# Patient Record
Sex: FEMALE | Race: WHITE | NOT HISPANIC OR LATINO | Employment: OTHER | RURAL
[De-identification: names, ages, dates, MRNs, and addresses within clinical notes are randomized per-mention and may not be internally consistent; named-entity substitution may affect disease eponyms.]

---

## 2020-07-20 ENCOUNTER — HISTORICAL (OUTPATIENT)
Dept: ADMINISTRATIVE | Facility: HOSPITAL | Age: 75
End: 2020-07-20

## 2021-08-17 ENCOUNTER — HOSPITAL ENCOUNTER (OUTPATIENT)
Dept: RADIOLOGY | Facility: HOSPITAL | Age: 76
Discharge: HOME | End: 2021-08-17
Payer: MEDICARE

## 2021-08-17 ENCOUNTER — HOSPITAL ENCOUNTER (OUTPATIENT)
Dept: RADIOLOGY | Facility: HOSPITAL | Age: 76
Discharge: HOME | End: 2021-08-17
Attending: RADIOLOGY
Payer: MEDICARE

## 2021-08-17 VITALS — WEIGHT: 164 LBS | BODY MASS INDEX: 25.74 KG/M2 | HEIGHT: 67 IN

## 2021-08-17 DIAGNOSIS — Z12.31 VISIT FOR SCREENING MAMMOGRAM: ICD-10-CM

## 2021-08-17 DIAGNOSIS — R92.8 ABNORMAL MAMMOGRAM: ICD-10-CM

## 2021-08-17 PROCEDURE — 77067 SCR MAMMO BI INCL CAD: CPT | Mod: 26,,, | Performed by: RADIOLOGY

## 2021-08-17 PROCEDURE — 77067 MAMMO DIGITAL SCREENING BILAT: ICD-10-PCS | Mod: 26,,, | Performed by: RADIOLOGY

## 2021-08-17 PROCEDURE — 77067 SCR MAMMO BI INCL CAD: CPT | Mod: TC

## 2022-09-27 ENCOUNTER — HOSPITAL ENCOUNTER (OUTPATIENT)
Dept: RADIOLOGY | Facility: HOSPITAL | Age: 77
Discharge: HOME | End: 2022-09-27
Payer: MEDICARE

## 2022-09-27 DIAGNOSIS — Z12.31 VISIT FOR SCREENING MAMMOGRAM: ICD-10-CM

## 2022-09-27 PROCEDURE — 77067 SCR MAMMO BI INCL CAD: CPT | Mod: 26,,, | Performed by: RADIOLOGY

## 2022-09-27 PROCEDURE — 77067 MAMMO DIGITAL SCREENING BILAT: ICD-10-PCS | Mod: 26,,, | Performed by: RADIOLOGY

## 2022-09-27 PROCEDURE — 77067 SCR MAMMO BI INCL CAD: CPT | Mod: TC

## 2022-10-18 ENCOUNTER — CLINICAL SUPPORT (OUTPATIENT)
Dept: FAMILY MEDICINE | Facility: CLINIC | Age: 77
End: 2022-10-18
Payer: MEDICARE

## 2022-10-18 DIAGNOSIS — Z23 NEED FOR IMMUNIZATION AGAINST INFLUENZA: Primary | ICD-10-CM

## 2022-10-18 PROCEDURE — G0008 ADMIN INFLUENZA VIRUS VAC: HCPCS | Mod: ,,, | Performed by: FAMILY MEDICINE

## 2022-10-18 PROCEDURE — 90694 VACC AIIV4 NO PRSRV 0.5ML IM: CPT | Mod: ,,, | Performed by: FAMILY MEDICINE

## 2022-10-18 PROCEDURE — G0008 FLU VACCINE - QUADRIVALENT - ADJUVANTED: ICD-10-PCS | Mod: ,,, | Performed by: FAMILY MEDICINE

## 2022-10-18 PROCEDURE — 90694 FLU VACCINE - QUADRIVALENT - ADJUVANTED: ICD-10-PCS | Mod: ,,, | Performed by: FAMILY MEDICINE

## 2022-11-09 DIAGNOSIS — Z71.89 COMPLEX CARE COORDINATION: ICD-10-CM

## 2022-11-16 ENCOUNTER — IMMUNIZATION (OUTPATIENT)
Dept: FAMILY MEDICINE | Facility: CLINIC | Age: 77
End: 2022-11-16
Payer: MEDICARE

## 2022-11-16 DIAGNOSIS — Z23 NEED FOR VACCINATION: Primary | ICD-10-CM

## 2022-11-16 PROCEDURE — 0134A COVID-19, MRNA, LNP-S, BIVALENT BOOSTER, PF, 50 MCG/0.5 ML: CPT | Mod: ,,, | Performed by: NURSE PRACTITIONER

## 2022-11-16 PROCEDURE — 91313 COVID-19, MRNA, LNP-S, BIVALENT BOOSTER, PF, 50 MCG/0.5 ML: ICD-10-PCS | Mod: ,,, | Performed by: NURSE PRACTITIONER

## 2022-11-16 PROCEDURE — 0134A COVID-19, MRNA, LNP-S, BIVALENT BOOSTER, PF, 50 MCG/0.5 ML: ICD-10-PCS | Mod: ,,, | Performed by: NURSE PRACTITIONER

## 2022-11-16 PROCEDURE — 91313 COVID-19, MRNA, LNP-S, BIVALENT BOOSTER, PF, 50 MCG/0.5 ML: CPT | Mod: ,,, | Performed by: NURSE PRACTITIONER

## 2022-11-29 ENCOUNTER — HOSPITAL ENCOUNTER (OUTPATIENT)
Dept: RADIOLOGY | Facility: HOSPITAL | Age: 77
Discharge: HOME | End: 2022-11-29
Attending: ORTHOPAEDIC SURGERY
Payer: MEDICARE

## 2022-11-29 DIAGNOSIS — M25.569 KNEE PAIN, UNSPECIFIED CHRONICITY, UNSPECIFIED LATERALITY: ICD-10-CM

## 2022-11-29 PROCEDURE — 73564 X-RAY EXAM KNEE 4 OR MORE: CPT | Mod: TC,RT

## 2022-12-06 PROBLEM — M17.11 PRIMARY OSTEOARTHRITIS OF RIGHT KNEE: Status: ACTIVE | Noted: 2022-12-06

## 2023-11-28 ENCOUNTER — HOSPITAL ENCOUNTER (OUTPATIENT)
Dept: RADIOLOGY | Facility: HOSPITAL | Age: 78
Discharge: HOME | End: 2023-11-28
Payer: MEDICARE

## 2023-11-28 DIAGNOSIS — Z12.31 VISIT FOR SCREENING MAMMOGRAM: ICD-10-CM

## 2023-11-28 PROCEDURE — 77067 SCR MAMMO BI INCL CAD: CPT | Mod: TC

## 2023-11-28 PROCEDURE — 77067 MAMMO DIGITAL SCREENING BILAT: ICD-10-PCS | Mod: 26,,, | Performed by: RADIOLOGY

## 2023-11-28 PROCEDURE — 77067 SCR MAMMO BI INCL CAD: CPT | Mod: 26,,, | Performed by: RADIOLOGY

## 2023-11-30 ENCOUNTER — OFFICE VISIT (OUTPATIENT)
Dept: FAMILY MEDICINE | Facility: CLINIC | Age: 78
End: 2023-11-30
Payer: MEDICARE

## 2023-11-30 VITALS
OXYGEN SATURATION: 97 % | TEMPERATURE: 99 F | BODY MASS INDEX: 26.84 KG/M2 | WEIGHT: 171 LBS | RESPIRATION RATE: 18 BRPM | SYSTOLIC BLOOD PRESSURE: 136 MMHG | HEIGHT: 67 IN | DIASTOLIC BLOOD PRESSURE: 76 MMHG | HEART RATE: 110 BPM

## 2023-11-30 DIAGNOSIS — J06.9 UPPER RESPIRATORY TRACT INFECTION, UNSPECIFIED TYPE: Primary | ICD-10-CM

## 2023-11-30 DIAGNOSIS — Z20.828 EXPOSURE TO VIRAL DISEASE: ICD-10-CM

## 2023-11-30 LAB
CTP QC/QA: YES
FLUAV AG NPH QL: NEGATIVE
FLUBV AG NPH QL: NEGATIVE

## 2023-11-30 PROCEDURE — 99213 OFFICE O/P EST LOW 20 MIN: CPT | Mod: ,,, | Performed by: NURSE PRACTITIONER

## 2023-11-30 PROCEDURE — 99213 PR OFFICE/OUTPT VISIT, EST, LEVL III, 20-29 MIN: ICD-10-PCS | Mod: ,,, | Performed by: NURSE PRACTITIONER

## 2023-11-30 PROCEDURE — 96372 PR INJECTION,THERAP/PROPH/DIAG2ST, IM OR SUBCUT: ICD-10-PCS | Mod: ,,, | Performed by: NURSE PRACTITIONER

## 2023-11-30 PROCEDURE — 96372 THER/PROPH/DIAG INJ SC/IM: CPT | Mod: ,,, | Performed by: NURSE PRACTITIONER

## 2023-11-30 PROCEDURE — 87804 INFLUENZA ASSAY W/OPTIC: CPT | Mod: 59,RHCUB | Performed by: NURSE PRACTITIONER

## 2023-11-30 RX ORDER — DEXAMETHASONE SODIUM PHOSPHATE 4 MG/ML
4 INJECTION, SOLUTION INTRA-ARTICULAR; INTRALESIONAL; INTRAMUSCULAR; INTRAVENOUS; SOFT TISSUE
Status: COMPLETED | OUTPATIENT
Start: 2023-11-30 | End: 2023-11-30

## 2023-11-30 RX ORDER — PRAVASTATIN SODIUM 40 MG/1
40 TABLET ORAL NIGHTLY
COMMUNITY
Start: 2023-11-06

## 2023-11-30 RX ORDER — CETIRIZINE HYDROCHLORIDE, PSEUDOEPHEDRINE HYDROCHLORIDE 5; 120 MG/1; MG/1
1 TABLET, FILM COATED, EXTENDED RELEASE ORAL 2 TIMES DAILY
Qty: 20 TABLET | Refills: 0 | Status: SHIPPED | OUTPATIENT
Start: 2023-11-30 | End: 2023-12-10

## 2023-11-30 RX ORDER — CARVEDILOL 25 MG/1
25 TABLET ORAL 2 TIMES DAILY
COMMUNITY
Start: 2023-11-10

## 2023-11-30 RX ORDER — IPRATROPIUM BROMIDE 21 UG/1
2 SPRAY, METERED NASAL 2 TIMES DAILY
Qty: 30 ML | Refills: 0 | Status: SHIPPED | OUTPATIENT
Start: 2023-11-30

## 2023-11-30 RX ADMIN — DEXAMETHASONE SODIUM PHOSPHATE 4 MG: 4 INJECTION, SOLUTION INTRA-ARTICULAR; INTRALESIONAL; INTRAMUSCULAR; INTRAVENOUS; SOFT TISSUE at 03:11

## 2023-11-30 NOTE — PROGRESS NOTES
[facility]    Chief Complaint      Chief Complaint   Patient presents with    Sore Throat     All symptoms onset of last Friday     Nasal Congestion    Fever     Low grade       History of Present Illness      Flaquita Sosa is a 78 y.o. female. She  has a past medical history of Hyperlipidemia and Hypertension., who presents today for URI type symptoms- ST, congestion, low-grade temp x 6 days.    Past Medical History:  Past Medical History:   Diagnosis Date    Hyperlipidemia     Hypertension        Past Surgical History:   has a past surgical history that includes Oophorectomy and Hysterectomy.    Social History:  Social History     Tobacco Use    Smoking status: Never     Passive exposure: Never    Smokeless tobacco: Never   Substance Use Topics    Alcohol use: Never    Drug use: Never       I personally reviewed all past medical, surgical, and social.     Review of Systems   Constitutional:  Positive for fever. Negative for chills and fatigue.   HENT:  Positive for congestion and sore throat.    Respiratory:  Negative for cough and shortness of breath.    Gastrointestinal:  Negative for diarrhea, nausea and vomiting.   Musculoskeletal:  Negative for myalgias.   Neurological:  Negative for headaches.        Medications:  Outpatient Encounter Medications as of 11/30/2023   Medication Sig Dispense Refill    carvediloL (COREG) 25 MG tablet Take 25 mg by mouth 2 (two) times daily.      ELIQUIS 2.5 mg Tab Take 2.5 mg by mouth 2 (two) times daily.      flecainide (TAMBOCOR) 50 MG Tab       furosemide (LASIX) 20 MG tablet       olmesartan-hydrochlorothiazide (BENICAR HCT) 40-12.5 mg Tab       pravastatin (PRAVACHOL) 40 MG tablet Take 40 mg by mouth every evening.      spironolactone (ALDACTONE) 25 MG tablet Take 25 mg by mouth.      cetirizine-pseudoephedrine 5-120 mg Tb12 Take 1 tablet by mouth 2 (two) times a day. for 10 days 20 tablet 0    ipratropium (ATROVENT) 21 mcg (0.03 %) nasal spray 2 sprays by Each  "Nostril route 2 (two) times daily. 30 mL 0    pravastatin (PRAVACHOL) 20 MG tablet       [] dexAMETHasone injection 4 mg        No facility-administered encounter medications on file as of 2023.       Allergies:  Review of patient's allergies indicates:  No Known Allergies    Health Maintenance:  Immunization History   Administered Date(s) Administered    COVID-19 MRNA, LN-S PF (MODERNA HALF 0.25 ML DOSE) 09/10/2021, 2022    COVID-19, MRNA, LN-S, PF (MODERNA FULL 0.5 ML DOSE) 2021, 03/15/2021    COVID-19, mRNA, LNP-S, bivalent booster, PF (Moderna Omicron)12 + YEARS 2022    DTaP (5 Pertussis Antigens) 2017    Influenza (FLUAD) - Quadrivalent - Adjuvanted - PF *Preferred* (65+) 10/18/2022    Influenza - High Dose - PF (65 years and older) 10/27/2015, 10/28/2016, 10/27/2017, 10/26/2018, 2020, 2021    Influenza - Quadrivalent - PF *Preferred* (6 months and older) 10/17/2019    Influenza - Trivalent (ADULT) 10/14/2014    Pneumococcal Conjugate - 13 Valent 2019    Pneumococcal Polysaccharide - 23 Valent 2015    Zoster 10/12/2020, 2020      Health Maintenance   Topic Date Due    Hepatitis C Screening  Never done    Lipid Panel  Never done    TETANUS VACCINE  Never done    DEXA Scan  Never done    Shingles Vaccine (2 of 3) 2021        Physical Exam      Vital Signs  Temp: 98.5 °F (36.9 °C)  Temp Source: Oral  Pulse: 110  Resp: 18  SpO2: 97 %  BP: 136/76  BP Location: Right arm  Patient Position: Sitting  Pain Score:   3  Height and Weight  Height: 5' 7" (170.2 cm)  Weight: 77.6 kg (171 lb)  BSA (Calculated - sq m): 1.91 sq meters  BMI (Calculated): 26.8  Weight in (lb) to have BMI = 25: 159.3]    Physical Exam  Vitals and nursing note reviewed.   Constitutional:       Appearance: Normal appearance. She is well-developed.   HENT:      Head: Normocephalic.      Right Ear: Hearing, tympanic membrane, ear canal and external ear normal.      Left Ear: " "Hearing, tympanic membrane, ear canal and external ear normal.      Nose: Congestion present.      Mouth/Throat:      Lips: Pink.      Pharynx: Oropharynx is clear.   Eyes:      General: Lids are normal.      Conjunctiva/sclera: Conjunctivae normal.   Cardiovascular:      Rate and Rhythm: Normal rate and regular rhythm.      Heart sounds: Normal heart sounds.   Pulmonary:      Effort: Pulmonary effort is normal.      Breath sounds: Normal breath sounds.   Musculoskeletal:         General: Normal range of motion.      Cervical back: Normal range of motion and neck supple.   Skin:     General: Skin is warm and dry.   Neurological:      Mental Status: She is alert and oriented to person, place, and time.      Gait: Gait is intact.   Psychiatric:         Behavior: Behavior is cooperative.          Laboratory:  CBC:      CMP:        Invalid input(s): "CREATININ"  LIPIDS:      TSH:      A1C:        Assessment/Plan     Flaquita Sosa is a 78 y.o.female with:     1. Upper respiratory tract infection, unspecified type  -     dexAMETHasone injection 4 mg  -     ipratropium (ATROVENT) 21 mcg (0.03 %) nasal spray; 2 sprays by Each Nostril route 2 (two) times daily.  Dispense: 30 mL; Refill: 0  -     cetirizine-pseudoephedrine 5-120 mg Tb12; Take 1 tablet by mouth 2 (two) times a day. for 10 days  Dispense: 20 tablet; Refill: 0    2. Exposure to viral disease  -     POCT Influenza A/B     Rapid flu- negative    Total time spent face-to-face and non-face-to-face coordinating care for this encounter was: 20 minutes     Chronic conditions status updated as per HPI.  Other than changes above, cont current medications and maintain follow up with specialists.  Return to clinic prn if symptoms worsen or fail to improve.    Laura Carnes, MARYP  [facility]  "

## 2023-12-23 ENCOUNTER — OFFICE VISIT (OUTPATIENT)
Dept: FAMILY MEDICINE | Facility: CLINIC | Age: 78
End: 2023-12-23
Payer: MEDICARE

## 2023-12-23 VITALS
HEART RATE: 80 BPM | TEMPERATURE: 98 F | SYSTOLIC BLOOD PRESSURE: 115 MMHG | HEIGHT: 67 IN | BODY MASS INDEX: 26.68 KG/M2 | WEIGHT: 170 LBS | DIASTOLIC BLOOD PRESSURE: 60 MMHG | RESPIRATION RATE: 19 BRPM | OXYGEN SATURATION: 95 %

## 2023-12-23 DIAGNOSIS — U07.1 COVID-19: Primary | ICD-10-CM

## 2023-12-23 DIAGNOSIS — Z20.828 EXPOSURE TO VIRAL DISEASE: ICD-10-CM

## 2023-12-23 LAB
CTP QC/QA: YES
CTP QC/QA: YES
FLUAV AG NPH QL: NEGATIVE
FLUBV AG NPH QL: NEGATIVE
SARS-COV-2 AG RESP QL IA.RAPID: POSITIVE

## 2023-12-23 PROCEDURE — 99214 PR OFFICE/OUTPT VISIT, EST, LEVL IV, 30-39 MIN: ICD-10-PCS | Mod: ,,, | Performed by: FAMILY MEDICINE

## 2023-12-23 PROCEDURE — 99214 OFFICE O/P EST MOD 30 MIN: CPT | Mod: ,,, | Performed by: FAMILY MEDICINE

## 2023-12-23 PROCEDURE — 87804 INFLUENZA ASSAY W/OPTIC: CPT | Mod: 59,RHCUB | Performed by: FAMILY MEDICINE

## 2023-12-23 PROCEDURE — 96372 THER/PROPH/DIAG INJ SC/IM: CPT | Mod: ,,, | Performed by: FAMILY MEDICINE

## 2023-12-23 PROCEDURE — 96372 PR INJECTION,THERAP/PROPH/DIAG2ST, IM OR SUBCUT: ICD-10-PCS | Mod: ,,, | Performed by: FAMILY MEDICINE

## 2023-12-23 PROCEDURE — 87426 SARSCOV CORONAVIRUS AG IA: CPT | Mod: RHCUB | Performed by: FAMILY MEDICINE

## 2023-12-23 RX ORDER — DEXAMETHASONE SODIUM PHOSPHATE 4 MG/ML
4 INJECTION, SOLUTION INTRA-ARTICULAR; INTRALESIONAL; INTRAMUSCULAR; INTRAVENOUS; SOFT TISSUE
Status: COMPLETED | OUTPATIENT
Start: 2023-12-23 | End: 2023-12-23

## 2023-12-23 RX ORDER — PREDNISONE 20 MG/1
20 TABLET ORAL DAILY
Qty: 5 TABLET | Refills: 0 | Status: SHIPPED | OUTPATIENT
Start: 2023-12-23 | End: 2023-12-28

## 2023-12-23 RX ORDER — AZITHROMYCIN 250 MG/1
TABLET, FILM COATED ORAL
Qty: 6 TABLET | Refills: 0 | Status: SHIPPED | OUTPATIENT
Start: 2023-12-23

## 2023-12-23 RX ADMIN — DEXAMETHASONE SODIUM PHOSPHATE 4 MG: 4 INJECTION, SOLUTION INTRA-ARTICULAR; INTRALESIONAL; INTRAMUSCULAR; INTRAVENOUS; SOFT TISSUE at 12:12

## 2023-12-23 NOTE — LETTER
December 23, 2023      [facility]  [address] [facility] [place] [address]  Phone: [phone]  Fax: [phone]       Patient: Flaquita Sosa   YOB: 1945  Date of Visit: 12/23/2023    To Whom It May Concern:    Bogdan Sosa  was at [facility] on 12/23/2023. The patient may return to work/school on 12/28/2023 with no restrictions. If you have any questions or concerns, or if I can be of further assistance, please do not hesitate to contact me.    Sincerely,    Ramon Zimmerman, CMA

## 2023-12-23 NOTE — PROGRESS NOTES
Subjective:       Patient ID: Flaquita Sosa is a 78 y.o. female.    Chief Complaint: Nasal Congestion    HPI  Review of Systems   Constitutional:  Negative for activity change, appetite change, chills, diaphoresis, fatigue, fever and unexpected weight change.   HENT:  Positive for nasal congestion, postnasal drip, sinus pressure/congestion and sore throat. Negative for dental problem, drooling, ear discharge, ear pain, facial swelling, hearing loss, mouth sores, nosebleeds, rhinorrhea, sneezing, tinnitus, trouble swallowing, voice change and goiter.    Eyes:  Negative for photophobia, discharge, itching and visual disturbance.   Respiratory:  Negative for apnea, cough, choking, chest tightness, shortness of breath, wheezing and stridor.    Cardiovascular:  Negative for chest pain, palpitations, leg swelling and claudication.   Gastrointestinal:  Negative for abdominal distention, abdominal pain, anal bleeding, blood in stool, change in bowel habit, constipation, diarrhea, nausea and vomiting.   Endocrine: Negative for cold intolerance, heat intolerance, polydipsia, polyphagia and polyuria.   Genitourinary:  Negative for bladder incontinence, decreased urine volume, difficulty urinating, dysuria, enuresis, flank pain, frequency, hematuria, nocturia, pelvic pain and urgency.   Musculoskeletal:  Negative for arthralgias, back pain, gait problem, joint swelling, leg pain, myalgias, neck pain, neck stiffness and joint deformity.   Integumentary:  Negative for pallor, rash, wound, breast mass and breast tenderness.   Allergic/Immunologic: Negative for environmental allergies, food allergies and immunocompromised state.   Neurological:  Negative for dizziness, vertigo, tremors, seizures, syncope, facial asymmetry, speech difficulty, weakness, light-headedness, numbness, headaches, memory loss and coordination difficulties.   Hematological:  Negative for adenopathy. Does not bruise/bleed easily.   Psychiatric/Behavioral:   Negative for agitation, behavioral problems, confusion, decreased concentration, dysphoric mood, hallucinations, self-injury, sleep disturbance and suicidal ideas. The patient is not nervous/anxious and is not hyperactive.    Breast: Negative for mass and tenderness        Objective:      Physical Exam  Vitals reviewed.   Constitutional:       Appearance: Normal appearance.   HENT:      Head: Normocephalic and atraumatic.      Right Ear: Tympanic membrane, ear canal and external ear normal.      Left Ear: Tympanic membrane, ear canal and external ear normal.      Nose: Congestion and rhinorrhea present.      Mouth/Throat:      Mouth: Mucous membranes are moist.      Pharynx: Oropharynx is clear. Posterior oropharyngeal erythema present.   Eyes:      Extraocular Movements: Extraocular movements intact.      Conjunctiva/sclera: Conjunctivae normal.      Pupils: Pupils are equal, round, and reactive to light.   Cardiovascular:      Rate and Rhythm: Normal rate and regular rhythm.      Pulses: Normal pulses.      Heart sounds: Normal heart sounds.   Pulmonary:      Effort: Pulmonary effort is normal.      Breath sounds: Normal breath sounds.   Abdominal:      General: Bowel sounds are normal.      Palpations: Abdomen is soft.   Musculoskeletal:         General: Normal range of motion.      Cervical back: Normal range of motion and neck supple.   Skin:     General: Skin is warm and dry.   Neurological:      General: No focal deficit present.      Mental Status: She is alert. Mental status is at baseline.   Psychiatric:         Mood and Affect: Mood normal.         Behavior: Behavior normal.         Thought Content: Thought content normal.         Judgment: Judgment normal.         Assessment:       1. COVID-19    2. Exposure to viral disease        Plan:     COVID-19  -     dexAMETHasone injection 4 mg  -     predniSONE (DELTASONE) 20 MG tablet; Take 1 tablet (20 mg total) by mouth once daily. for 5 days  Dispense: 5  tablet; Refill: 0  -     azithromycin (Z-AMADOR) 250 MG tablet; Take 2 tablets by mouth on day 1; Take 1 tablet by mouth on days 2-5  Dispense: 6 tablet; Refill: 0    Exposure to viral disease  -     SARS Coronavirus 2 Antigen, POCT  -     POCT Influenza A/B Rapid Antigen

## 2024-06-12 ENCOUNTER — HOSPITAL ENCOUNTER (OUTPATIENT)
Dept: RADIOLOGY | Facility: HOSPITAL | Age: 79
Discharge: HOME | End: 2024-06-12
Attending: NURSE PRACTITIONER
Payer: MEDICARE

## 2024-06-12 ENCOUNTER — HOSPITAL ENCOUNTER (OUTPATIENT)
Dept: RADIOLOGY | Facility: HOSPITAL | Age: 79
Discharge: HOME | End: 2024-06-12
Attending: ORTHOPAEDIC SURGERY
Payer: MEDICARE

## 2024-06-12 ENCOUNTER — OFFICE VISIT (OUTPATIENT)
Dept: ORTHOPEDICS | Facility: CLINIC | Age: 79
End: 2024-06-12
Payer: MEDICARE

## 2024-06-12 VITALS — BODY MASS INDEX: 26.07 KG/M2 | WEIGHT: 172 LBS | HEIGHT: 68 IN

## 2024-06-12 DIAGNOSIS — M79.671 RIGHT FOOT PAIN: ICD-10-CM

## 2024-06-12 DIAGNOSIS — M79.671 RIGHT FOOT PAIN: Primary | ICD-10-CM

## 2024-06-12 PROCEDURE — 99999 PR PBB SHADOW E&M-EST. PATIENT-LVL IV: CPT | Mod: PBBFAC,,, | Performed by: NURSE PRACTITIONER

## 2024-06-12 PROCEDURE — 73610 X-RAY EXAM OF ANKLE: CPT | Mod: TC,RT

## 2024-06-12 PROCEDURE — 73610 X-RAY EXAM OF ANKLE: CPT | Mod: 26,RT,, | Performed by: RADIOLOGY

## 2024-06-12 PROCEDURE — 99214 OFFICE O/P EST MOD 30 MIN: CPT | Mod: S$PBB,,, | Performed by: NURSE PRACTITIONER

## 2024-06-12 PROCEDURE — 73630 X-RAY EXAM OF FOOT: CPT | Mod: 26,RT,, | Performed by: RADIOLOGY

## 2024-06-12 PROCEDURE — 99214 OFFICE O/P EST MOD 30 MIN: CPT | Mod: PBBFAC,25 | Performed by: NURSE PRACTITIONER

## 2024-06-12 PROCEDURE — 73630 X-RAY EXAM OF FOOT: CPT | Mod: TC,RT

## 2024-06-12 RX ORDER — AMLODIPINE BESYLATE 2.5 MG/1
2.5 TABLET ORAL DAILY
COMMUNITY

## 2024-06-12 NOTE — PROGRESS NOTES
HPI:   Flaquita Sosa is a pleasant 78 y.o. patient who reports to clinic for evaluation of right foot and ankle..  Reports she rolled her foot about 2 weeks ago and it has been painful since that time.  She rolled to the outside.  She does have some mild swelling on the medial and lateral side of her ankle but her pain is in her mid foot area.  She reports it has come down some but is still painful at times when she walks.  She has had no previous treatment.  Can not take anti-inflammatories.    Injury onset and description: fall  Patient's occupation:   This is not a work related injury.   This injury has been non-responsive to conservative care. The pain is worse with repetitive use, and strenuous activity is very difficult.  her pain improves with rest.  she rates pain as a  on the Visual Analog Scale.        PAST MEDICAL HISTORY:   Past Medical History:   Diagnosis Date    Hyperlipidemia     Hypertension      PAST SURGICAL HISTORY:   Past Surgical History:   Procedure Laterality Date    HYSTERECTOMY      OOPHORECTOMY       MEDICATIONS:    Current Outpatient Medications:     amLODIPine (NORVASC) 2.5 MG tablet, Take 2.5 mg by mouth once daily., Disp: , Rfl:     carvediloL (COREG) 25 MG tablet, Take 25 mg by mouth 2 (two) times daily., Disp: , Rfl:     ELIQUIS 2.5 mg Tab, Take 2.5 mg by mouth 2 (two) times daily., Disp: , Rfl:     flecainide (TAMBOCOR) 50 MG Tab, , Disp: , Rfl:     furosemide (LASIX) 20 MG tablet, , Disp: , Rfl:     pravastatin (PRAVACHOL) 20 MG tablet, , Disp: , Rfl:     pravastatin (PRAVACHOL) 40 MG tablet, Take 40 mg by mouth every evening., Disp: , Rfl:     spironolactone (ALDACTONE) 25 MG tablet, Take 25 mg by mouth., Disp: , Rfl:     azithromycin (Z-AMADOR) 250 MG tablet, Take 2 tablets by mouth on day 1; Take 1 tablet by mouth on days 2-5 (Patient not taking: Reported on 6/12/2024), Disp: 6 tablet, Rfl: 0    ipratropium (ATROVENT) 21 mcg (0.03 %) nasal spray, 2 sprays by Each  "Nostril route 2 (two) times daily., Disp: 30 mL, Rfl: 0    olmesartan-hydrochlorothiazide (BENICAR HCT) 40-12.5 mg Tab, , Disp: , Rfl:   ALLERGIES:   Review of patient's allergies indicates:   Allergen Reactions    Iodinated contrast media          PHYSICAL EXAM:  VITAL SIGNS: Ht 5' 8" (1.727 m)   Wt 78 kg (172 lb)   BMI 26.15 kg/m²   General: Well-developed well-nourished 78 y.o. femalein no acute distress;Cardiovascular: Regular rhythm by palpation of distal pulse, normal color and temperature, no concerning varicosities on symptomatic side Lungs: No labored breathing or wheezing appreciated Neuro: Alert and oriented ×3 Psychiatric: well oriented to person, place and time, demonstrates normal mood and affect Skin: No rashes, lesions or ulcers, normal temperature, turgor, and texture on uninvolved extremity    General    Constitutional: She is oriented to person, place, and time. She appears well-nourished.   HENT:   Head: Normocephalic and atraumatic.   Eyes: Pupils are equal, round, and reactive to light.   Neck: Neck supple.   Cardiovascular:  Normal rate and regular rhythm.            Pulmonary/Chest: Effort normal. No respiratory distress.   Abdominal: There is no abdominal tenderness. There is no guarding.   Neurological: She is alert and oriented to person, place, and time. She has normal reflexes.   Psychiatric: She has a normal mood and affect. Her behavior is normal. Judgment and thought content normal.         Right Ankle/Foot Exam     Inspection   Erythema: absent  Bruising: Ankle - absent Foot - absent  Effusion: Ankle - present Foot - present    Tenderness   The patient is tender to palpation of the metatarsals.    Range of Motion   Ankle Joint   Dorsiflexion:  normal   Plantar flexion:  normal   Subtalar Joint   Inversion:  normal   Eversion:  normal         Vascular Exam     Right Pulses  Dorsalis Pedis:      2+          IMAGING:  No results found.  EXAMINATION:  XR ANKLE COMPLETE 3 VIEW RIGHT   "   CLINICAL HISTORY:  Pain in right foot     COMPARISON:  None available     TECHNIQUE:  XR ANKLE 3 VIEW RIGHT     FINDINGS:  No evidence of fracture seen.  The alignment of the joints appears normal.  Mild ankle degenerative change is present.  No soft tissue abnormality is seen.     Impression:     Mild ankle osteoarthrosis.        Electronically signed by:Jaison Sanchez  Date:                                            06/12/2024  Time:                                           14:41           Exam Ended: 06/12/24 14:37 CDT Last Resulted: 06/12/24 14:41 CDT             ASSESSMENT:      ICD-10-CM ICD-9-CM   1. Right foot pain  M79.671 729.5       PLAN:     -Findings and treatment options were discussed with the patient  -All questions answered  We will put her in a walking boot for 2 weeks and have her return to clinic with Dr. Bobo if no better.  She is unable to take anti-inflammatories due to some of her medications and she is on Eliquis twice a day.  Ice and elevate.    There are no Patient Instructions on file for this visit.  Orders Placed This Encounter   Procedures    X-Ray Ankle Complete Right     Standing Status:   Future     Number of Occurrences:   1     Standing Expiration Date:   6/12/2025     Order Specific Question:   May the Radiologist modify the order per protocol to meet the clinical needs of the patient?     Answer:   Yes     Order Specific Question:   Release to patient     Answer:   Immediate    X-Ray Foot Complete Right     Standing Status:   Future     Number of Occurrences:   1     Standing Expiration Date:   6/12/2025     Order Specific Question:   May the Radiologist modify the order per protocol to meet the clinical needs of the patient?     Answer:   Yes     Order Specific Question:   Release to patient     Answer:   Immediate     Procedures

## 2024-07-02 ENCOUNTER — HOSPITAL ENCOUNTER (OUTPATIENT)
Dept: RADIOLOGY | Facility: HOSPITAL | Age: 79
Discharge: HOME | End: 2024-07-02
Attending: ORTHOPAEDIC SURGERY
Payer: MEDICARE

## 2024-07-02 ENCOUNTER — OFFICE VISIT (OUTPATIENT)
Dept: ORTHOPEDICS | Facility: CLINIC | Age: 79
End: 2024-07-02
Payer: MEDICARE

## 2024-07-02 DIAGNOSIS — M25.569 KNEE PAIN, UNSPECIFIED CHRONICITY, UNSPECIFIED LATERALITY: Primary | ICD-10-CM

## 2024-07-02 DIAGNOSIS — Z09 FOLLOW-UP EXAMINATION, FOLLOWING OTHER SURGERY: Primary | ICD-10-CM

## 2024-07-02 DIAGNOSIS — M25.569 KNEE PAIN, UNSPECIFIED CHRONICITY, UNSPECIFIED LATERALITY: ICD-10-CM

## 2024-07-02 PROCEDURE — 99999 PR PBB SHADOW E&M-EST. PATIENT-LVL II: CPT | Mod: PBBFAC,,, | Performed by: ORTHOPAEDIC SURGERY

## 2024-07-02 PROCEDURE — 73630 X-RAY EXAM OF FOOT: CPT | Mod: TC,RT

## 2024-07-02 PROCEDURE — 99212 OFFICE O/P EST SF 10 MIN: CPT | Mod: S$PBB,,, | Performed by: ORTHOPAEDIC SURGERY

## 2024-07-02 PROCEDURE — 99212 OFFICE O/P EST SF 10 MIN: CPT | Mod: PBBFAC,25 | Performed by: ORTHOPAEDIC SURGERY

## 2024-07-02 NOTE — PROGRESS NOTES
CC:   Chief Complaint   Patient presents with    Right Foot - Pain     LYLA BRENNAN        PREVIOUS INFO:  Lyla Payan  June 12, 2024   HPI:   Flaquita Sosa is a pleasant 78 y.o. patient who reports to clinic for evaluation of right foot and ankle..  Reports she rolled her foot about 2 weeks ago and it has been painful since that time.  She rolled to the outside.  She does have some mild swelling on the medial and lateral side of her ankle but her pain is in her mid foot area.  She reports it has come down some but is still painful at times when she walks.  She has had no previous treatment.  Can not take anti-inflammatories.    Injury onset and description: fall  Patient's occupation:   This is not a work related injury.   This injury has been non-responsive to conservative care. The pain is worse with repetitive use, and strenuous activity is very difficult.  her pain improves with rest.  she rates pain as a  on the Visual Analog Scale.             HISTORY:   7/2/2024    Flaquita Sosa  is a 78 y.o. right foot injury rolled her foot on uneven concrete can not take anti-inflammatories 2nd to be on Eliquis and she has been in a walking boot patient states her feet her foot and ankle done great not really hurting today      PAST MEDICAL HISTORY:   Past Medical History:   Diagnosis Date    Hyperlipidemia     Hypertension           PAST SURGICAL HISTORY:   Past Surgical History:   Procedure Laterality Date    HYSTERECTOMY      OOPHORECTOMY            ALLERGIES:   Review of patient's allergies indicates:   Allergen Reactions    Iodinated contrast media         MEDICATIONS :    Current Outpatient Medications:     amLODIPine (NORVASC) 2.5 MG tablet, Take 2.5 mg by mouth once daily., Disp: , Rfl:     azithromycin (Z-AMADOR) 250 MG tablet, Take 2 tablets by mouth on day 1; Take 1 tablet by mouth on days 2-5 (Patient not taking: Reported on 6/12/2024), Disp: 6 tablet, Rfl: 0    carvediloL (COREG) 25 MG  tablet, Take 25 mg by mouth 2 (two) times daily., Disp: , Rfl:     ELIQUIS 2.5 mg Tab, Take 2.5 mg by mouth 2 (two) times daily., Disp: , Rfl:     flecainide (TAMBOCOR) 50 MG Tab, , Disp: , Rfl:     furosemide (LASIX) 20 MG tablet, , Disp: , Rfl:     ipratropium (ATROVENT) 21 mcg (0.03 %) nasal spray, 2 sprays by Each Nostril route 2 (two) times daily., Disp: 30 mL, Rfl: 0    olmesartan-hydrochlorothiazide (BENICAR HCT) 40-12.5 mg Tab, , Disp: , Rfl:     pravastatin (PRAVACHOL) 20 MG tablet, , Disp: , Rfl:     pravastatin (PRAVACHOL) 40 MG tablet, Take 40 mg by mouth every evening., Disp: , Rfl:     spironolactone (ALDACTONE) 25 MG tablet, Take 25 mg by mouth., Disp: , Rfl:      SOCIAL HISTORY:   Social History     Socioeconomic History    Marital status:    Tobacco Use    Smoking status: Never     Passive exposure: Never    Smokeless tobacco: Never   Substance and Sexual Activity    Alcohol use: Never    Drug use: Never    Sexual activity: Not Currently     Partners: Male        ROS    FAMILY HISTORY:   Family History   Family history unknown: Yes          PHYSICAL EXAM: There were no vitals filed for this visit.            There is no height or weight on file to calculate BMI.     In general, this is a well-developed, well-nourished female . The patient is alert, oriented and cooperative.      HEENT:  Normocephalic, atraumatic.  Extraocular movements are intact bilaterally.  The oropharynx is benign.       NECK:  Nontender with good range of motion.      PULMONARY: Respirations are even and non-labored.       CARDIOVASCULAR: Pulses regular by peripheral palpation.       ABDOMEN:  Soft, non-tender, non-distended.        EXTREMITIES:  Ankle nontender today with no swelling nontender range motion    Ortho Exam      RADIOGRAPHIC FINDINGS:  Right foot AP lateral oblique views osteopenic bone normal metatarsal tarsal alignment no fracture dislocation appreciated      .      IMPRESSION:  Patient is doing  excellent    PLAN:  Good supportive shoes we are going to see her back p.r.n.        No follow-ups on file.         Karan Bobo III      (Subject to voice recognition error, transcription service not allowed)

## 2024-08-24 ENCOUNTER — OFFICE VISIT (OUTPATIENT)
Dept: FAMILY MEDICINE | Facility: CLINIC | Age: 79
End: 2024-08-24
Payer: MEDICARE

## 2024-08-24 VITALS
RESPIRATION RATE: 18 BRPM | HEART RATE: 85 BPM | OXYGEN SATURATION: 97 % | TEMPERATURE: 98 F | BODY MASS INDEX: 25.61 KG/M2 | HEIGHT: 68 IN | WEIGHT: 169 LBS | SYSTOLIC BLOOD PRESSURE: 146 MMHG | DIASTOLIC BLOOD PRESSURE: 75 MMHG

## 2024-08-24 DIAGNOSIS — Z11.52 ENCOUNTER FOR SCREENING FOR COVID-19: ICD-10-CM

## 2024-08-24 DIAGNOSIS — K52.9 ACUTE GASTROENTERITIS: Primary | ICD-10-CM

## 2024-08-24 LAB
CTP QC/QA: YES
SARS-COV-2 RDRP RESP QL NAA+PROBE: NEGATIVE

## 2024-08-24 PROCEDURE — 99213 OFFICE O/P EST LOW 20 MIN: CPT | Mod: ,,, | Performed by: NURSE PRACTITIONER

## 2024-08-24 PROCEDURE — 87635 SARS-COV-2 COVID-19 AMP PRB: CPT | Mod: RHCUB | Performed by: NURSE PRACTITIONER

## 2024-08-24 RX ORDER — EMPAGLIFLOZIN 10 MG/1
10 TABLET, FILM COATED ORAL DAILY
COMMUNITY
Start: 2024-08-19

## 2024-08-24 NOTE — PROGRESS NOTES
ALVARO Grijalva   [facility] [place] [address] [place] [address]  [phone]      PATIENT NAME: Flaquita Sosa  : 1945  DATE: 24  MRN: [id number]      Billing Provider: ALVARO Grijalva  Level of Service: [place] OFFICE/OUTPT VISIT, EST, LEVL III, 20-29 MIN  Patient PCP Information       Provider PCP Type    Concetta Grady DO General            Reason for Visit / Chief Complaint: Sinus Problem (Runny nose, diarrhea, headache )       Update PCP  Update Chief Complaint         History of Present Illness / Problem Focused Workflow       Patient presents to clinic with the above listed complaints. States her symptoms started today after lunch. She has only had one episode of diarrhea and states her nausea has resolved at present. She was exposed to covid at work. Patient informed to get retested if symptoms persist.       Review of Systems     Review of Systems   Constitutional:  Negative for chills, diaphoresis, fatigue and fever.   HENT:  Positive for rhinorrhea. Negative for congestion, ear pain, facial swelling and trouble swallowing.    Eyes:  Negative for pain, discharge, redness, itching and visual disturbance.   Respiratory:  Negative for apnea, cough, chest tightness, shortness of breath and wheezing.    Cardiovascular:  Negative for chest pain, palpitations and leg swelling.   Gastrointestinal:  Positive for diarrhea and nausea. Negative for abdominal pain, constipation and vomiting.   Genitourinary:  Negative for dysuria.   Skin:  Negative for rash.   Neurological:  Negative for dizziness and headaches.     Medical / Social / Family History     Past Medical History:   Diagnosis Date    Hyperlipidemia     Hypertension        Past Surgical History:   Procedure Laterality Date    HYSTERECTOMY      OOPHORECTOMY         Social History  Ms.  reports that she has never smoked. She has never been  exposed to tobacco smoke. She has never used smokeless tobacco. She reports that she does not drink alcohol and does not use drugs.    Family History  Ms.'s Family history is unknown by patient.    Medications and Allergies     Medications  Outpatient Medications Marked as Taking for the 8/24/24 encounter (Office Visit) with Kajal Sarkar FNP   Medication Sig Dispense Refill    carvediloL (COREG) 25 MG tablet Take 25 mg by mouth 2 (two) times daily.      ELIQUIS 2.5 mg Tab Take 2.5 mg by mouth 2 (two) times daily.      flecainide (TAMBOCOR) 50 MG Tab       furosemide (LASIX) 20 MG tablet       JARDIANCE 10 mg tablet Take 10 mg by mouth once daily.      olmesartan-hydrochlorothiazide (BENICAR HCT) 40-12.5 mg Tab       pravastatin (PRAVACHOL) 40 MG tablet Take 40 mg by mouth every evening.      spironolactone (ALDACTONE) 25 MG tablet Take 25 mg by mouth.         Allergies  Review of patient's allergies indicates:   Allergen Reactions    Iodinated contrast media        Physical Examination     Vitals:    08/24/24 1549   BP: (!) 146/75   Pulse: 85   Resp: 18   Temp: 97.6 °F (36.4 °C)     Physical Exam  Vitals and nursing note reviewed.   Constitutional:       General: She is awake.      Appearance: Normal appearance.   HENT:      Head: Normocephalic.      Right Ear: Tympanic membrane, ear canal and external ear normal.      Left Ear: Tympanic membrane, ear canal and external ear normal.      Nose: Nose normal.      Mouth/Throat:      Lips: Pink.      Mouth: Mucous membranes are moist.      Pharynx: Oropharynx is clear.   Eyes:      General: Lids are normal.      Extraocular Movements: Extraocular movements intact.      Conjunctiva/sclera: Conjunctivae normal.      Pupils: Pupils are equal, round, and reactive to light.   Cardiovascular:      Rate and Rhythm: Normal rate and regular rhythm.      Pulses: Normal pulses.      Heart sounds: Normal heart sounds. No murmur heard.  Pulmonary:      Effort: Pulmonary  effort is normal.      Breath sounds: Normal breath sounds. No decreased breath sounds, wheezing, rhonchi or rales.   Musculoskeletal:      Right lower leg: No edema.      Left lower leg: No edema.   Skin:     General: Skin is warm.      Coloration: Skin is not jaundiced.      Findings: No rash.   Neurological:      Mental Status: She is alert. Mental status is at baseline.   Psychiatric:         Mood and Affect: Mood normal.         Behavior: Behavior normal. Behavior is cooperative.     Assessment and Plan (including Health Maintenance)      Problem List  Smart Sets  Document Outside HM   :    Health Maintenance Due   Topic Date Due    Hepatitis C Screening  Never done    Lipid Panel  Never done    TETANUS VACCINE  Never done    DEXA Scan  Never done    RSV Vaccine (Age 60+ and Pregnant patients) (1 - 1-dose 60+ series) Never done    Shingles Vaccine (2 of 3) 02/05/2021    COVID-19 Vaccine (6 - 2023-24 season) 09/01/2023       Problem List Items Addressed This Visit    None  Visit Diagnoses       Acute gastroenteritis    -  Primary    Encounter for screening for COVID-19        Relevant Orders    POCT COVID-19 Rapid Screening (Completed)            Health Maintenance Topics with due status: Not Due       Topic Last Completion Date    Influenza Vaccine 10/18/2022       Future Appointments   Date Time Provider Department Center   1/14/2025 11:00 AM [facility]          Signature:  ALVARO Grijalva  [place] [facility]  [address] [place] [address] [place] [address]  [phone]    Date of encounter: 8/24/24

## 2024-11-23 ENCOUNTER — OFFICE VISIT (OUTPATIENT)
Dept: FAMILY MEDICINE | Facility: CLINIC | Age: 79
End: 2024-11-23
Payer: MEDICARE

## 2024-11-23 VITALS
TEMPERATURE: 99 F | WEIGHT: 171 LBS | HEART RATE: 68 BPM | DIASTOLIC BLOOD PRESSURE: 70 MMHG | OXYGEN SATURATION: 98 % | SYSTOLIC BLOOD PRESSURE: 128 MMHG | BODY MASS INDEX: 26 KG/M2

## 2024-11-23 DIAGNOSIS — Z20.828 EXPOSURE TO VIRAL DISEASE: ICD-10-CM

## 2024-11-23 DIAGNOSIS — J32.9 SINUSITIS, UNSPECIFIED CHRONICITY, UNSPECIFIED LOCATION: Primary | ICD-10-CM

## 2024-11-23 LAB
CTP QC/QA: YES
MOLECULAR STREP A: NEGATIVE
POC MOLECULAR INFLUENZA A AGN: NEGATIVE
POC MOLECULAR INFLUENZA B AGN: NEGATIVE
SARS-COV-2 RDRP RESP QL NAA+PROBE: NEGATIVE

## 2024-11-23 PROCEDURE — 87502 INFLUENZA DNA AMP PROBE: CPT | Mod: RHCUB | Performed by: FAMILY MEDICINE

## 2024-11-23 PROCEDURE — 96372 THER/PROPH/DIAG INJ SC/IM: CPT | Mod: ,,, | Performed by: FAMILY MEDICINE

## 2024-11-23 PROCEDURE — 87635 SARS-COV-2 COVID-19 AMP PRB: CPT | Mod: RHCUB | Performed by: FAMILY MEDICINE

## 2024-11-23 PROCEDURE — 87651 STREP A DNA AMP PROBE: CPT | Mod: RHCUB | Performed by: FAMILY MEDICINE

## 2024-11-23 PROCEDURE — 99214 OFFICE O/P EST MOD 30 MIN: CPT | Mod: ,,, | Performed by: FAMILY MEDICINE

## 2024-11-23 RX ORDER — BENZONATATE 100 MG/1
100 CAPSULE ORAL 3 TIMES DAILY PRN
Qty: 20 CAPSULE | Refills: 0 | Status: SHIPPED | OUTPATIENT
Start: 2024-11-23

## 2024-11-23 RX ORDER — PREDNISONE 20 MG/1
20 TABLET ORAL DAILY
Qty: 3 TABLET | Refills: 0 | Status: SHIPPED | OUTPATIENT
Start: 2024-11-23 | End: 2024-11-26

## 2024-11-23 RX ORDER — AMOXICILLIN AND CLAVULANATE POTASSIUM 875; 125 MG/1; MG/1
1 TABLET, FILM COATED ORAL 2 TIMES DAILY
Qty: 14 TABLET | Refills: 0 | Status: SHIPPED | OUTPATIENT
Start: 2024-11-23 | End: 2024-11-30

## 2024-11-23 RX ORDER — CEFTRIAXONE 500 MG/1
500 INJECTION, POWDER, FOR SOLUTION INTRAMUSCULAR; INTRAVENOUS
Status: COMPLETED | OUTPATIENT
Start: 2024-11-23 | End: 2024-11-23

## 2024-11-23 RX ADMIN — CEFTRIAXONE 500 MG: 500 INJECTION, POWDER, FOR SOLUTION INTRAMUSCULAR; INTRAVENOUS at 10:11

## 2024-11-23 NOTE — PROGRESS NOTES
Subjective:       Patient ID: Flaquita Sosa is a 79 y.o. female.    Chief Complaint: Hoarse (1 week), Nasal Congestion, and Cough    Cough  Associated symptoms include postnasal drip and rhinorrhea. Pertinent negatives include no chest pain, chills, ear pain, fever, headaches, myalgias, rash, sore throat, shortness of breath or wheezing. There is no history of environmental allergies.     Review of Systems   Constitutional:  Negative for activity change, appetite change, chills, diaphoresis, fatigue, fever and unexpected weight change.   HENT:  Positive for postnasal drip, rhinorrhea and sinus pressure/congestion. Negative for nasal congestion, dental problem, drooling, ear discharge, ear pain, facial swelling, hearing loss, mouth sores, nosebleeds, sneezing, sore throat, tinnitus, trouble swallowing, voice change and goiter.    Eyes:  Negative for photophobia, discharge, itching and visual disturbance.   Respiratory:  Positive for cough. Negative for apnea, choking, chest tightness, shortness of breath, wheezing and stridor.    Cardiovascular:  Negative for chest pain, palpitations, leg swelling and claudication.   Gastrointestinal:  Negative for abdominal distention, abdominal pain, anal bleeding, blood in stool, change in bowel habit, constipation, diarrhea, nausea and vomiting.   Endocrine: Negative for cold intolerance, heat intolerance, polydipsia, polyphagia and polyuria.   Genitourinary:  Negative for bladder incontinence, decreased urine volume, difficulty urinating, dysuria, enuresis, flank pain, frequency, hematuria, nocturia, pelvic pain and urgency.   Musculoskeletal:  Negative for arthralgias, back pain, gait problem, joint swelling, leg pain, myalgias, neck pain, neck stiffness and joint deformity.   Integumentary:  Negative for pallor, rash, wound, breast mass and breast tenderness.   Allergic/Immunologic: Negative for environmental allergies, food allergies and immunocompromised state.    Neurological:  Negative for dizziness, vertigo, tremors, seizures, syncope, facial asymmetry, speech difficulty, weakness, light-headedness, numbness, headaches, memory loss and coordination difficulties.   Hematological:  Negative for adenopathy. Does not bruise/bleed easily.   Psychiatric/Behavioral:  Negative for agitation, behavioral problems, confusion, decreased concentration, dysphoric mood, hallucinations, self-injury, sleep disturbance and suicidal ideas. The patient is not nervous/anxious and is not hyperactive.    Breast: Negative for mass and tenderness        Objective:      Physical Exam  Vitals reviewed.   Constitutional:       Appearance: Normal appearance.   HENT:      Head: Normocephalic and atraumatic.      Right Ear: Tympanic membrane, ear canal and external ear normal.      Left Ear: Tympanic membrane, ear canal and external ear normal.      Nose: Congestion and rhinorrhea present.      Mouth/Throat:      Mouth: Mucous membranes are moist.      Pharynx: Oropharynx is clear. Posterior oropharyngeal erythema present.   Eyes:      Extraocular Movements: Extraocular movements intact.      Conjunctiva/sclera: Conjunctivae normal.      Pupils: Pupils are equal, round, and reactive to light.   Cardiovascular:      Rate and Rhythm: Normal rate and regular rhythm.      Pulses: Normal pulses.      Heart sounds: Normal heart sounds.   Pulmonary:      Effort: Pulmonary effort is normal.      Breath sounds: Normal breath sounds.   Abdominal:      General: Bowel sounds are normal.      Palpations: Abdomen is soft.   Musculoskeletal:         General: Normal range of motion.      Cervical back: Normal range of motion and neck supple.   Skin:     General: Skin is warm and dry.   Neurological:      General: No focal deficit present.      Mental Status: She is alert. Mental status is at baseline.   Psychiatric:         Mood and Affect: Mood normal.         Behavior: Behavior normal.         Thought Content:  Thought content normal.         Judgment: Judgment normal.         Assessment:       1. Sinusitis, unspecified chronicity, unspecified location    2. Exposure to viral disease        Plan:     Sinusitis, unspecified chronicity, unspecified location  -     cefTRIAXone injection 500 mg  -     amoxicillin-clavulanate 875-125mg (AUGMENTIN) 875-125 mg per tablet; Take 1 tablet by mouth 2 (two) times a day. for 7 days  Dispense: 14 tablet; Refill: 0  -     predniSONE (DELTASONE) 20 MG tablet; Take 1 tablet (20 mg total) by mouth once daily. for 3 days  Dispense: 3 tablet; Refill: 0  -     benzonatate (TESSALON) 100 MG capsule; Take 1 capsule (100 mg total) by mouth 3 (three) times daily as needed for Cough.  Dispense: 20 capsule; Refill: 0    Exposure to viral disease  -     POCT COVID-19 Rapid Screening  -     POCT Influenza A/B Molecular  -     POCT Strep A, Molecular

## 2024-11-23 NOTE — LETTER
November 23, 2024      [facility]  [address]  [place] [address]  Phone: [phone]  Fax: [phone]       Patient: Flaquita Sosa   YOB: 1945  Date of Visit: 11/23/2024    To Whom It May Concern:    Bogdan Sosa  was at [facility] on 11/23/2024. The patient may return to work/school on 11/27/2024 with no restrictions. If you have any questions or concerns, or if I can be of further assistance, please do not hesitate to contact me.    Sincerely,  Ariel serna,DO

## 2024-12-13 ENCOUNTER — OFFICE VISIT (OUTPATIENT)
Dept: FAMILY MEDICINE | Facility: CLINIC | Age: 79
End: 2024-12-13
Payer: MEDICARE

## 2024-12-13 VITALS
DIASTOLIC BLOOD PRESSURE: 76 MMHG | SYSTOLIC BLOOD PRESSURE: 110 MMHG | BODY MASS INDEX: 26 KG/M2 | OXYGEN SATURATION: 98 % | WEIGHT: 171 LBS | TEMPERATURE: 99 F

## 2024-12-13 DIAGNOSIS — H61.23 BILATERAL IMPACTED CERUMEN: ICD-10-CM

## 2024-12-13 DIAGNOSIS — J01.90 ACUTE NON-RECURRENT SINUSITIS, UNSPECIFIED LOCATION: Primary | ICD-10-CM

## 2024-12-13 PROCEDURE — 99213 OFFICE O/P EST LOW 20 MIN: CPT | Mod: ,,, | Performed by: NURSE PRACTITIONER

## 2024-12-13 RX ORDER — AZELASTINE 1 MG/ML
1 SPRAY, METERED NASAL 2 TIMES DAILY
Qty: 30 ML | Refills: 0 | Status: SHIPPED | OUTPATIENT
Start: 2024-12-13 | End: 2025-12-13

## 2024-12-13 RX ORDER — PREDNISONE 10 MG/1
10 TABLET ORAL DAILY
Qty: 5 TABLET | Refills: 0 | Status: SHIPPED | OUTPATIENT
Start: 2024-12-13 | End: 2024-12-18

## 2024-12-13 RX ORDER — CEFDINIR 300 MG/1
300 CAPSULE ORAL 2 TIMES DAILY
Qty: 20 CAPSULE | Refills: 0 | Status: SHIPPED | OUTPATIENT
Start: 2024-12-13 | End: 2024-12-23

## 2024-12-13 RX ORDER — LEVOCETIRIZINE DIHYDROCHLORIDE 5 MG/1
5 TABLET, FILM COATED ORAL NIGHTLY
Qty: 30 TABLET | Refills: 0 | Status: SHIPPED | OUTPATIENT
Start: 2024-12-13 | End: 2025-01-12

## 2024-12-13 RX ORDER — CIPROFLOXACIN HYDROCHLORIDE 3 MG/ML
SOLUTION/ DROPS OPHTHALMIC
Qty: 5 ML | Refills: 0 | Status: SHIPPED | OUTPATIENT
Start: 2024-12-13

## 2024-12-13 NOTE — PROGRESS NOTES
Subjective:       Patient ID: Flaquita Sosa is a 79 y.o. female.    Chief Complaint: Nasal Congestion and Generalized Body Aches (Was seen 2 weeks ago for same symptoms. Pt states she doesn't think she ever got over it.)    Nasal congestion and body aches 3 weeks  Review of Systems   Constitutional:  Negative for appetite change, fatigue and fever.   HENT:  Positive for nasal congestion. Negative for ear pain and sore throat.    Eyes:  Negative for pain, discharge and itching.   Respiratory:  Negative for cough and shortness of breath.    Cardiovascular:  Negative for chest pain and leg swelling.   Gastrointestinal:  Negative for abdominal pain, change in bowel habit, nausea and vomiting.   Musculoskeletal:  Positive for myalgias. Negative for back pain, gait problem and neck pain.   Integumentary:  Negative for rash and wound.   Allergic/Immunologic: Negative for immunocompromised state.   Neurological:  Negative for dizziness, weakness and headaches.   All other systems reviewed and are negative.        Objective:      Physical Exam  Vitals and nursing note reviewed.   Constitutional:       General: She is not in acute distress.     Appearance: Normal appearance. She is not ill-appearing, toxic-appearing or diaphoretic.   HENT:      Head: Normocephalic.      Right Ear: Tympanic membrane, ear canal and external ear normal. There is impacted cerumen.      Left Ear: Tympanic membrane, ear canal and external ear normal. There is impacted cerumen.      Nose: Mucosal edema, congestion and rhinorrhea present.      Right Turbinates: Swollen.      Left Turbinates: Swollen.      Mouth/Throat:      Mouth: Mucous membranes are moist.      Pharynx: Posterior oropharyngeal erythema and postnasal drip present. No oropharyngeal exudate.   Eyes:      General: No scleral icterus.        Right eye: No discharge.         Left eye: No discharge.      Extraocular Movements: Extraocular movements intact.      Conjunctiva/sclera:  Conjunctivae normal.      Pupils: Pupils are equal, round, and reactive to light.   Cardiovascular:      Rate and Rhythm: Normal rate and regular rhythm.      Pulses: Normal pulses.      Heart sounds: Normal heart sounds. No murmur heard.  Pulmonary:      Effort: Pulmonary effort is normal. No respiratory distress.      Breath sounds: Normal breath sounds. No wheezing, rhonchi or rales.   Musculoskeletal:         General: Normal range of motion.      Cervical back: Neck supple. No tenderness.   Lymphadenopathy:      Cervical: No cervical adenopathy.   Skin:     General: Skin is warm and dry.      Capillary Refill: Capillary refill takes less than 2 seconds.      Findings: No rash.   Neurological:      Mental Status: She is alert and oriented to person, place, and time.   Psychiatric:         Mood and Affect: Mood normal.         Behavior: Behavior normal.         Thought Content: Thought content normal.         Judgment: Judgment normal.          Assessment:       1. Acute non-recurrent sinusitis, unspecified location        Plan:   Acute non-recurrent sinusitis, unspecified location  -     azelastine (ASTELIN) 137 mcg (0.1 %) nasal spray; 1 spray (137 mcg total) by Nasal route 2 (two) times daily.  Dispense: 30 mL; Refill: 0  -     levocetirizine (XYZAL) 5 MG tablet; Take 1 tablet (5 mg total) by mouth every evening.  Dispense: 30 tablet; Refill: 0  -     cefdinir (OMNICEF) 300 MG capsule; Take 1 capsule (300 mg total) by mouth 2 (two) times daily. for 10 days  Dispense: 20 capsule; Refill: 0  -     predniSONE (DELTASONE) 10 MG tablet; Take 1 tablet (10 mg total) by mouth once daily. for 5 days  Dispense: 5 tablet; Refill: 0           Risks, benefits, and side effects were discussed with the patient. All questions were answered to the fullest satisfaction of the patient, and pt verbalized understanding and agreement to treatment plan. Pt was to call with any new or worsening symptoms, or present to the ER

## 2024-12-13 NOTE — PROCEDURES
Ear Cerumen Removal    Date/Time: 12/13/2024 10:15 AM    Performed by: Vandana Miller FNP  Authorized by: Vandana Miller FNP      Local anesthetic:  None  Medication Used:  Debrox  Location details:  Both ears  Procedure type: curette and irrigation    Cerumen  Removal Results:  Cerumen completely removed  Patient tolerance:  Patient tolerated the procedure well with no immediate complications

## 2025-01-16 ENCOUNTER — HOSPITAL ENCOUNTER (INPATIENT)
Facility: HOSPITAL | Age: 80
LOS: 1 days | Discharge: HOME | DRG: 291 | End: 2025-01-18
Attending: EMERGENCY MEDICINE | Admitting: INTERNAL MEDICINE
Payer: MEDICARE

## 2025-01-16 DIAGNOSIS — R07.9 CHEST PAIN: ICD-10-CM

## 2025-01-16 DIAGNOSIS — I48.91 ATRIAL FIBRILLATION: ICD-10-CM

## 2025-01-16 DIAGNOSIS — I48.0 PAROXYSMAL ATRIAL FIBRILLATION: ICD-10-CM

## 2025-01-16 DIAGNOSIS — I50.23 ACUTE ON CHRONIC SYSTOLIC HEART FAILURE: Primary | ICD-10-CM

## 2025-01-16 DIAGNOSIS — I10 HYPERTENSION, UNSPECIFIED TYPE: ICD-10-CM

## 2025-01-16 DIAGNOSIS — I50.21 ACUTE SYSTOLIC CONGESTIVE HEART FAILURE: ICD-10-CM

## 2025-01-16 DIAGNOSIS — I27.20 PULMONARY HYPERTENSION: ICD-10-CM

## 2025-01-16 PROBLEM — E78.5 DYSLIPIDEMIA: Status: ACTIVE | Noted: 2025-01-16

## 2025-01-16 PROBLEM — I50.9 ACUTE ON CHRONIC HEART FAILURE: Status: ACTIVE | Noted: 2025-01-16

## 2025-01-16 LAB
ALBUMIN SERPL BCP-MCNC: 3.7 G/DL (ref 3.4–4.8)
ALBUMIN/GLOB SERPL: 1.2 {RATIO}
ALP SERPL-CCNC: 100 U/L (ref 40–150)
ALT SERPL W P-5'-P-CCNC: 23 U/L
ANION GAP SERPL CALCULATED.3IONS-SCNC: 18 MMOL/L (ref 7–16)
AORTIC ROOT ANNULUS: 2.63 CM
AORTIC VALVE CUSP SEPERATION: 2.3 CM
APICAL FOUR CHAMBER EJECTION FRACTION: 20 %
APTT PPP: 28.3 SECONDS (ref 25.2–37.3)
AST SERPL W P-5'-P-CCNC: 30 U/L (ref 5–34)
AV INDEX (PROSTH): 0.84
AV MEAN GRADIENT: 3 MMHG
AV PEAK GRADIENT: 7 MMHG
AV REGURGITATION PRESSURE HALF TIME: 395 MS
AV VALVE AREA BY VELOCITY RATIO: 2.2 CM²
AV VALVE AREA: 3.5 CM²
AV VELOCITY RATIO: 0.54
BASOPHILS # BLD AUTO: 0.06 K/UL (ref 0–0.2)
BASOPHILS NFR BLD AUTO: 0.4 % (ref 0–1)
BILIRUB SERPL-MCNC: 0.4 MG/DL
BSA FOR ECHO PROCEDURE: 1.87 M2
BUN SERPL-MCNC: 35 MG/DL (ref 10–20)
BUN/CREAT SERPL: 20 (ref 6–20)
CALCIUM SERPL-MCNC: 8.8 MG/DL (ref 8.4–10.2)
CHLORIDE SERPL-SCNC: 106 MMOL/L (ref 98–107)
CO2 SERPL-SCNC: 21 MMOL/L (ref 23–31)
CREAT SERPL-MCNC: 1.74 MG/DL (ref 0.55–1.02)
CV ECHO LV RWT: 0.5 CM
DIFFERENTIAL METHOD BLD: ABNORMAL
DOP CALC AO PEAK VEL: 1.3 M/S
DOP CALC AO VTI: 16 CM
DOP CALC LVOT AREA: 4.2 CM2
DOP CALC LVOT DIAMETER: 2.3 CM
DOP CALC LVOT PEAK VEL: 0.7 M/S
DOP CALC LVOT STROKE VOLUME: 56.1 CM3
DOP CALCLVOT PEAK VEL VTI: 13.5 CM
ECHO LV POSTERIOR WALL: 1.3 CM (ref 0.6–1.1)
EGFR (NO RACE VARIABLE) (RUSH/TITUS): 30 ML/MIN/1.73M2
EOSINOPHIL # BLD AUTO: 0.06 K/UL (ref 0–0.5)
EOSINOPHIL NFR BLD AUTO: 0.4 % (ref 1–4)
ERYTHROCYTE [DISTWIDTH] IN BLOOD BY AUTOMATED COUNT: 13.3 % (ref 11.5–14.5)
FRACTIONAL SHORTENING: 11.5 % (ref 28–44)
GLOBULIN SER-MCNC: 3.2 G/DL (ref 2–4)
GLUCOSE SERPL-MCNC: 103 MG/DL (ref 82–115)
HCT VFR BLD AUTO: 42.2 % (ref 38–47)
HGB BLD-MCNC: 12.5 G/DL (ref 12–16)
IMM GRANULOCYTES # BLD AUTO: 0.08 K/UL (ref 0–0.04)
IMM GRANULOCYTES NFR BLD: 0.5 % (ref 0–0.4)
INR BLD: 1.36
INTERVENTRICULAR SEPTUM: 1.6 CM (ref 0.6–1.1)
IVC DIAMETER: 2.59 CM
LEFT ATRIUM AREA SYSTOLIC (APICAL 4 CHAMBER): 23.62 CM2
LEFT ATRIUM SIZE: 4.6 CM
LEFT INTERNAL DIMENSION IN SYSTOLE: 4.6 CM (ref 2.1–4)
LEFT VENTRICLE DIASTOLIC VOLUME INDEX: 69.41 ML/M2
LEFT VENTRICLE DIASTOLIC VOLUME: 129.11 ML
LEFT VENTRICLE END DIASTOLIC VOLUME APICAL 4 CHAMBER: 67.93 ML
LEFT VENTRICLE END SYSTOLIC VOLUME APICAL 4 CHAMBER: 69.24 ML
LEFT VENTRICLE MASS INDEX: 175.1 G/M2
LEFT VENTRICLE SYSTOLIC VOLUME INDEX: 51.8 ML/M2
LEFT VENTRICLE SYSTOLIC VOLUME: 96.26 ML
LEFT VENTRICULAR INTERNAL DIMENSION IN DIASTOLE: 5.2 CM (ref 3.5–6)
LEFT VENTRICULAR MASS: 325.8 G
LVED V (TEICH): 129.11 ML
LVES V (TEICH): 96.26 ML
LVOT MG: 1.17 MMHG
LVOT MV: 0.52 CM/S
LYMPHOCYTES # BLD AUTO: 2.05 K/UL (ref 1–4.8)
LYMPHOCYTES NFR BLD AUTO: 12.6 % (ref 27–41)
MACROCYTES BLD QL SMEAR: ABNORMAL
MAGNESIUM SERPL-MCNC: 2.1 MG/DL (ref 1.6–2.6)
MCH RBC QN AUTO: 32.6 PG (ref 27–31)
MCHC RBC AUTO-ENTMCNC: 29.6 G/DL (ref 32–36)
MCV RBC AUTO: 109.9 FL (ref 80–96)
MONOCYTES # BLD AUTO: 1.13 K/UL (ref 0–0.8)
MONOCYTES NFR BLD AUTO: 6.9 % (ref 2–6)
MPC BLD CALC-MCNC: 11.3 FL (ref 9.4–12.4)
NEUTROPHILS # BLD AUTO: 12.93 K/UL (ref 1.8–7.7)
NEUTROPHILS NFR BLD AUTO: 79.2 % (ref 53–65)
NRBC # BLD AUTO: 0 X10E3/UL
NRBC, AUTO (.00): 0 %
NT-PROBNP SERPL-MCNC: ABNORMAL PG/ML (ref 1–450)
OHS CV RV/LV RATIO: 0.92 CM
OHS LV EJECTION FRACTION SIMPSONS BIPLANE MOD: 17 %
OHS QRS DURATION: 190 MS
OHS QTC CALCULATION: 465 MS
PISA AR MAX VEL: 4.19 M/S
PISA MRMAX VEL: 4.83 M/S
PISA TR MAX VEL: 3.4 M/S
PLATELET # BLD AUTO: 217 K/UL (ref 150–400)
PLATELET MORPHOLOGY: ABNORMAL
POLYCHROMASIA BLD QL SMEAR: ABNORMAL
POTASSIUM SERPL-SCNC: 3.6 MMOL/L (ref 3.5–5.1)
PROT SERPL-MCNC: 6.9 G/DL (ref 5.8–7.6)
PROTHROMBIN TIME: 16.6 SECONDS (ref 11.7–14.7)
PV PEAK GRADIENT: 4 MMHG
PV PEAK VELOCITY: 0.99 M/S
RA MAJOR: 4.99 CM
RA PRESSURE ESTIMATED: 15 MMHG
RBC # BLD AUTO: 3.84 M/UL (ref 4.2–5.4)
RIGHT VENTRICLE DIASTOLIC BASEL DIMENSION: 4.8 CM
RIGHT VENTRICLE DIASTOLIC LENGTH: 5.9 CM
RIGHT VENTRICLE DIASTOLIC MID DIMENSION: 4.4 CM
RIGHT VENTRICULAR LENGTH IN DIASTOLE (APICAL 4-CHAMBER VIEW): 5.85 CM
RV MID DIAMA: 4.4 CM
RV TB RVSP: 18 MMHG
SODIUM SERPL-SCNC: 141 MMOL/L (ref 136–145)
TDI LATERAL: 0.14 M/S
TDI SEPTAL: 0.03 M/S
TDI: 0.09 M/S
TR MAX PG: 46 MMHG
TRICUSPID ANNULAR PLANE SYSTOLIC EXCURSION: 1.22 CM
TROPONIN I SERPL HS-MCNC: 28.6 NG/L
TROPONIN I SERPL HS-MCNC: 30.3 NG/L
TROPONIN I SERPL HS-MCNC: 39.5 NG/L
TROPONIN I SERPL HS-MCNC: 42.4 NG/L
TV REST PULMONARY ARTERY PRESSURE: 61 MMHG
WBC # BLD AUTO: 16.31 K/UL (ref 4.5–11)
Z-SCORE OF LEFT VENTRICULAR DIMENSION IN END DIASTOLE: 0.05
Z-SCORE OF LEFT VENTRICULAR DIMENSION IN END SYSTOLE: 2.89

## 2025-01-16 PROCEDURE — 84165 PROTEIN E-PHORESIS SERUM: CPT | Mod: 26,,, | Performed by: PATHOLOGY

## 2025-01-16 PROCEDURE — 36415 COLL VENOUS BLD VENIPUNCTURE: CPT | Performed by: STUDENT IN AN ORGANIZED HEALTH CARE EDUCATION/TRAINING PROGRAM

## 2025-01-16 PROCEDURE — 83880 ASSAY OF NATRIURETIC PEPTIDE: CPT | Performed by: EMERGENCY MEDICINE

## 2025-01-16 PROCEDURE — 80053 COMPREHEN METABOLIC PANEL: CPT | Performed by: EMERGENCY MEDICINE

## 2025-01-16 PROCEDURE — 85610 PROTHROMBIN TIME: CPT | Performed by: EMERGENCY MEDICINE

## 2025-01-16 PROCEDURE — 25000003 PHARM REV CODE 250: Performed by: EMERGENCY MEDICINE

## 2025-01-16 PROCEDURE — 93010 ELECTROCARDIOGRAM REPORT: CPT | Mod: ,,, | Performed by: STUDENT IN AN ORGANIZED HEALTH CARE EDUCATION/TRAINING PROGRAM

## 2025-01-16 PROCEDURE — 36415 COLL VENOUS BLD VENIPUNCTURE: CPT | Performed by: EMERGENCY MEDICINE

## 2025-01-16 PROCEDURE — 93010 ELECTROCARDIOGRAM REPORT: CPT | Mod: 77,,, | Performed by: INTERNAL MEDICINE

## 2025-01-16 PROCEDURE — 96366 THER/PROPH/DIAG IV INF ADDON: CPT

## 2025-01-16 PROCEDURE — 99285 EMERGENCY DEPT VISIT HI MDM: CPT | Mod: 25

## 2025-01-16 PROCEDURE — 25000003 PHARM REV CODE 250: Performed by: INTERNAL MEDICINE

## 2025-01-16 PROCEDURE — 84165 PROTEIN E-PHORESIS SERUM: CPT | Performed by: STUDENT IN AN ORGANIZED HEALTH CARE EDUCATION/TRAINING PROGRAM

## 2025-01-16 PROCEDURE — 25000003 PHARM REV CODE 250: Performed by: STUDENT IN AN ORGANIZED HEALTH CARE EDUCATION/TRAINING PROGRAM

## 2025-01-16 PROCEDURE — 84484 ASSAY OF TROPONIN QUANT: CPT | Mod: 91 | Performed by: INTERNAL MEDICINE

## 2025-01-16 PROCEDURE — G0378 HOSPITAL OBSERVATION PER HR: HCPCS

## 2025-01-16 PROCEDURE — 84484 ASSAY OF TROPONIN QUANT: CPT | Performed by: EMERGENCY MEDICINE

## 2025-01-16 PROCEDURE — 96365 THER/PROPH/DIAG IV INF INIT: CPT

## 2025-01-16 PROCEDURE — 63600175 PHARM REV CODE 636 W HCPCS: Performed by: STUDENT IN AN ORGANIZED HEALTH CARE EDUCATION/TRAINING PROGRAM

## 2025-01-16 PROCEDURE — 85025 COMPLETE CBC W/AUTO DIFF WBC: CPT | Performed by: EMERGENCY MEDICINE

## 2025-01-16 PROCEDURE — 93005 ELECTROCARDIOGRAM TRACING: CPT

## 2025-01-16 PROCEDURE — 99215 OFFICE O/P EST HI 40 MIN: CPT | Mod: 25,,, | Performed by: STUDENT IN AN ORGANIZED HEALTH CARE EDUCATION/TRAINING PROGRAM

## 2025-01-16 PROCEDURE — 85730 THROMBOPLASTIN TIME PARTIAL: CPT | Performed by: EMERGENCY MEDICINE

## 2025-01-16 PROCEDURE — 96367 TX/PROPH/DG ADDL SEQ IV INF: CPT

## 2025-01-16 PROCEDURE — 83735 ASSAY OF MAGNESIUM: CPT | Performed by: EMERGENCY MEDICINE

## 2025-01-16 PROCEDURE — 93010 ELECTROCARDIOGRAM REPORT: CPT | Mod: 76,,, | Performed by: STUDENT IN AN ORGANIZED HEALTH CARE EDUCATION/TRAINING PROGRAM

## 2025-01-16 PROCEDURE — 83521 IG LIGHT CHAINS FREE EACH: CPT | Mod: 90 | Performed by: STUDENT IN AN ORGANIZED HEALTH CARE EDUCATION/TRAINING PROGRAM

## 2025-01-16 RX ORDER — METOPROLOL SUCCINATE 100 MG/1
100 TABLET, EXTENDED RELEASE ORAL DAILY
Status: ON HOLD | COMMUNITY
Start: 2025-01-16 | End: 2025-01-18 | Stop reason: HOSPADM

## 2025-01-16 RX ORDER — IBUPROFEN 200 MG
16 TABLET ORAL
Status: DISCONTINUED | OUTPATIENT
Start: 2025-01-16 | End: 2025-01-18 | Stop reason: HOSPADM

## 2025-01-16 RX ORDER — DIAZEPAM 5 MG/1
5 TABLET ORAL EVERY 12 HOURS PRN
Status: DISCONTINUED | OUTPATIENT
Start: 2025-01-16 | End: 2025-01-18 | Stop reason: HOSPADM

## 2025-01-16 RX ORDER — ONDANSETRON HYDROCHLORIDE 2 MG/ML
4 INJECTION, SOLUTION INTRAVENOUS EVERY 6 HOURS PRN
Status: DISCONTINUED | OUTPATIENT
Start: 2025-01-16 | End: 2025-01-18 | Stop reason: HOSPADM

## 2025-01-16 RX ORDER — METOPROLOL SUCCINATE 100 MG/1
100 TABLET, EXTENDED RELEASE ORAL ONCE
Status: COMPLETED | OUTPATIENT
Start: 2025-01-16 | End: 2025-01-16

## 2025-01-16 RX ORDER — ACETAMINOPHEN 325 MG/1
650 TABLET ORAL EVERY 6 HOURS PRN
Status: DISCONTINUED | OUTPATIENT
Start: 2025-01-16 | End: 2025-01-18 | Stop reason: HOSPADM

## 2025-01-16 RX ORDER — METOPROLOL SUCCINATE 100 MG/1
100 TABLET, EXTENDED RELEASE ORAL DAILY
Status: DISCONTINUED | OUTPATIENT
Start: 2025-01-16 | End: 2025-01-16

## 2025-01-16 RX ORDER — FUROSEMIDE 10 MG/ML
40 INJECTION INTRAMUSCULAR; INTRAVENOUS
Status: DISCONTINUED | OUTPATIENT
Start: 2025-01-16 | End: 2025-01-18 | Stop reason: HOSPADM

## 2025-01-16 RX ORDER — DIGOXIN 0.25 MG/ML
500 INJECTION INTRAMUSCULAR; INTRAVENOUS ONCE
Status: COMPLETED | OUTPATIENT
Start: 2025-01-16 | End: 2025-01-16

## 2025-01-16 RX ORDER — OLMESARTAN MEDOXOMIL 5 MG/1
5 TABLET ORAL DAILY
Status: ON HOLD | COMMUNITY
Start: 2024-12-31 | End: 2025-01-18 | Stop reason: HOSPADM

## 2025-01-16 RX ORDER — PRAVASTATIN SODIUM 40 MG/1
40 TABLET ORAL NIGHTLY
Status: DISCONTINUED | OUTPATIENT
Start: 2025-01-16 | End: 2025-01-18 | Stop reason: HOSPADM

## 2025-01-16 RX ORDER — SACUBITRIL AND VALSARTAN 24; 26 MG/1; MG/1
1 TABLET, FILM COATED ORAL 2 TIMES DAILY
Status: DISCONTINUED | OUTPATIENT
Start: 2025-01-16 | End: 2025-01-18 | Stop reason: HOSPADM

## 2025-01-16 RX ORDER — AMIODARONE HYDROCHLORIDE 200 MG/1
200 TABLET ORAL ONCE
Status: COMPLETED | OUTPATIENT
Start: 2025-01-16 | End: 2025-01-16

## 2025-01-16 RX ORDER — DOCUSATE SODIUM 100 MG/1
100 CAPSULE, LIQUID FILLED ORAL 2 TIMES DAILY
Status: DISCONTINUED | OUTPATIENT
Start: 2025-01-16 | End: 2025-01-18 | Stop reason: HOSPADM

## 2025-01-16 RX ORDER — IBUPROFEN 200 MG
24 TABLET ORAL
Status: DISCONTINUED | OUTPATIENT
Start: 2025-01-16 | End: 2025-01-18 | Stop reason: HOSPADM

## 2025-01-16 RX ORDER — GLUCAGON 1 MG
1 KIT INJECTION
Status: DISCONTINUED | OUTPATIENT
Start: 2025-01-16 | End: 2025-01-18 | Stop reason: HOSPADM

## 2025-01-16 RX ORDER — METOPROLOL TARTRATE 1 MG/ML
5 INJECTION, SOLUTION INTRAVENOUS
Status: COMPLETED | OUTPATIENT
Start: 2025-01-16 | End: 2025-01-16

## 2025-01-16 RX ORDER — AMIODARONE HYDROCHLORIDE 200 MG/1
200 TABLET ORAL 2 TIMES DAILY
Status: DISCONTINUED | OUTPATIENT
Start: 2025-01-16 | End: 2025-01-16

## 2025-01-16 RX ORDER — AMIODARONE HYDROCHLORIDE 200 MG/1
200 TABLET ORAL 2 TIMES DAILY
Status: ON HOLD | COMMUNITY
Start: 2025-01-15 | End: 2025-01-18

## 2025-01-16 RX ORDER — DOPAMINE HCL IN DEXTROSE 5 % 400MG/.25L
2 INFUSION BOTTLE (ML) INTRAVENOUS CONTINUOUS
Status: DISCONTINUED | OUTPATIENT
Start: 2025-01-16 | End: 2025-01-18

## 2025-01-16 RX ORDER — FUROSEMIDE 10 MG/ML
40 INJECTION INTRAMUSCULAR; INTRAVENOUS EVERY 12 HOURS
Status: DISCONTINUED | OUTPATIENT
Start: 2025-01-16 | End: 2025-01-16

## 2025-01-16 RX ORDER — DILTIAZEM HYDROCHLORIDE 5 MG/ML
10 INJECTION INTRAVENOUS
Status: COMPLETED | OUTPATIENT
Start: 2025-01-16 | End: 2025-01-16

## 2025-01-16 RX ORDER — HYDROCODONE BITARTRATE AND ACETAMINOPHEN 5; 325 MG/1; MG/1
1 TABLET ORAL EVERY 6 HOURS PRN
Status: DISCONTINUED | OUTPATIENT
Start: 2025-01-16 | End: 2025-01-18 | Stop reason: HOSPADM

## 2025-01-16 RX ORDER — VALACYCLOVIR HYDROCHLORIDE 500 MG/1
500 TABLET, FILM COATED ORAL DAILY
COMMUNITY
Start: 2024-11-13 | End: 2025-01-16

## 2025-01-16 RX ORDER — LANOLIN ALCOHOL/MO/W.PET/CERES
1 CREAM (GRAM) TOPICAL DAILY
Status: DISCONTINUED | OUTPATIENT
Start: 2025-01-16 | End: 2025-01-18 | Stop reason: HOSPADM

## 2025-01-16 RX ORDER — AMIODARONE HYDROCHLORIDE 200 MG/1
200 TABLET ORAL 2 TIMES DAILY
Status: DISCONTINUED | OUTPATIENT
Start: 2025-01-16 | End: 2025-01-17

## 2025-01-16 RX ORDER — METOPROLOL SUCCINATE 25 MG/1
50 TABLET, EXTENDED RELEASE ORAL DAILY
Status: DISCONTINUED | OUTPATIENT
Start: 2025-01-16 | End: 2025-01-18 | Stop reason: HOSPADM

## 2025-01-16 RX ORDER — FAMOTIDINE 10 MG/ML
20 INJECTION INTRAVENOUS 2 TIMES DAILY
Status: DISCONTINUED | OUTPATIENT
Start: 2025-01-16 | End: 2025-01-17

## 2025-01-16 RX ORDER — DIGOXIN 125 MCG
0.12 TABLET ORAL DAILY
Status: DISCONTINUED | OUTPATIENT
Start: 2025-01-17 | End: 2025-01-18 | Stop reason: HOSPADM

## 2025-01-16 RX ORDER — SODIUM CHLORIDE 0.9 % (FLUSH) 0.9 %
10 SYRINGE (ML) INJECTION EVERY 12 HOURS PRN
Status: DISCONTINUED | OUTPATIENT
Start: 2025-01-16 | End: 2025-01-18 | Stop reason: HOSPADM

## 2025-01-16 RX ORDER — DILTIAZEM HYDROCHLORIDE 5 MG/ML
20 INJECTION INTRAVENOUS
Status: DISCONTINUED | OUTPATIENT
Start: 2025-01-16 | End: 2025-01-16

## 2025-01-16 RX ORDER — NALOXONE HCL 0.4 MG/ML
0.02 VIAL (ML) INJECTION
Status: DISCONTINUED | OUTPATIENT
Start: 2025-01-16 | End: 2025-01-18 | Stop reason: HOSPADM

## 2025-01-16 RX ADMIN — APIXABAN 2.5 MG: 2.5 TABLET, FILM COATED ORAL at 12:01

## 2025-01-16 RX ADMIN — AMIODARONE HYDROCHLORIDE 200 MG: 200 TABLET ORAL at 08:01

## 2025-01-16 RX ADMIN — DOPAMINE HYDROCHLORIDE 2 MCG/KG/MIN: 160 INJECTION, SOLUTION INTRAVENOUS at 12:01

## 2025-01-16 RX ADMIN — EMPAGLIFLOZIN 10 MG: 10 TABLET, FILM COATED ORAL at 12:01

## 2025-01-16 RX ADMIN — SACUBITRIL AND VALSARTAN 1 TABLET: 24; 26 TABLET, FILM COATED ORAL at 09:01

## 2025-01-16 RX ADMIN — DOCUSATE SODIUM 100 MG: 100 CAPSULE, LIQUID FILLED ORAL at 08:01

## 2025-01-16 RX ADMIN — SACUBITRIL AND VALSARTAN 1 TABLET: 24; 26 TABLET, FILM COATED ORAL at 08:01

## 2025-01-16 RX ADMIN — APIXABAN 2.5 MG: 2.5 TABLET, FILM COATED ORAL at 08:01

## 2025-01-16 RX ADMIN — PRAVASTATIN SODIUM 40 MG: 40 TABLET ORAL at 08:01

## 2025-01-16 RX ADMIN — METOPROLOL SUCCINATE 100 MG: 100 TABLET, EXTENDED RELEASE ORAL at 07:01

## 2025-01-16 RX ADMIN — FAMOTIDINE 20 MG: 10 INJECTION, SOLUTION INTRAVENOUS at 08:01

## 2025-01-16 RX ADMIN — DILTIAZEM HYDROCHLORIDE 10 MG: 5 INJECTION INTRAVENOUS at 09:01

## 2025-01-16 RX ADMIN — FERROUS SULFATE TAB 325 MG (65 MG ELEMENTAL FE) 1 EACH: 325 (65 FE) TAB at 12:01

## 2025-01-16 RX ADMIN — FUROSEMIDE 40 MG: 10 INJECTION, SOLUTION INTRAMUSCULAR; INTRAVENOUS at 04:01

## 2025-01-16 RX ADMIN — METOPROLOL TARTRATE 5 MG: 1 INJECTION, SOLUTION INTRAVENOUS at 06:01

## 2025-01-16 RX ADMIN — DOCUSATE SODIUM 100 MG: 100 CAPSULE, LIQUID FILLED ORAL at 12:01

## 2025-01-16 RX ADMIN — FAMOTIDINE 20 MG: 10 INJECTION, SOLUTION INTRAVENOUS at 12:01

## 2025-01-16 RX ADMIN — DIGOXIN 500 MCG: 0.25 INJECTION INTRAMUSCULAR; INTRAVENOUS at 12:01

## 2025-01-16 RX ADMIN — AMIODARONE HYDROCHLORIDE 200 MG: 200 TABLET ORAL at 07:01

## 2025-01-16 NOTE — HPI
78 y/o female with a past medical hx of HFrEF, HTN, Afib, Dyslipidemia seen today in consult for Afib RVR and decompensated CHF. Patient was recently d/c from [facility] after undergoing tx for Afib and CHF. LHC demonstrated NICM EF 25%@ [facility]. Also underwent cardioversion x 2 and converted to NSR however, converted back to Afib. Patient endorses SOB, palpitation worsening last night. ED workup reveals pBnp 17,240. Chest x-ray showed no acute findings other than cardiomegaly and some interstitial prominence. Troponin 28->30.

## 2025-01-16 NOTE — ASSESSMENT & PLAN NOTE
NYHA Class II Stage C - Recent d/c [facility]. LHC demonstrated NICM EF 25%@ [facility].   pBnp 17,240 - agree with IV Lasix  Dobutamine 2.5 mcg/kg/mn  GDMT: Entresto, Jardiance, Lasix, Toprol  Chest x-ray showed no acute findings other than cardiomegaly and some interstitial prominence.  Low sodium diet - I/O - Weights

## 2025-01-16 NOTE — ED TRIAGE NOTES
Patient presents to ed via pov with cc of chest pain and sob. Patient was recently discharged from [facility] where she had a heart cath. Pt was cardioverted twice while at [facility] for afib rvr.

## 2025-01-16 NOTE — ASSESSMENT & PLAN NOTE
Pulmonary hypertension   Pulmonary pressures in the 50s  Probably she will need a sleep study.

## 2025-01-16 NOTE — ASSESSMENT & PLAN NOTE
Patient's blood pressure range in the last 24 hours was: BP  Min: 117/76  Max: 141/84.The patient's inpatient anti-hypertensive regimen is listed below:  Current Antihypertensives  , 2 times daily, Oral    Plan  - BP is controlled, no changes needed to their regimen

## 2025-01-16 NOTE — ASSESSMENT & PLAN NOTE
Atypical chest pain   Rule out MI serial EKGs and cardiac enzymes.    Trend enzymes   Telemetry   Cardiology evaluation appreciated

## 2025-01-16 NOTE — HPI
79-YEAR-OLD FEMALE PATIENT WITH PAST MEDICAL HISTORY OF HEART FAILURE WITH REDUCED EJECTION FRACTION, HYPERTENSION, CHRONIC ATRIAL FIBRILLATION, ASTHMA, DYSLIPIDEMIA AND HISTORY OF MIGRAINES.    PATIENT WAS DISCHARGED FROM [facility] yesterday, treated for AFib RVR, patient underwent cardioversion twice at [facility], converted to normal sinus rhythm.    Underwent left heart catheterization without intervention.    Echocardiogram showed ejection fraction of 25-30%.    Patient presented to the emergency department today with shortness of breaths started earlier this morning with on and off chest pain, currently patient has no chest pain.    Shortness of breath was episodic, no cough, no palpitation, no nausea no vomiting no headache no dizziness.    Blood pressure 140/80 heart rate 128 saturating 96% on room air, she was afebrile in the emergency department.    White blood cell count 16 hemoglobin 12 platelets count 217 INR 1.3 creatinine 1.7 with a BUN of 35 pro BNP 17,240 initial troponin was 28 repeat troponin was 30.    Chest x-ray of 01/15/2025 showed no acute findings other than cardiomegaly and some interstitial prominence.  Admitted to the hospitalist service for further care and management

## 2025-01-16 NOTE — H&P
[facility] - Emergency Department  Hospital Medicine  History & Physical    Patient Name: Flaquita Sosa  MRN: [id number]  Patient Class: OP- Observation  Admission Date: 1/16/2025  Attending Physician: Dejan Acharya MD   Primary Care Provider: Concetta Grday DO         Patient information was obtained from patient and ER records.     Subjective:     Principal Problem:Acute on chronic heart failure    Chief Complaint:   Chief Complaint   Patient presents with    Chest Pain        HPI: 79-YEAR-OLD FEMALE PATIENT WITH PAST MEDICAL HISTORY OF HEART FAILURE WITH REDUCED EJECTION FRACTION, HYPERTENSION, CHRONIC ATRIAL FIBRILLATION, ASTHMA, DYSLIPIDEMIA AND HISTORY OF MIGRAINES.    PATIENT WAS DISCHARGED FROM [facility] yesterday, treated for AFib RVR, patient underwent cardioversion twice at [facility], converted to normal sinus rhythm.    Underwent left heart catheterization without intervention.    Echocardiogram showed ejection fraction of 25-30%.    Patient presented to the emergency department today with shortness of breaths started earlier this morning with on and off chest pain, currently patient has no chest pain.    Shortness of breath was episodic, no cough, no palpitation, no nausea no vomiting no headache no dizziness.    Blood pressure 140/80 heart rate 128 saturating 96% on room air, she was afebrile in the emergency department.    White blood cell count 16 hemoglobin 12 platelets count 217 INR 1.3 creatinine 1.7 with a BUN of 35 pro BNP 17,240 initial troponin was 28 repeat troponin was 30.    Chest x-ray of 01/15/2025 showed no acute findings other than cardiomegaly and some interstitial prominence.  Admitted to the hospitalist service for further care and management     Past Medical History:   Diagnosis Date    Hyperlipidemia     Hypertension        Past Surgical History:   Procedure Laterality Date    HYSTERECTOMY      OOPHORECTOMY         Review of patient's  allergies indicates:   Allergen Reactions    Iodinated contrast media        No current facility-administered medications on file prior to encounter.     Current Outpatient Medications on File Prior to Encounter   Medication Sig    amiodarone (PACERONE) 200 MG Tab Take 200 mg by mouth 2 (two) times daily.    metoprolol succinate (TOPROL-XL) 100 MG 24 hr tablet Take 100 mg by mouth 2 (two) times daily.    azelastine (ASTELIN) 137 mcg (0.1 %) nasal spray 1 spray (137 mcg total) by Nasal route 2 (two) times daily.    carvediloL (COREG) 25 MG tablet Take 25 mg by mouth 2 (two) times daily.    ciprofloxacin HCl (CILOXAN) 0.3 % ophthalmic solution 4 drops to both ears twice a day x 7 days    ELIQUIS 2.5 mg Tab Take 2.5 mg by mouth 2 (two) times daily.    flecainide (TAMBOCOR) 50 MG Tab     furosemide (LASIX) 20 MG tablet Take 40 mg by mouth once daily.    ipratropium (ATROVENT) 21 mcg (0.03 %) nasal spray 2 sprays by Each Nostril route 2 (two) times daily.    JARDIANCE 10 mg tablet Take 10 mg by mouth once daily.    levocetirizine (XYZAL) 5 MG tablet Take 1 tablet (5 mg total) by mouth every evening.    olmesartan-hydrochlorothiazide (BENICAR HCT) 40-12.5 mg Tab     pravastatin (PRAVACHOL) 40 MG tablet Take 40 mg by mouth every evening.    spironolactone (ALDACTONE) 25 MG tablet Take 25 mg by mouth.     Family History    Family history is unknown by patient.       Tobacco Use    Smoking status: Never     Passive exposure: Never    Smokeless tobacco: Never   Substance and Sexual Activity    Alcohol use: Never    Drug use: Never    Sexual activity: Not Currently     Partners: Male     Review of Systems   Constitutional:  Negative for chills and fever.   HENT:  Negative for congestion.    Eyes:  Negative for pain and discharge.   Respiratory:  Positive for chest tightness and shortness of breath. Negative for cough, wheezing and stridor.    Cardiovascular:  Positive for chest pain. Negative for palpitations and leg swelling.    Gastrointestinal:  Negative for abdominal distention, abdominal pain, blood in stool, diarrhea, nausea and vomiting.   Endocrine: Negative for cold intolerance, polydipsia and polyuria.   Genitourinary:  Negative for difficulty urinating, dysuria, frequency, hematuria and urgency.   Musculoskeletal:  Negative for arthralgias, back pain and neck pain.   Neurological:  Negative for dizziness, seizures, syncope, weakness, numbness and headaches.   Hematological:  Negative for adenopathy.   Psychiatric/Behavioral:  Negative for behavioral problems.    All other systems reviewed and are negative.    Objective:     Vital Signs (Most Recent):  Temp: 97.6 °F (36.4 °C) (01/16/25 0556)  Pulse: 101 (01/16/25 1026)  Resp: (!) 21 (01/16/25 1026)  BP: 121/73 (01/16/25 1026)  SpO2: 95 % (01/16/25 1026) Vital Signs (24h Range):  Temp:  [97.6 °F (36.4 °C)] 97.6 °F (36.4 °C)  Pulse:  [] 101  Resp:  [12-29] 21  SpO2:  [93 %-97 %] 95 %  BP: (117-141)/(73-84) 121/73     Weight: 72.6 kg (160 lb)  Body mass index is 24.33 kg/m².     Physical Exam  Vitals reviewed.   HENT:      Nose: Nose normal.      Mouth/Throat:      Pharynx: Oropharynx is clear.   Eyes:      Pupils: Pupils are equal, round, and reactive to light.   Cardiovascular:      Rate and Rhythm: Tachycardia present. Rhythm irregular.      Pulses: Normal pulses.      Heart sounds: Normal heart sounds. No murmur heard.  Pulmonary:      Effort: Pulmonary effort is normal. No respiratory distress.      Breath sounds: Normal breath sounds. No stridor. No wheezing or rales.   Abdominal:      General: Abdomen is flat. Bowel sounds are normal. There is no distension.      Tenderness: There is no abdominal tenderness. There is no rebound.   Musculoskeletal:         General: No swelling or deformity. Normal range of motion.      Cervical back: Normal range of motion and neck supple.   Skin:     General: Skin is warm.      Coloration: Skin is not jaundiced or pale.   Neurological:       General: No focal deficit present.      Mental Status: She is alert and oriented to person, place, and time. Mental status is at baseline.      Cranial Nerves: No cranial nerve deficit.      Sensory: No sensory deficit.      Motor: No weakness.   Psychiatric:         Mood and Affect: Mood normal.         Behavior: Behavior normal.              CRANIAL NERVES     CN III, IV, VI   Pupils are equal, round, and reactive to light.       Significant Labs: All pertinent labs within the past 24 hours have been reviewed.    Significant Imaging: I have reviewed all pertinent imaging results/findings within the past 24 hours.  Assessment/Plan:     * Acute on chronic heart failure    Acute on chronic systolic nonischemic cardiomyopathy.    Heart catheterization January 14, 2025 showed ejection fraction 15-20%, clean cath no stents.  We will continue with IV diuresis   Continue with guideline directed therapy , beta blockers, SGLT2 inhibitors, not tolerating Entresto previously.  Continue to follow with Cardiology recommendations.    Daily weight   Input output charting     Dyslipidemia        HTN (hypertension)  Patient's blood pressure range in the last 24 hours was: BP  Min: 117/76  Max: 141/84.The patient's inpatient anti-hypertensive regimen is listed below:  Current Antihypertensives  , 2 times daily, Oral    Plan  - BP is controlled, no changes needed to their regimen      Pulmonary hypertension  Pulmonary hypertension   Pulmonary pressures in the 50s  Probably she will need a sleep study.        Chest pain      Atypical chest pain   Rule out MI serial EKGs and cardiac enzymes.    Trend enzymes   Telemetry   Cardiology evaluation appreciated       Atrial fibrillation  Patient has persistent (7 days or more) atrial fibrillation. Patient is currently in atrial fibrillation. [id number] Score: 3. The patients heart rate in the last 24 hours is as follows:  Pulse  Min: 97  Max: 128     Antiarrhythmics  , 2 times daily,  Oral  , 2 times daily, Oral    Anticoagulants       Plan  - Replete lytes with a goal of K>4, Mg >2  - Patient is anticoagulated, see medications listed above.  - Patient's afib is currently uncontrolled. Will continue current treatment  - Atrial fibrillation with rapid ventricular response   Patient with chronic AFib, status post electrical cardioversion twice at [facility] last week.    Now patient is back in AFib with RVR.    Cardiology consulted   Continue amiodarone   Try to wean her off Cardizem due to impaired left ventricular ejection fraction, I will start beta blockers, to be addressed by cardiologist.    Continue Eliquis anticoagulation.            VTE Risk Mitigation (From admission, onward)      None               On 01/16/2025, patient should be placed in hospital observation services under my care.             Dejan Acharya MD  Department of Hospital Medicine  [facility] - Emergency Department

## 2025-01-16 NOTE — SUBJECTIVE & OBJECTIVE
Past Medical History:   Diagnosis Date    Hyperlipidemia     Hypertension        Past Surgical History:   Procedure Laterality Date    HYSTERECTOMY      OOPHORECTOMY         Review of patient's allergies indicates:   Allergen Reactions    Iodinated contrast media        No current facility-administered medications on file prior to encounter.     Current Outpatient Medications on File Prior to Encounter   Medication Sig    amiodarone (PACERONE) 200 MG Tab Take 200 mg by mouth 2 (two) times daily.    metoprolol succinate (TOPROL-XL) 100 MG 24 hr tablet Take 100 mg by mouth 2 (two) times daily.    azelastine (ASTELIN) 137 mcg (0.1 %) nasal spray 1 spray (137 mcg total) by Nasal route 2 (two) times daily.    carvediloL (COREG) 25 MG tablet Take 25 mg by mouth 2 (two) times daily.    ciprofloxacin HCl (CILOXAN) 0.3 % ophthalmic solution 4 drops to both ears twice a day x 7 days    ELIQUIS 2.5 mg Tab Take 2.5 mg by mouth 2 (two) times daily.    flecainide (TAMBOCOR) 50 MG Tab     furosemide (LASIX) 20 MG tablet Take 40 mg by mouth once daily.    ipratropium (ATROVENT) 21 mcg (0.03 %) nasal spray 2 sprays by Each Nostril route 2 (two) times daily.    JARDIANCE 10 mg tablet Take 10 mg by mouth once daily.    levocetirizine (XYZAL) 5 MG tablet Take 1 tablet (5 mg total) by mouth every evening.    olmesartan-hydrochlorothiazide (BENICAR HCT) 40-12.5 mg Tab     pravastatin (PRAVACHOL) 40 MG tablet Take 40 mg by mouth every evening.    spironolactone (ALDACTONE) 25 MG tablet Take 25 mg by mouth.     Family History    Family history is unknown by patient.       Tobacco Use    Smoking status: Never     Passive exposure: Never    Smokeless tobacco: Never   Substance and Sexual Activity    Alcohol use: Never    Drug use: Never    Sexual activity: Not Currently     Partners: Male     Review of Systems   Constitutional:  Negative for chills and fever.   HENT:  Negative for congestion.    Eyes:  Negative for pain and discharge.    Respiratory:  Positive for chest tightness and shortness of breath. Negative for cough, wheezing and stridor.    Cardiovascular:  Positive for chest pain. Negative for palpitations and leg swelling.   Gastrointestinal:  Negative for abdominal distention, abdominal pain, blood in stool, diarrhea, nausea and vomiting.   Endocrine: Negative for cold intolerance, polydipsia and polyuria.   Genitourinary:  Negative for difficulty urinating, dysuria, frequency, hematuria and urgency.   Musculoskeletal:  Negative for arthralgias, back pain and neck pain.   Neurological:  Negative for dizziness, seizures, syncope, weakness, numbness and headaches.   Hematological:  Negative for adenopathy.   Psychiatric/Behavioral:  Negative for behavioral problems.    All other systems reviewed and are negative.    Objective:     Vital Signs (Most Recent):  Temp: 97.6 °F (36.4 °C) (01/16/25 0556)  Pulse: 101 (01/16/25 1026)  Resp: (!) 21 (01/16/25 1026)  BP: 121/73 (01/16/25 1026)  SpO2: 95 % (01/16/25 1026) Vital Signs (24h Range):  Temp:  [97.6 °F (36.4 °C)] 97.6 °F (36.4 °C)  Pulse:  [] 101  Resp:  [12-29] 21  SpO2:  [93 %-97 %] 95 %  BP: (117-141)/(73-84) 121/73     Weight: 72.6 kg (160 lb)  Body mass index is 24.33 kg/m².     Physical Exam  Vitals reviewed.   HENT:      Nose: Nose normal.      Mouth/Throat:      Pharynx: Oropharynx is clear.   Eyes:      Pupils: Pupils are equal, round, and reactive to light.   Cardiovascular:      Rate and Rhythm: Tachycardia present. Rhythm irregular.      Pulses: Normal pulses.      Heart sounds: Normal heart sounds. No murmur heard.  Pulmonary:      Effort: Pulmonary effort is normal. No respiratory distress.      Breath sounds: Normal breath sounds. No stridor. No wheezing or rales.   Abdominal:      General: Abdomen is flat. Bowel sounds are normal. There is no distension.      Tenderness: There is no abdominal tenderness. There is no rebound.   Musculoskeletal:         General: No  swelling or deformity. Normal range of motion.      Cervical back: Normal range of motion and neck supple.   Skin:     General: Skin is warm.      Coloration: Skin is not jaundiced or pale.   Neurological:      General: No focal deficit present.      Mental Status: She is alert and oriented to person, place, and time. Mental status is at baseline.      Cranial Nerves: No cranial nerve deficit.      Sensory: No sensory deficit.      Motor: No weakness.   Psychiatric:         Mood and Affect: Mood normal.         Behavior: Behavior normal.              CRANIAL NERVES     CN III, IV, VI   Pupils are equal, round, and reactive to light.       Significant Labs: All pertinent labs within the past 24 hours have been reviewed.    Significant Imaging: I have reviewed all pertinent imaging results/findings within the past 24 hours.

## 2025-01-16 NOTE — ASSESSMENT & PLAN NOTE
Patient has paroxysmal (<7 days) atrial fibrillation. Patient is currently in atrial fibrillation. [id number] Score: 3. The patients heart rate in the last 24 hours is as follows:  Pulse  Min: 97  Max: 128     Antiarrhythmics  , 2 times daily, Oral  , Daily, Oral  amiodarone tablet 200 mg, 2 times daily, Oral  metoprolol succinate (TOPROL-XL) 24 hr tablet 100 mg, Daily, Oral    Anticoagulants  apixaban tablet 2.5 mg, 2 times daily, Oral    Plan  - Replete lytes with a goal of K>4, Mg >2  - Patient is anticoagulated, see medications listed above.  - Patient's afib is currently uncontrolled. Will adjust treatment as follows:    - Amiodarone 200 mg bid, Digoxin 500 mcg once then po 0.125 mcg, toprol  - o/p to Dr. Ulloa EP specialist unless unable to control HR I/p. Will consider transfer

## 2025-01-16 NOTE — SUBJECTIVE & OBJECTIVE
Past Medical History:   Diagnosis Date    Hyperlipidemia     Hypertension        Past Surgical History:   Procedure Laterality Date    HYSTERECTOMY      OOPHORECTOMY         Review of patient's allergies indicates:   Allergen Reactions    Iodinated contrast media        No current facility-administered medications on file prior to encounter.     Current Outpatient Medications on File Prior to Encounter   Medication Sig    amiodarone (PACERONE) 200 MG Tab Take 200 mg by mouth 2 (two) times daily.    azelastine (ASTELIN) 137 mcg (0.1 %) nasal spray 1 spray (137 mcg total) by Nasal route 2 (two) times daily.    carvediloL (COREG) 25 MG tablet Take 25 mg by mouth 2 (two) times daily.    ELIQUIS 2.5 mg Tab Take 2.5 mg by mouth 2 (two) times daily.    flecainide (TAMBOCOR) 50 MG Tab Take 50 mg by mouth every 12 (twelve) hours.    furosemide (LASIX) 40 MG tablet Take 40 mg by mouth once daily.    JARDIANCE 10 mg tablet Take 10 mg by mouth once daily.    metoprolol succinate (TOPROL-XL) 100 MG 24 hr tablet Take 100 mg by mouth once daily.    pravastatin (PRAVACHOL) 40 MG tablet Take 40 mg by mouth every evening.    valACYclovir (VALTREX) 500 MG tablet Take 500 mg by mouth once daily.    olmesartan-hydrochlorothiazide (BENICAR HCT) 40-12.5 mg Tab     spironolactone (ALDACTONE) 25 MG tablet Take 25 mg by mouth.    [DISCONTINUED] ciprofloxacin HCl (CILOXAN) 0.3 % ophthalmic solution 4 drops to both ears twice a day x 7 days    [DISCONTINUED] ipratropium (ATROVENT) 21 mcg (0.03 %) nasal spray 2 sprays by Each Nostril route 2 (two) times daily.    [DISCONTINUED] levocetirizine (XYZAL) 5 MG tablet Take 1 tablet (5 mg total) by mouth every evening.     Family History    Family history is unknown by patient.       Tobacco Use    Smoking status: Never     Passive exposure: Never    Smokeless tobacco: Never   Substance and Sexual Activity    Alcohol use: Never    Drug use: Never    Sexual activity: Not Currently     Partners: Male      Review of Systems   Cardiovascular:  Positive for dyspnea on exertion, irregular heartbeat and palpitations. Negative for chest pain.   Respiratory:  Positive for shortness of breath.    All other systems reviewed and are negative.    Objective:     Vital Signs (Most Recent):  Temp: 97.6 °F (36.4 °C) (01/16/25 0556)  Pulse: (!) 113 (01/16/25 1156)  Resp: (!) 21 (01/16/25 1156)  BP: (!) 105/55 (01/16/25 1156)  SpO2: 96 % (01/16/25 1156) Vital Signs (24h Range):  Temp:  [97.6 °F (36.4 °C)] 97.6 °F (36.4 °C)  Pulse:  [] 113  Resp:  [12-29] 21  SpO2:  [93 %-97 %] 96 %  BP: (105-141)/(55-84) 105/55     Weight: 72.6 kg (160 lb)  Body mass index is 24.33 kg/m².    SpO2: 96 %       No intake or output data in the 24 hours ending 01/16/25 1229    Lines/Drains/Airways       Peripheral Intravenous Line  Duration                  Peripheral IV - Single Lumen 01/16/25 0613 20 G Anterior;Right Forearm <1 day                     Physical Exam  Vitals reviewed.   Constitutional:       General: She is not in acute distress.     Appearance: She is not ill-appearing.   HENT:      Head: Normocephalic and atraumatic.      Mouth/Throat:      Mouth: Mucous membranes are moist.   Eyes:      Extraocular Movements: Extraocular movements intact.   Cardiovascular:      Rate and Rhythm: Tachycardia present. Rhythm irregular.      Pulses: Normal pulses.      Heart sounds: Normal heart sounds.   Pulmonary:      Effort: Pulmonary effort is normal.   Abdominal:      General: Bowel sounds are normal.      Palpations: Abdomen is soft.   Musculoskeletal:         General: No swelling. Normal range of motion.      Cervical back: Normal range of motion.   Skin:     General: Skin is warm and dry.      Capillary Refill: Capillary refill takes less than 2 seconds.   Neurological:      General: No focal deficit present.      Mental Status: She is alert and oriented to person, place, and time.      Cranial Nerves: No cranial nerve deficit.       Sensory: No sensory deficit.   Psychiatric:         Mood and Affect: Mood normal.          Significant Labs: All pertinent lab results from the last 24 hours have been reviewed.    Significant Imaging: Echocardiogram: Transthoracic echo (TTE) complete (Cupid Only):   Results for orders placed or performed during the hospital encounter of 01/16/25   Echo   Result Value Ref Range    BSA 1.87 m2    A4C EF 20 %    LVOT stroke volume 56.1 cm3    LVIDd 5.2 3.5 - 6.0 cm    LV Systolic Volume 96.26 mL    LV Systolic Volume Index 51.8 mL/m2    LVIDs 4.6 (A) 2.1 - 4.0 cm    LV Diastolic Volume 129.11 mL    LV ESV A4C 69.24 mL    LV Diastolic Volume Index 69.41 mL/m2    LV EDV A4C 67.93 mL    Left Ventricular End Systolic Volume by Teichholz Method 96.26 mL    Left Ventricular End Diastolic Volume by Teichholz Method 129.11 mL    IVS 1.6 (A) 0.6 - 1.1 cm    LVOT diameter 2.3 cm    LVOT area 4.2 cm2    FS 11.5 (A) 28 - 44 %    Left Ventricle Relative Wall Thickness 0.50 cm    PW 1.3 (A) 0.6 - 1.1 cm    LV mass 325.8 g    LV Mass Index 175.1 g/m2    TDI LATERAL 0.14 m/s    TDI SEPTAL 0.03 m/s    TR Max Hussein 3.4 m/s    LVOT peak hussein 0.7 m/s    Left Ventricular Outflow Tract Mean Velocity 0.52 cm/s    Left Ventricular Outflow Tract Mean Gradient 1.17 mmHg    RV- martinez basal diam 4.8 cm    RV-martinez mid d 4.4 cm    RV Basal Diameter 5.85 cm    RV-martinez length 5.9 cm    RV mid diameter 4.40 cm    TAPSE 1.22 cm    RV/LV Ratio 0.92 cm    LA size 4.6 cm    RA Major Axis 4.99 cm    AV regurgitation pressure 1/2 time 395 ms    AR Max Hussein 4.19 m/s    AV mean gradient 3 mmHg    AV peak gradient 7 mmHg    Ao peak hussein 1.3 m/s    Ao VTI 16.0 cm    LVOT peak VTI 13.5 cm    AV valve area 3.5 cm²    AV Velocity Ratio 0.54     AV index (prosthetic) 0.84     JAMIE by Velocity Ratio 2.2 cm²    Mr max hussein 4.83 m/s    Triscuspid Valve Regurgitation Peak Gradient 46 mmHg    PV PEAK VELOCITY 0.99 m/s    PV peak gradient 4 mmHg    Ao root annulus 2.63 cm     IVC diameter 2.59 cm    Mean e' 0.09 m/s    ZLVIDS 2.89     ZLVIDD 0.05     LA area A4C 23.62 cm2    AORTIC VALVE CUSP SEPERATION 2.30 cm    TV resting pulmonary artery pressure 61 mmHg    RV TB RVSP 18 mmHg    Est. RA pres 15 mmHg    [place]'s Biplane MOD Ejection Fraction 17 %    Narrative      Left Ventricle: The left ventricle is moderately dilated. Mildly   increased wall thickness. There is concentric hypertrophy. Septal motion   is consistent with bundle branch block. There is severely reduced systolic   function with a visually estimated ejection fraction of 15 - 20%.   Quantitated ejection fraction is 17%. Unable to assess diastolic function   due to atrial fibrillation.    Right Ventricle: Moderate right ventricular enlargement. Systolic   function is moderately reduced.    Left Atrium: Left atrium is severely dilated.    Right Atrium: Right atrium is severely dilated.    Aortic Valve: The aortic valve is a trileaflet valve. Mildly calcified   cusps. There is mild aortic regurgitation with a centrally directed jet.    Mitral Valve: There is mild bileaflet sclerosis. Mildly thickened   leaflets. There is moderate to severe regurgitation with a centrally   directed jet.    Tricuspid Valve: There is mild to moderate regurgitation with an   eccentrically directed jet.    Pulmonic Valve: There is mild regurgitation.    Pulmonary Artery: The estimated pulmonary artery systolic pressure is   61 mmHg.    IVC/SVC: Elevated venous pressure at 15 mmHg.    Pericardium: There is a small effusion.

## 2025-01-16 NOTE — ASSESSMENT & PLAN NOTE
Patient has persistent (7 days or more) atrial fibrillation. Patient is currently in atrial fibrillation. [id number] Score: 3. The patients heart rate in the last 24 hours is as follows:  Pulse  Min: 97  Max: 128     Antiarrhythmics  , 2 times daily, Oral  , 2 times daily, Oral    Anticoagulants       Plan  - Replete lytes with a goal of K>4, Mg >2  - Patient is anticoagulated, see medications listed above.  - Patient's afib is currently uncontrolled. Will continue current treatment  - Atrial fibrillation with rapid ventricular response   Patient with chronic AFib, status post electrical cardioversion twice at [facility] last week.    Now patient is back in AFib with RVR.    Cardiology consulted   Continue amiodarone   Try to wean her off Cardizem due to impaired left ventricular ejection fraction, I will start beta blockers, to be addressed by cardiologist.    Continue Eliquis anticoagulation.

## 2025-01-16 NOTE — CONSULTS
[facility] - Emergency Department  Cardiology  Consult Note    Patient Name: Flaquita Sosa  MRN: [id number]  Admission Date: 1/16/2025  Hospital Length of Stay: 0 days  Code Status: Full Code   Attending Provider: Dejan Acharya MD   Consulting Provider: ALIYA South  Primary Care Physician: Concetta Grady DO  Principal Problem:Acute on chronic heart failure    Patient information was obtained from patient, past medical records, and ER records.     Inpatient consult to Cardiology  Consult performed by: Naresh Belcher ACNP  Consult ordered by: Dejan Acharya MD  Reason for consult: CHF exacerbation and AFib        Subjective:     Chief Complaint:  SOB     HPI:   80 y/o female with a past medical hx of HFrEF, HTN, Afib, Dyslipidemia seen today in consult for Afib RVR and decompensated CHF. Patient was recently d/c from [facility] after undergoing tx for Afib and CHF. LHC demonstrated NICM EF 25%@ [facility]. Also underwent cardioversion x 2 and converted to NSR however, converted back to Afib. Patient endorses SOB, palpitation worsening last night. ED workup reveals pBnp 17,240. Chest x-ray showed no acute findings other than cardiomegaly and some interstitial prominence. Troponin 28->30.           Past Medical History:   Diagnosis Date    Hyperlipidemia     Hypertension        Past Surgical History:   Procedure Laterality Date    HYSTERECTOMY      OOPHORECTOMY         Review of patient's allergies indicates:   Allergen Reactions    Iodinated contrast media        No current facility-administered medications on file prior to encounter.     Current Outpatient Medications on File Prior to Encounter   Medication Sig    amiodarone (PACERONE) 200 MG Tab Take 200 mg by mouth 2 (two) times daily.    azelastine (ASTELIN) 137 mcg (0.1 %) nasal spray 1 spray (137 mcg total) by Nasal route 2 (two) times daily.    carvediloL (COREG) 25 MG tablet Take 25 mg by mouth 2 (two) times daily.     ELIQUIS 2.5 mg Tab Take 2.5 mg by mouth 2 (two) times daily.    flecainide (TAMBOCOR) 50 MG Tab Take 50 mg by mouth every 12 (twelve) hours.    furosemide (LASIX) 40 MG tablet Take 40 mg by mouth once daily.    JARDIANCE 10 mg tablet Take 10 mg by mouth once daily.    metoprolol succinate (TOPROL-XL) 100 MG 24 hr tablet Take 100 mg by mouth once daily.    pravastatin (PRAVACHOL) 40 MG tablet Take 40 mg by mouth every evening.    valACYclovir (VALTREX) 500 MG tablet Take 500 mg by mouth once daily.    olmesartan-hydrochlorothiazide (BENICAR HCT) 40-12.5 mg Tab     spironolactone (ALDACTONE) 25 MG tablet Take 25 mg by mouth.    [DISCONTINUED] ciprofloxacin HCl (CILOXAN) 0.3 % ophthalmic solution 4 drops to both ears twice a day x 7 days    [DISCONTINUED] ipratropium (ATROVENT) 21 mcg (0.03 %) nasal spray 2 sprays by Each Nostril route 2 (two) times daily.    [DISCONTINUED] levocetirizine (XYZAL) 5 MG tablet Take 1 tablet (5 mg total) by mouth every evening.     Family History    Family history is unknown by patient.       Tobacco Use    Smoking status: Never     Passive exposure: Never    Smokeless tobacco: Never   Substance and Sexual Activity    Alcohol use: Never    Drug use: Never    Sexual activity: Not Currently     Partners: Male     Review of Systems   Cardiovascular:  Positive for dyspnea on exertion, irregular heartbeat and palpitations. Negative for chest pain.   Respiratory:  Positive for shortness of breath.    All other systems reviewed and are negative.    Objective:     Vital Signs (Most Recent):  Temp: 97.6 °F (36.4 °C) (01/16/25 0556)  Pulse: (!) 113 (01/16/25 1156)  Resp: (!) 21 (01/16/25 1156)  BP: (!) 105/55 (01/16/25 1156)  SpO2: 96 % (01/16/25 1156) Vital Signs (24h Range):  Temp:  [97.6 °F (36.4 °C)] 97.6 °F (36.4 °C)  Pulse:  [] 113  Resp:  [12-29] 21  SpO2:  [93 %-97 %] 96 %  BP: (105-141)/(55-84) 105/55     Weight: 72.6 kg (160 lb)  Body mass index is 24.33 kg/m².    SpO2: 96 %        No intake or output data in the 24 hours ending 01/16/25 1229    Lines/Drains/Airways       Peripheral Intravenous Line  Duration                  Peripheral IV - Single Lumen 01/16/25 0613 20 G Anterior;Right Forearm <1 day                     Physical Exam  Vitals reviewed.   Constitutional:       General: She is not in acute distress.     Appearance: She is not ill-appearing.   HENT:      Head: Normocephalic and atraumatic.      Mouth/Throat:      Mouth: Mucous membranes are moist.   Eyes:      Extraocular Movements: Extraocular movements intact.   Cardiovascular:      Rate and Rhythm: Tachycardia present. Rhythm irregular.      Pulses: Normal pulses.      Heart sounds: Normal heart sounds.   Pulmonary:      Effort: Pulmonary effort is normal.   Abdominal:      General: Bowel sounds are normal.      Palpations: Abdomen is soft.   Musculoskeletal:         General: No swelling. Normal range of motion.      Cervical back: Normal range of motion.   Skin:     General: Skin is warm and dry.      Capillary Refill: Capillary refill takes less than 2 seconds.   Neurological:      General: No focal deficit present.      Mental Status: She is alert and oriented to person, place, and time.      Cranial Nerves: No cranial nerve deficit.      Sensory: No sensory deficit.   Psychiatric:         Mood and Affect: Mood normal.          Significant Labs: All pertinent lab results from the last 24 hours have been reviewed.    Significant Imaging: Echocardiogram: Transthoracic echo (TTE) complete (Cupid Only):   Results for orders placed or performed during the hospital encounter of 01/16/25   Echo   Result Value Ref Range    BSA 1.87 m2    A4C EF 20 %    LVOT stroke volume 56.1 cm3    LVIDd 5.2 3.5 - 6.0 cm    LV Systolic Volume 96.26 mL    LV Systolic Volume Index 51.8 mL/m2    LVIDs 4.6 (A) 2.1 - 4.0 cm    LV Diastolic Volume 129.11 mL    LV ESV A4C 69.24 mL    LV Diastolic Volume Index 69.41 mL/m2    LV EDV A4C 67.93 mL     Left Ventricular End Systolic Volume by Teichholz Method 96.26 mL    Left Ventricular End Diastolic Volume by Teichholz Method 129.11 mL    IVS 1.6 (A) 0.6 - 1.1 cm    LVOT diameter 2.3 cm    LVOT area 4.2 cm2    FS 11.5 (A) 28 - 44 %    Left Ventricle Relative Wall Thickness 0.50 cm    PW 1.3 (A) 0.6 - 1.1 cm    LV mass 325.8 g    LV Mass Index 175.1 g/m2    TDI LATERAL 0.14 m/s    TDI SEPTAL 0.03 m/s    TR Max Hussein 3.4 m/s    LVOT peak hussein 0.7 m/s    Left Ventricular Outflow Tract Mean Velocity 0.52 cm/s    Left Ventricular Outflow Tract Mean Gradient 1.17 mmHg    RV- martinez basal diam 4.8 cm    RV-martinez mid d 4.4 cm    RV Basal Diameter 5.85 cm    RV-martinez length 5.9 cm    RV mid diameter 4.40 cm    TAPSE 1.22 cm    RV/LV Ratio 0.92 cm    LA size 4.6 cm    RA Major Axis 4.99 cm    AV regurgitation pressure 1/2 time 395 ms    AR Max Hussein 4.19 m/s    AV mean gradient 3 mmHg    AV peak gradient 7 mmHg    Ao peak hussein 1.3 m/s    Ao VTI 16.0 cm    LVOT peak VTI 13.5 cm    AV valve area 3.5 cm²    AV Velocity Ratio 0.54     AV index (prosthetic) 0.84     JAMIE by Velocity Ratio 2.2 cm²    Mr max hussein 4.83 m/s    Triscuspid Valve Regurgitation Peak Gradient 46 mmHg    PV PEAK VELOCITY 0.99 m/s    PV peak gradient 4 mmHg    Ao root annulus 2.63 cm    IVC diameter 2.59 cm    Mean e' 0.09 m/s    ZLVIDS 2.89     ZLVIDD 0.05     LA area A4C 23.62 cm2    AORTIC VALVE CUSP SEPERATION 2.30 cm    TV resting pulmonary artery pressure 61 mmHg    RV TB RVSP 18 mmHg    Est. RA pres 15 mmHg    [place]'s Biplane MOD Ejection Fraction 17 %    Narrative      Left Ventricle: The left ventricle is moderately dilated. Mildly   increased wall thickness. There is concentric hypertrophy. Septal motion   is consistent with bundle branch block. There is severely reduced systolic   function with a visually estimated ejection fraction of 15 - 20%.   Quantitated ejection fraction is 17%. Unable to assess diastolic function   due to atrial fibrillation.     Right Ventricle: Moderate right ventricular enlargement. Systolic   function is moderately reduced.    Left Atrium: Left atrium is severely dilated.    Right Atrium: Right atrium is severely dilated.    Aortic Valve: The aortic valve is a trileaflet valve. Mildly calcified   cusps. There is mild aortic regurgitation with a centrally directed jet.    Mitral Valve: There is mild bileaflet sclerosis. Mildly thickened   leaflets. There is moderate to severe regurgitation with a centrally   directed jet.    Tricuspid Valve: There is mild to moderate regurgitation with an   eccentrically directed jet.    Pulmonic Valve: There is mild regurgitation.    Pulmonary Artery: The estimated pulmonary artery systolic pressure is   61 mmHg.    IVC/SVC: Elevated venous pressure at 15 mmHg.    Pericardium: There is a small effusion.       Assessment and Plan:     * Acute on chronic heart failure  NYHA Class II Stage C - Recent d/c [facility]. LHC demonstrated NICM EF 25%@ [facility].   pBnp 17,240 - agree with IV Lasix  Dobutamine 2.5 mcg/kg/mn  GDMT: Entresto, Jardiance, Lasix, Toprol  Chest x-ray showed no acute findings other than cardiomegaly and some interstitial prominence.  Low sodium diet - I/O - Weights       Atrial fibrillation  Patient has paroxysmal (<7 days) atrial fibrillation. Patient is currently in atrial fibrillation. [id number] Score: 3. The patients heart rate in the last 24 hours is as follows:  Pulse  Min: 97  Max: 128     Antiarrhythmics  , 2 times daily, Oral  , Daily, Oral  amiodarone tablet 200 mg, 2 times daily, Oral  metoprolol succinate (TOPROL-XL) 24 hr tablet 100 mg, Daily, Oral    Anticoagulants  apixaban tablet 2.5 mg, 2 times daily, Oral    Plan  - Replete lytes with a goal of K>4, Mg >2  - Patient is anticoagulated, see medications listed above.  - Patient's afib is currently uncontrolled. Will adjust treatment as follows:    - Amiodarone 200 mg bid, Digoxin 500 mcg once then po 0.125 mcg,  toprol  - o/p to Dr. Ulloa EP specialist unless unable to control HR I/p. Will consider transfer            VTE Risk Mitigation (From admission, onward)           Ordered     apixaban tablet 2.5 mg  2 times daily         01/16/25 1200     IP VTE HIGH RISK PATIENT  Once         01/16/25 1200     Place sequential compression device  Until discontinued         01/16/25 1200     Reason for No Pharmacological VTE Prophylaxis  Once        Question:  Reasons:  Answer:  Already adequately anticoagulated on oral Anticoagulants    01/16/25 1200                    Thank you for your consult. I will follow-up with patient. Please contact us if you have any additional questions.    Naresh Belcher, JOSEP  Cardiology   [facility] - Emergency Department

## 2025-01-16 NOTE — PHARMACY MED REC
"Admission Medication History     The home medication history was taken by Cielo Pérez.    You may go to "Admission" then "Reconcile Home Medications" tabs to review and/or act upon these items.     The home medication list has been updated by the Pharmacy department.   Please read ALL comments highlighted in yellow.   Please address this information as you see fit.    Feel free to contact us if you have any questions or require assistance.    Patient had medications in the room with her and she stated before she was hospitalized she was taking olmesartan 5 mg and not the olmesartan/hctz 40-12.5 mg        The medications listed below were removed from the home medication list. Please reorder if appropriate:  Patient reports no longer taking the following medication(s):  Carvedilol 25 mg  Ciprofloxacin 0.3% ophth soln  Ipratropium 21 mcg 0.03% nasal spray  Levocetirizine 5 mg  Valacyclovir 500 mg    Medications listed below were obtained from: Patient/family, Medications brought from home, Analytic software- [email or web address], and Medical records  (Not in a hospital admission)        Current Outpatient Medications on File Prior to Encounter   Medication Sig Dispense Refill Last Dose/Taking    amiodarone (PACERONE) 200 MG Tab Take 200 mg by mouth 2 (two) times daily.   Taking    azelastine (ASTELIN) 137 mcg (0.1 %) nasal spray 1 spray (137 mcg total) by Nasal route 2 (two) times daily. 30 mL 0 Taking    ELIQUIS 2.5 mg Tab Take 2.5 mg by mouth 2 (two) times daily.   Taking    flecainide (TAMBOCOR) 50 MG Tab Take 50 mg by mouth every 12 (twelve) hours.   Taking    furosemide (LASIX) 40 MG tablet Take 40 mg by mouth once daily.   Taking    JARDIANCE 10 mg tablet Take 10 mg by mouth once daily.   Taking    metoprolol succinate (TOPROL-XL) 100 MG 24 hr tablet Take 100 mg by mouth once daily.   Taking    olmesartan (BENICAR) 5 MG Tab Take 5 mg by mouth once daily.   Taking    pravastatin (PRAVACHOL) 40 MG tablet Take 40 mg by mouth " every evening.   Taking    spironolactone (ALDACTONE) 25 MG tablet Take 25 mg by mouth 2 (two) times daily.   Taking    olmesartan-hydrochlorothiazide (BENICAR HCT) 40-12.5 mg Tab    Unknown    [DISCONTINUED] carvediloL (COREG) 25 MG tablet Take 25 mg by mouth 2 (two) times daily.       [DISCONTINUED] ciprofloxacin HCl (CILOXAN) 0.3 % ophthalmic solution 4 drops to both ears twice a day x 7 days 5 mL 0     [DISCONTINUED] ipratropium (ATROVENT) 21 mcg (0.03 %) nasal spray 2 sprays by Each Nostril route 2 (two) times daily. 30 mL 0     [DISCONTINUED] levocetirizine (XYZAL) 5 MG tablet Take 1 tablet (5 mg total) by mouth every evening. 30 tablet 0     [DISCONTINUED] valACYclovir (VALTREX) 500 MG tablet Take 500 mg by mouth once daily.          Potential issues to be addressed PRIOR TO DISCHARGE  N/A    Cielo Pérez  EXT [id number]                 .

## 2025-01-16 NOTE — ASSESSMENT & PLAN NOTE
Acute on chronic systolic nonischemic cardiomyopathy.    Heart catheterization January 14, 2025 showed ejection fraction 15-20%, clean cath no stents.  We will continue with IV diuresis   Continue with guideline directed therapy , beta blockers, SGLT2 inhibitors, not tolerating Entresto previously.  Continue to follow with Cardiology recommendations.    Daily weight   Input output charting

## 2025-01-17 LAB
ALBUMIN PE, BLOOD: 4 G/DL (ref 3.5–5.2)
ALPHA1 GLOB SERPL ELPH-MCNC: 0.2 G/DL (ref 0.1–0.4)
ALPHA2 GLOB SERPL ELPH-MCNC: 0.7 G/DL (ref 0.4–1.3)
ANION GAP SERPL CALCULATED.3IONS-SCNC: 17 MMOL/L (ref 7–16)
B-GLOBULIN SERPL ELPH-MCNC: 0.7 G/DL (ref 0.5–1.5)
BUN SERPL-MCNC: 24 MG/DL (ref 10–20)
BUN/CREAT SERPL: 15 (ref 6–20)
CALCIUM SERPL-MCNC: 9.2 MG/DL (ref 8.4–10.2)
CHLORIDE SERPL-SCNC: 104 MMOL/L (ref 98–107)
CO2 SERPL-SCNC: 27 MMOL/L (ref 23–31)
CREAT SERPL-MCNC: 1.58 MG/DL (ref 0.55–1.02)
EGFR (NO RACE VARIABLE) (RUSH/TITUS): 33 ML/MIN/1.73M2
GAMMA GLOB SERPL ELPH-MCNC: 0.6 G/DL (ref 0.5–1.8)
GLUCOSE SERPL-MCNC: 88 MG/DL (ref 82–115)
PATH INTERP BLD-IMP: NORMAL
POTASSIUM SERPL-SCNC: 3.7 MMOL/L (ref 3.5–5.1)
PROT SERPL-MCNC: 6.1 G/DL (ref 5.8–7.6)
SODIUM SERPL-SCNC: 144 MMOL/L (ref 136–145)

## 2025-01-17 PROCEDURE — G0378 HOSPITAL OBSERVATION PER HR: HCPCS

## 2025-01-17 PROCEDURE — 25000003 PHARM REV CODE 250: Performed by: INTERNAL MEDICINE

## 2025-01-17 PROCEDURE — 63600175 PHARM REV CODE 636 W HCPCS: Performed by: STUDENT IN AN ORGANIZED HEALTH CARE EDUCATION/TRAINING PROGRAM

## 2025-01-17 PROCEDURE — 63600175 PHARM REV CODE 636 W HCPCS: Performed by: INTERNAL MEDICINE

## 2025-01-17 PROCEDURE — 25000003 PHARM REV CODE 250: Performed by: STUDENT IN AN ORGANIZED HEALTH CARE EDUCATION/TRAINING PROGRAM

## 2025-01-17 PROCEDURE — 36415 COLL VENOUS BLD VENIPUNCTURE: CPT | Performed by: STUDENT IN AN ORGANIZED HEALTH CARE EDUCATION/TRAINING PROGRAM

## 2025-01-17 PROCEDURE — 80048 BASIC METABOLIC PNL TOTAL CA: CPT | Performed by: STUDENT IN AN ORGANIZED HEALTH CARE EDUCATION/TRAINING PROGRAM

## 2025-01-17 PROCEDURE — 11000001 HC ACUTE MED/SURG PRIVATE ROOM

## 2025-01-17 PROCEDURE — 99233 SBSQ HOSP IP/OBS HIGH 50: CPT | Mod: ,,, | Performed by: REGISTERED NURSE

## 2025-01-17 RX ORDER — AMIODARONE HYDROCHLORIDE 200 MG/1
200 TABLET ORAL DAILY
Status: DISCONTINUED | OUTPATIENT
Start: 2025-01-18 | End: 2025-01-18 | Stop reason: HOSPADM

## 2025-01-17 RX ORDER — FAMOTIDINE 10 MG/ML
20 INJECTION INTRAVENOUS DAILY
Status: DISCONTINUED | OUTPATIENT
Start: 2025-01-18 | End: 2025-01-18

## 2025-01-17 RX ADMIN — APIXABAN 2.5 MG: 2.5 TABLET, FILM COATED ORAL at 09:01

## 2025-01-17 RX ADMIN — ONDANSETRON 4 MG: 2 INJECTION INTRAMUSCULAR; INTRAVENOUS at 03:01

## 2025-01-17 RX ADMIN — DIGOXIN 0.12 MG: 125 TABLET ORAL at 09:01

## 2025-01-17 RX ADMIN — APIXABAN 2.5 MG: 2.5 TABLET, FILM COATED ORAL at 08:01

## 2025-01-17 RX ADMIN — PRAVASTATIN SODIUM 40 MG: 40 TABLET ORAL at 08:01

## 2025-01-17 RX ADMIN — ACETAMINOPHEN 650 MG: 325 TABLET ORAL at 07:01

## 2025-01-17 RX ADMIN — FUROSEMIDE 40 MG: 10 INJECTION, SOLUTION INTRAMUSCULAR; INTRAVENOUS at 06:01

## 2025-01-17 RX ADMIN — METOPROLOL SUCCINATE 50 MG: 25 TABLET, EXTENDED RELEASE ORAL at 09:01

## 2025-01-17 RX ADMIN — DOCUSATE SODIUM 100 MG: 100 CAPSULE, LIQUID FILLED ORAL at 08:01

## 2025-01-17 RX ADMIN — FERROUS SULFATE TAB 325 MG (65 MG ELEMENTAL FE) 1 EACH: 325 (65 FE) TAB at 09:01

## 2025-01-17 RX ADMIN — FUROSEMIDE 40 MG: 10 INJECTION, SOLUTION INTRAMUSCULAR; INTRAVENOUS at 03:01

## 2025-01-17 RX ADMIN — DOCUSATE SODIUM 100 MG: 100 CAPSULE, LIQUID FILLED ORAL at 09:01

## 2025-01-17 RX ADMIN — FAMOTIDINE 20 MG: 10 INJECTION, SOLUTION INTRAVENOUS at 09:01

## 2025-01-17 RX ADMIN — SACUBITRIL AND VALSARTAN 1 TABLET: 24; 26 TABLET, FILM COATED ORAL at 08:01

## 2025-01-17 RX ADMIN — SACUBITRIL AND VALSARTAN 1 TABLET: 24; 26 TABLET, FILM COATED ORAL at 09:01

## 2025-01-17 RX ADMIN — EMPAGLIFLOZIN 10 MG: 10 TABLET, FILM COATED ORAL at 09:01

## 2025-01-17 RX ADMIN — AMIODARONE HYDROCHLORIDE 200 MG: 200 TABLET ORAL at 09:01

## 2025-01-17 NOTE — PROGRESS NOTES
[facility] - [facility] Telemetry  Cardiology  Progress Note    Patient Name: Flaquita Sosa  MRN: [id number]  Admission Date: 1/16/2025  Hospital Length of Stay: 0 days  Code Status: Full Code   Attending Physician: Dejan Acharya MD   Primary Care Physician: Concetta Grady,   Expected Discharge Date:   Principal Problem:Acute on chronic heart failure    Subjective:     Hospital Course:   No notes on file    Interval History: Conversion to SR overnight . Reports feeling improved today.     Review of Systems   Cardiovascular:  Negative for chest pain, dyspnea on exertion, irregular heartbeat and palpitations.   Respiratory:  Negative for shortness of breath.    All other systems reviewed and are negative.    Objective:     Vital Signs (Most Recent):  Temp: 97.6 °F (36.4 °C) (01/17/25 1309)  Pulse: (!) 57 (01/17/25 1309)  Resp: 15 (01/17/25 0414)  BP: 118/63 (01/17/25 1309)  SpO2: 97 % (01/17/25 1309) Vital Signs (24h Range):  Temp:  [97.5 °F (36.4 °C)-98 °F (36.7 °C)] 97.6 °F (36.4 °C)  Pulse:  [] 57  Resp:  [15-22] 15  SpO2:  [93 %-99 %] 97 %  BP: ()/(47-70) 118/63     Weight: 71.2 kg (156 lb 15.5 oz)  Body mass index is 23.87 kg/m².     SpO2: 97 %         Intake/Output Summary (Last 24 hours) at 1/17/2025 1344  Last data filed at 1/17/2025 0917  Gross per 24 hour   Intake --   Output 1700 ml   Net -1700 ml       Lines/Drains/Airways       Peripheral Intravenous Line  Duration                  Peripheral IV - Single Lumen 01/16/25 0613 20 G Anterior;Right Forearm 1 day                       Physical Exam  Vitals reviewed.   Constitutional:       General: She is not in acute distress.     Appearance: She is not ill-appearing.   HENT:      Head: Normocephalic and atraumatic.      Mouth/Throat:      Mouth: Mucous membranes are moist.   Eyes:      Extraocular Movements: Extraocular movements intact.   Cardiovascular:      Rate and Rhythm: Normal rate and regular rhythm.       Pulses: Normal pulses.      Heart sounds: Normal heart sounds.   Pulmonary:      Effort: Pulmonary effort is normal.   Abdominal:      General: Bowel sounds are normal.      Palpations: Abdomen is soft.   Musculoskeletal:         General: No swelling. Normal range of motion.      Cervical back: Normal range of motion.   Skin:     General: Skin is warm and dry.      Capillary Refill: Capillary refill takes less than 2 seconds.   Neurological:      General: No focal deficit present.      Mental Status: She is alert and oriented to person, place, and time.      Cranial Nerves: No cranial nerve deficit.      Sensory: No sensory deficit.   Psychiatric:         Mood and Affect: Mood normal.            Significant Labs: All pertinent lab results from the last 24 hours have been reviewed.    Significant Imaging: Echocardiogram: Transthoracic echo (TTE) complete (Cupid Only):   Results for orders placed or performed during the hospital encounter of 01/16/25   Echo   Result Value Ref Range    BSA 1.87 m2    A4C EF 20 %    LVOT stroke volume 56.1 cm3    LVIDd 5.2 3.5 - 6.0 cm    LV Systolic Volume 96.26 mL    LV Systolic Volume Index 51.8 mL/m2    LVIDs 4.6 (A) 2.1 - 4.0 cm    LV Diastolic Volume 129.11 mL    LV ESV A4C 69.24 mL    LV Diastolic Volume Index 69.41 mL/m2    LV EDV A4C 67.93 mL    Left Ventricular End Systolic Volume by Teichholz Method 96.26 mL    Left Ventricular End Diastolic Volume by Teichholz Method 129.11 mL    IVS 1.6 (A) 0.6 - 1.1 cm    LVOT diameter 2.3 cm    LVOT area 4.2 cm2    FS 11.5 (A) 28 - 44 %    Left Ventricle Relative Wall Thickness 0.50 cm    PW 1.3 (A) 0.6 - 1.1 cm    LV mass 325.8 g    LV Mass Index 175.1 g/m2    TDI LATERAL 0.14 m/s    TDI SEPTAL 0.03 m/s    TR Max Hussein 3.4 m/s    LVOT peak hussein 0.7 m/s    Left Ventricular Outflow Tract Mean Velocity 0.52 cm/s    Left Ventricular Outflow Tract Mean Gradient 1.17 mmHg    RV- martinez basal diam 4.8 cm    RV-martinez mid d 4.4 cm    RV Basal Diameter  5.85 cm    RV-martinez length 5.9 cm    RV mid diameter 4.40 cm    TAPSE 1.22 cm    RV/LV Ratio 0.92 cm    LA size 4.6 cm    RA Major Axis 4.99 cm    AV regurgitation pressure 1/2 time 395 ms    AR Max Hussein 4.19 m/s    AV mean gradient 3 mmHg    AV peak gradient 7 mmHg    Ao peak hussein 1.3 m/s    Ao VTI 16.0 cm    LVOT peak VTI 13.5 cm    AV valve area 3.5 cm²    AV Velocity Ratio 0.54     AV index (prosthetic) 0.84     JAMIE by Velocity Ratio 2.2 cm²    Mr max hussein 4.83 m/s    Triscuspid Valve Regurgitation Peak Gradient 46 mmHg    PV PEAK VELOCITY 0.99 m/s    PV peak gradient 4 mmHg    Ao root annulus 2.63 cm    IVC diameter 2.59 cm    Mean e' 0.09 m/s    ZLVIDS 2.89     ZLVIDD 0.05     LA area A4C 23.62 cm2    AORTIC VALVE CUSP SEPERATION 2.30 cm    TV resting pulmonary artery pressure 61 mmHg    RV TB RVSP 18 mmHg    Est. RA pres 15 mmHg    [place]'s Biplane MOD Ejection Fraction 17 %    Narrative      Left Ventricle: The left ventricle is moderately dilated. Mildly   increased wall thickness. There is concentric hypertrophy. Septal motion   is consistent with bundle branch block. There is severely reduced systolic   function with a visually estimated ejection fraction of 15 - 20%.   Quantitated ejection fraction is 17%. Unable to assess diastolic function   due to atrial fibrillation.    Right Ventricle: Moderate right ventricular enlargement. Systolic   function is moderately reduced.    Left Atrium: Left atrium is severely dilated.    Right Atrium: Right atrium is severely dilated.    Aortic Valve: The aortic valve is a trileaflet valve. Mildly calcified   cusps. There is mild aortic regurgitation with a centrally directed jet.    Mitral Valve: There is mild bileaflet sclerosis. Mildly thickened   leaflets. There is moderate to severe regurgitation with a centrally   directed jet.    Tricuspid Valve: There is mild to moderate regurgitation with an   eccentrically directed jet.    Pulmonic Valve: There is mild  regurgitation.    Pulmonary Artery: The estimated pulmonary artery systolic pressure is   61 mmHg.    IVC/SVC: Elevated venous pressure at 15 mmHg.    Pericardium: There is a small effusion.       Assessment and Plan:     Brief HPI: 78 y/o female with a past medical hx of HFrEF, HTN, Afib, Dyslipidemia seen today in consult for Afib RVR and decompensated CHF. Patient was recently d/c from [facility] after undergoing tx for Afib and CHF. LHC demonstrated NICM EF 25%@ [facility]. Also underwent cardioversion x 2 and converted to NSR however, converted back to Afib. Patient endorses SOB, palpitation worsening last night. ED workup reveals pBnp 17,240. Chest x-ray showed no acute findings other than cardiomegaly and some interstitial prominence. Troponin 28->30.     * Acute on chronic heart failure  NYHA Class II Stage C - Recent d/c [facility]. LHC demonstrated NICM EF 25%@ [facility].   pBnp 17,240 - agree with IV Lasix  Dopamine 2.5 mcg/kg/mn  GDMT: Entresto, Jardiance, Lasix, Toprol  Chest x-ray showed no acute findings other than cardiomegaly and some interstitial prominence.  Low sodium diet - I/O - Weights     1/17/25  Improvement in SOB  Generous urine output  Continue Dopamine 1 more day      Atrial fibrillation  Patient has paroxysmal (<7 days) atrial fibrillation. Patient is currently in atrial fibrillation. [id number] Score: 3. The patients heart rate in the last 24 hours is as follows:  Pulse  Min: 57  Max: 116     Antiarrhythmics  , 2 times daily, Oral  , Daily, Oral  metoprolol succinate (TOPROL-XL) 24 hr tablet 50 mg, Daily, Oral  amiodarone tablet 200 mg, Daily, Oral    Anticoagulants  apixaban tablet 2.5 mg, 2 times daily, Oral    Plan  - Replete lytes with a goal of K>4, Mg >2  - Patient is anticoagulated, see medications listed above.  - Patient's afib is currently uncontrolled. Will adjust treatment as follows:    - Amiodarone 200 mg bid, Digoxin 500 mcg once then po 0.125 mcg, toprol  - o/p to  Dr. Ulloa EP specialist unless unable to control HR I/p. Will consider transfer    1/17/25  Converted to rate controlled SR overnight  Continue current tx   o/p to Dr. Ulloa EP specialist               VTE Risk Mitigation (From admission, onward)           Ordered     apixaban tablet 2.5 mg  2 times daily         01/16/25 1200     IP VTE HIGH RISK PATIENT  Once         01/16/25 1200     Place sequential compression device  Until discontinued         01/16/25 1200     Reason for No Pharmacological VTE Prophylaxis  Once        Question:  Reasons:  Answer:  Already adequately anticoagulated on oral Anticoagulants    01/16/25 1200                    ALIYA South  Cardiology  [facility] - [address] [place] [address]

## 2025-01-17 NOTE — ASSESSMENT & PLAN NOTE
NYHA Class II Stage C - Recent d/c [facility]. LHC demonstrated NICM EF 25%@ [facility].   pBnp 17,240 - agree with IV Lasix  Dopamine 2.5 mcg/kg/mn  GDMT: Entresto, Jardiance, Lasix, Toprol  Chest x-ray showed no acute findings other than cardiomegaly and some interstitial prominence.  Low sodium diet - I/O - Weights     1/17/25  Improvement in SOB  Generous urine output  Continue Dopamine 1 more day

## 2025-01-17 NOTE — HOSPITAL COURSE
January 17, 2025   Patient feels much better, no shortness of breath.  Vital signs blood pressure 104/52, heart rate 58.    Patient was seen and evaluated by cardiologist, medications adjusted, patient started on dopamine infusion.    Beta blockers dose decreased in view of restrictive cardiomyopathy, workup sent for possible amyloidosis in view of bilateral atrial enlargement.    AFib RVR resolved, patient now is in normal sinus rhythm, patient was started on digoxin, amiodarone will be decreased to 200 mg daily, patient might benefit from ablation as outpatient or AV robbi ablation followed with CRT device PTT   Continue diuretics.  Continue Eliquis anticoagulation.  Patient was started on Entresto, previously noted at [place] that she was not tolerating Entresto, we will continue to monitor.    January 18, 2025   Patient continues to improve, chest pain resolved, no shortness of breath, she is in normal sinus rhythm.    Seen and evaluated by cardiologist, dopamine discontinued and she was cleared for discharge.    To continue on amiodarone once daily, added digoxin, decrease the dose of beta blockers, restarted Entresto.    To continue on home Lasix 40 mg p.o. daily.    Continue Eliquis anticoagulation   Follow up with Cardiology and PCP as outpatient.

## 2025-01-17 NOTE — ASSESSMENT & PLAN NOTE
Patient has paroxysmal (<7 days) atrial fibrillation. Patient is currently in atrial fibrillation. [id number] Score: 3. The patients heart rate in the last 24 hours is as follows:  Pulse  Min: 57  Max: 116     Antiarrhythmics  , 2 times daily, Oral  , Daily, Oral  metoprolol succinate (TOPROL-XL) 24 hr tablet 50 mg, Daily, Oral  amiodarone tablet 200 mg, Daily, Oral    Anticoagulants  apixaban tablet 2.5 mg, 2 times daily, Oral    Plan  - Replete lytes with a goal of K>4, Mg >2  - Patient is anticoagulated, see medications listed above.  - Patient's afib is currently uncontrolled. Will adjust treatment as follows:    - Amiodarone 200 mg bid, Digoxin 500 mcg once then po 0.125 mcg, toprol  - o/p to Dr. Ulloa EP specialist unless unable to control HR I/p. Will consider transfer    1/17/25  Converted to rate controlled SR overnight  Continue current tx   o/p to Dr. Ulloa EP specialist

## 2025-01-17 NOTE — PROGRESS NOTES
[facility] Tele[facility]  Wound Care    Patient Name:  Flaquita Sosa   MRN:  [id number]  Date: 1/17/2025  Diagnosis: Acute on chronic heart failure    History:     Past Medical History:   Diagnosis Date    Hyperlipidemia     Hypertension        Social History     Socioeconomic History    Marital status:    Tobacco Use    Smoking status: Never     Passive exposure: Never    Smokeless tobacco: Never   Substance and Sexual Activity    Alcohol use: Never    Drug use: Never    Sexual activity: Not Currently     Partners: Male     Social Drivers of Health     Financial Resource Strain: Low Risk  (1/17/2025)    Overall Financial Resource Strain (CARDIA)     Difficulty of Paying Living Expenses: Not hard at all   Food Insecurity: No Food Insecurity (1/17/2025)    Hunger Vital Sign     Worried About Running Out of Food in the Last Year: Never true     Ran Out of Food in the Last Year: Never true   Transportation Needs: No Transportation Needs (1/17/2025)    TRANSPORTATION NEEDS     Transportation : No   Stress: No Stress Concern Present (1/17/2025)    Citizen of [place] of Occupational Health - Occupational Stress Questionnaire     Feeling of Stress : Not at all   Housing Stability: Low Risk  (1/17/2025)    Housing Stability Vital Sign     Unable to Pay for Housing in the Last Year: No     Homeless in the Last Year: No       Precautions:     Allergies as of 01/16/2025 - Reviewed 01/16/2025   Allergen Reaction Noted    Iodinated contrast media  12/23/2023       [facility] Assessment Details/Treatment     Narrative: Seen patient for initial preventative skin care measures.  Foam borders noted to bilateral heels and sacral. No redness or open wounds noted. Patient ambulates.  Consult wound care of any findings.      01/17/2025

## 2025-01-17 NOTE — SUBJECTIVE & OBJECTIVE
Past Medical History:   Diagnosis Date    Hyperlipidemia     Hypertension        Past Surgical History:   Procedure Laterality Date    HYSTERECTOMY      OOPHORECTOMY         Review of patient's allergies indicates:   Allergen Reactions    Iodinated contrast media        No current facility-administered medications on file prior to encounter.     Current Outpatient Medications on File Prior to Encounter   Medication Sig    amiodarone (PACERONE) 200 MG Tab Take 200 mg by mouth 2 (two) times daily.    azelastine (ASTELIN) 137 mcg (0.1 %) nasal spray 1 spray (137 mcg total) by Nasal route 2 (two) times daily.    ELIQUIS 2.5 mg Tab Take 2.5 mg by mouth 2 (two) times daily.    flecainide (TAMBOCOR) 50 MG Tab Take 50 mg by mouth every 12 (twelve) hours.    furosemide (LASIX) 40 MG tablet Take 40 mg by mouth once daily.    JARDIANCE 10 mg tablet Take 10 mg by mouth once daily.    metoprolol succinate (TOPROL-XL) 100 MG 24 hr tablet Take 100 mg by mouth once daily.    olmesartan (BENICAR) 5 MG Tab Take 5 mg by mouth once daily.    pravastatin (PRAVACHOL) 40 MG tablet Take 40 mg by mouth every evening.    spironolactone (ALDACTONE) 25 MG tablet Take 25 mg by mouth 2 (two) times daily.    olmesartan-hydrochlorothiazide (BENICAR HCT) 40-12.5 mg Tab      Family History    Family history is unknown by patient.       Tobacco Use    Smoking status: Never     Passive exposure: Never    Smokeless tobacco: Never   Substance and Sexual Activity    Alcohol use: Never    Drug use: Never    Sexual activity: Not Currently     Partners: Male     Review of Systems   Constitutional:  Negative for chills and fever.   HENT:  Negative for congestion.    Eyes:  Negative for pain and discharge.   Respiratory:  Positive for chest tightness and shortness of breath. Negative for cough, wheezing and stridor.    Cardiovascular:  Positive for chest pain. Negative for palpitations and leg swelling.   Gastrointestinal:  Negative for abdominal distention,  abdominal pain, blood in stool, diarrhea, nausea and vomiting.   Endocrine: Negative for cold intolerance, polydipsia and polyuria.   Genitourinary:  Negative for difficulty urinating, dysuria, frequency, hematuria and urgency.   Musculoskeletal:  Negative for arthralgias, back pain and neck pain.   Neurological:  Negative for dizziness, seizures, syncope, weakness, numbness and headaches.   Hematological:  Negative for adenopathy.   Psychiatric/Behavioral:  Negative for behavioral problems.    All other systems reviewed and are negative.    Objective:     Vital Signs (Most Recent):  Temp: 98 °F (36.7 °C) (01/17/25 0937)  Pulse: (!) 58 (01/17/25 0937)  Resp: 15 (01/17/25 0414)  BP: (!) 104/52 (01/17/25 0937)  SpO2: 96 % (01/17/25 0937) Vital Signs (24h Range):  Temp:  [97.5 °F (36.4 °C)-98 °F (36.7 °C)] 98 °F (36.7 °C)  Pulse:  [] 58  Resp:  [15-22] 15  SpO2:  [93 %-99 %] 96 %  BP: ()/(47-73) 104/52     Weight: 71.2 kg (156 lb 15.5 oz)  Body mass index is 23.87 kg/m².     Physical Exam  Vitals reviewed.   HENT:      Nose: Nose normal.      Mouth/Throat:      Pharynx: Oropharynx is clear.   Eyes:      Pupils: Pupils are equal, round, and reactive to light.   Cardiovascular:      Rate and Rhythm: Tachycardia present. Rhythm irregular.      Pulses: Normal pulses.      Heart sounds: Normal heart sounds. No murmur heard.  Pulmonary:      Effort: Pulmonary effort is normal. No respiratory distress.      Breath sounds: Normal breath sounds. No stridor. No wheezing or rales.   Abdominal:      General: Abdomen is flat. Bowel sounds are normal. There is no distension.      Tenderness: There is no abdominal tenderness. There is no rebound.   Musculoskeletal:         General: No swelling or deformity. Normal range of motion.      Cervical back: Normal range of motion and neck supple.   Skin:     General: Skin is warm.      Coloration: Skin is not jaundiced or pale.   Neurological:      General: No focal deficit  present.      Mental Status: She is alert and oriented to person, place, and time. Mental status is at baseline.      Cranial Nerves: No cranial nerve deficit.      Sensory: No sensory deficit.      Motor: No weakness.   Psychiatric:         Mood and Affect: Mood normal.         Behavior: Behavior normal.              CRANIAL NERVES     CN III, IV, VI   Pupils are equal, round, and reactive to light.       Significant Labs: All pertinent labs within the past 24 hours have been reviewed.    Significant Imaging: I have reviewed all pertinent imaging results/findings within the past 24 hours.

## 2025-01-17 NOTE — PROGRESS NOTES
[facility] Hospital Medicine  Progress Note    Patient Name: Flaquita Sosa  MRN: [id number]  Patient Class: IP- Inpatient   Admission Date: 1/16/2025  Length of Stay: 0 days  Attending Physician: Dejan Acharya MD  Primary Care Provider: Concetta Grady DO        Subjective     Principal Problem:Acute on chronic heart failure        HPI:  79-YEAR-OLD FEMALE PATIENT WITH PAST MEDICAL HISTORY OF HEART FAILURE WITH REDUCED EJECTION FRACTION, HYPERTENSION, CHRONIC ATRIAL FIBRILLATION, ASTHMA, DYSLIPIDEMIA AND HISTORY OF MIGRAINES.    PATIENT WAS DISCHARGED FROM [facility] yesterday, treated for AFib RVR, patient underwent cardioversion twice at [facility], converted to normal sinus rhythm.    Underwent left heart catheterization without intervention.    Echocardiogram showed ejection fraction of 25-30%.    Patient presented to the emergency department today with shortness of breaths started earlier this morning with on and off chest pain, currently patient has no chest pain.    Shortness of breath was episodic, no cough, no palpitation, no nausea no vomiting no headache no dizziness.    Blood pressure 140/80 heart rate 128 saturating 96% on room air, she was afebrile in the emergency department.    White blood cell count 16 hemoglobin 12 platelets count 217 INR 1.3 creatinine 1.7 with a BUN of 35 pro BNP 17,240 initial troponin was 28 repeat troponin was 30.    Chest x-ray of 01/15/2025 showed no acute findings other than cardiomegaly and some interstitial prominence.  Admitted to the hospitalist service for further care and management     Overview/Hospital Course:  January 17, 2025   Patient feels much better, no shortness of breath.  Vital signs blood pressure 104/52, heart rate 58.    Patient was seen and evaluated by cardiologist, medications adjusted, patient started on dopamine infusion.    Beta blockers dose decreased in view of restrictive  cardiomyopathy, workup sent for possible amyloidosis in view of bilateral atrial enlargement.    AFib RVR resolved, patient now is in normal sinus rhythm, patient was started on digoxin, amiodarone will be decreased to 200 mg daily, patient might benefit from ablation as outpatient or AV robbi ablation followed with CRT device PTT   Continue diuretics.  Continue Eliquis anticoagulation.  Patient was started on Entresto, previously noted at [place] that she was not tolerating Entresto, we will continue to monitor.    Past Medical History:   Diagnosis Date    Hyperlipidemia     Hypertension        Past Surgical History:   Procedure Laterality Date    HYSTERECTOMY      OOPHORECTOMY         Review of patient's allergies indicates:   Allergen Reactions    Iodinated contrast media        No current facility-administered medications on file prior to encounter.     Current Outpatient Medications on File Prior to Encounter   Medication Sig    amiodarone (PACERONE) 200 MG Tab Take 200 mg by mouth 2 (two) times daily.    azelastine (ASTELIN) 137 mcg (0.1 %) nasal spray 1 spray (137 mcg total) by Nasal route 2 (two) times daily.    ELIQUIS 2.5 mg Tab Take 2.5 mg by mouth 2 (two) times daily.    flecainide (TAMBOCOR) 50 MG Tab Take 50 mg by mouth every 12 (twelve) hours.    furosemide (LASIX) 40 MG tablet Take 40 mg by mouth once daily.    JARDIANCE 10 mg tablet Take 10 mg by mouth once daily.    metoprolol succinate (TOPROL-XL) 100 MG 24 hr tablet Take 100 mg by mouth once daily.    olmesartan (BENICAR) 5 MG Tab Take 5 mg by mouth once daily.    pravastatin (PRAVACHOL) 40 MG tablet Take 40 mg by mouth every evening.    spironolactone (ALDACTONE) 25 MG tablet Take 25 mg by mouth 2 (two) times daily.    olmesartan-hydrochlorothiazide (BENICAR HCT) 40-12.5 mg Tab      Family History    Family history is unknown by patient.       Tobacco Use    Smoking status: Never     Passive exposure: Never    Smokeless tobacco: Never    Substance and Sexual Activity    Alcohol use: Never    Drug use: Never    Sexual activity: Not Currently     Partners: Male     Review of Systems   Constitutional:  Negative for chills and fever.   HENT:  Negative for congestion.    Eyes:  Negative for pain and discharge.   Respiratory:  Positive for chest tightness and shortness of breath. Negative for cough, wheezing and stridor.    Cardiovascular:  Positive for chest pain. Negative for palpitations and leg swelling.   Gastrointestinal:  Negative for abdominal distention, abdominal pain, blood in stool, diarrhea, nausea and vomiting.   Endocrine: Negative for cold intolerance, polydipsia and polyuria.   Genitourinary:  Negative for difficulty urinating, dysuria, frequency, hematuria and urgency.   Musculoskeletal:  Negative for arthralgias, back pain and neck pain.   Neurological:  Negative for dizziness, seizures, syncope, weakness, numbness and headaches.   Hematological:  Negative for adenopathy.   Psychiatric/Behavioral:  Negative for behavioral problems.    All other systems reviewed and are negative.    Objective:     Vital Signs (Most Recent):  Temp: 98 °F (36.7 °C) (01/17/25 0937)  Pulse: (!) 58 (01/17/25 0937)  Resp: 15 (01/17/25 0414)  BP: (!) 104/52 (01/17/25 0937)  SpO2: 96 % (01/17/25 0937) Vital Signs (24h Range):  Temp:  [97.5 °F (36.4 °C)-98 °F (36.7 °C)] 98 °F (36.7 °C)  Pulse:  [] 58  Resp:  [15-22] 15  SpO2:  [93 %-99 %] 96 %  BP: ()/(47-73) 104/52     Weight: 71.2 kg (156 lb 15.5 oz)  Body mass index is 23.87 kg/m².     Physical Exam  Vitals reviewed.   HENT:      Nose: Nose normal.      Mouth/Throat:      Pharynx: Oropharynx is clear.   Eyes:      Pupils: Pupils are equal, round, and reactive to light.   Cardiovascular:      Rate and Rhythm: Tachycardia present. Rhythm irregular.      Pulses: Normal pulses.      Heart sounds: Normal heart sounds. No murmur heard.  Pulmonary:      Effort: Pulmonary effort is normal. No respiratory  distress.      Breath sounds: Normal breath sounds. No stridor. No wheezing or rales.   Abdominal:      General: Abdomen is flat. Bowel sounds are normal. There is no distension.      Tenderness: There is no abdominal tenderness. There is no rebound.   Musculoskeletal:         General: No swelling or deformity. Normal range of motion.      Cervical back: Normal range of motion and neck supple.   Skin:     General: Skin is warm.      Coloration: Skin is not jaundiced or pale.   Neurological:      General: No focal deficit present.      Mental Status: She is alert and oriented to person, place, and time. Mental status is at baseline.      Cranial Nerves: No cranial nerve deficit.      Sensory: No sensory deficit.      Motor: No weakness.   Psychiatric:         Mood and Affect: Mood normal.         Behavior: Behavior normal.              CRANIAL NERVES     CN III, IV, VI   Pupils are equal, round, and reactive to light.       Significant Labs: All pertinent labs within the past 24 hours have been reviewed.    Significant Imaging: I have reviewed all pertinent imaging results/findings within the past 24 hours.    Assessment and Plan     * Acute on chronic heart failure    Acute on chronic systolic nonischemic cardiomyopathy.    Heart catheterization January 14, 2025 showed ejection fraction 15-20%, clean cath no stents.  We will continue with IV diuresis   Continue with guideline directed therapy , beta blockers, SGLT2 inhibitors, not tolerating Entresto previously.  Continue to follow with Cardiology recommendations.    Daily weight   Input output charting     Dyslipidemia        HTN (hypertension)  Patient's blood pressure range in the last 24 hours was: BP  Min: 117/76  Max: 141/84.The patient's inpatient anti-hypertensive regimen is listed below:  Current Antihypertensives  , 2 times daily, Oral    Plan  - BP is controlled, no changes needed to their regimen      Pulmonary hypertension  Pulmonary hypertension    Pulmonary pressures in the 50s  Probably she will need a sleep study.        Chest pain      Atypical chest pain   Rule out MI serial EKGs and cardiac enzymes.    Trend enzymes   Telemetry   Cardiology evaluation appreciated       Atrial fibrillation  Patient has persistent (7 days or more) atrial fibrillation. Patient is currently in atrial fibrillation. [id number] Score: 3. The patients heart rate in the last 24 hours is as follows:  Pulse  Min: 97  Max: 128     Antiarrhythmics  , 2 times daily, Oral  , 2 times daily, Oral    Anticoagulants       Plan  - Replete lytes with a goal of K>4, Mg >2  - Patient is anticoagulated, see medications listed above.  - Patient's afib is currently uncontrolled. Will continue current treatment  - Atrial fibrillation with rapid ventricular response   Patient with chronic AFib, status post electrical cardioversion twice at [facility] last week.    Now patient is back in AFib with RVR.    Cardiology consulted   Continue amiodarone   Try to wean her off Cardizem due to impaired left ventricular ejection fraction, I will start beta blockers, to be addressed by cardiologist.    Continue Eliquis anticoagulation.            VTE Risk Mitigation (From admission, onward)           Ordered     apixaban tablet 2.5 mg  2 times daily         01/16/25 1200     IP VTE HIGH RISK PATIENT  Once         01/16/25 1200     Place sequential compression device  Until discontinued         01/16/25 1200     Reason for No Pharmacological VTE Prophylaxis  Once        Question:  Reasons:  Answer:  Already adequately anticoagulated on oral Anticoagulants    01/16/25 1200                    Discharge Planning   ISABELA:      Code Status: Full Code   Medical Readiness for Discharge Date:                            Dejan Acharya MD  Department of Hospital Medicine   [facility] Tele[facility]

## 2025-01-17 NOTE — SUBJECTIVE & OBJECTIVE
Interval History: Conversion to SR overnight . Reports feeling improved today.     Review of Systems   Cardiovascular:  Negative for chest pain, dyspnea on exertion, irregular heartbeat and palpitations.   Respiratory:  Negative for shortness of breath.    All other systems reviewed and are negative.    Objective:     Vital Signs (Most Recent):  Temp: 97.6 °F (36.4 °C) (01/17/25 1309)  Pulse: (!) 57 (01/17/25 1309)  Resp: 15 (01/17/25 0414)  BP: 118/63 (01/17/25 1309)  SpO2: 97 % (01/17/25 1309) Vital Signs (24h Range):  Temp:  [97.5 °F (36.4 °C)-98 °F (36.7 °C)] 97.6 °F (36.4 °C)  Pulse:  [] 57  Resp:  [15-22] 15  SpO2:  [93 %-99 %] 97 %  BP: ()/(47-70) 118/63     Weight: 71.2 kg (156 lb 15.5 oz)  Body mass index is 23.87 kg/m².     SpO2: 97 %         Intake/Output Summary (Last 24 hours) at 1/17/2025 1344  Last data filed at 1/17/2025 0917  Gross per 24 hour   Intake --   Output 1700 ml   Net -1700 ml       Lines/Drains/Airways       Peripheral Intravenous Line  Duration                  Peripheral IV - Single Lumen 01/16/25 0613 20 G Anterior;Right Forearm 1 day                       Physical Exam  Vitals reviewed.   Constitutional:       General: She is not in acute distress.     Appearance: She is not ill-appearing.   HENT:      Head: Normocephalic and atraumatic.      Mouth/Throat:      Mouth: Mucous membranes are moist.   Eyes:      Extraocular Movements: Extraocular movements intact.   Cardiovascular:      Rate and Rhythm: Normal rate and regular rhythm.      Pulses: Normal pulses.      Heart sounds: Normal heart sounds.   Pulmonary:      Effort: Pulmonary effort is normal.   Abdominal:      General: Bowel sounds are normal.      Palpations: Abdomen is soft.   Musculoskeletal:         General: No swelling. Normal range of motion.      Cervical back: Normal range of motion.   Skin:     General: Skin is warm and dry.      Capillary Refill: Capillary refill takes less than 2 seconds.   Neurological:       General: No focal deficit present.      Mental Status: She is alert and oriented to person, place, and time.      Cranial Nerves: No cranial nerve deficit.      Sensory: No sensory deficit.   Psychiatric:         Mood and Affect: Mood normal.            Significant Labs: All pertinent lab results from the last 24 hours have been reviewed.    Significant Imaging: Echocardiogram: Transthoracic echo (TTE) complete (Cupid Only):   Results for orders placed or performed during the hospital encounter of 01/16/25   Echo   Result Value Ref Range    BSA 1.87 m2    A4C EF 20 %    LVOT stroke volume 56.1 cm3    LVIDd 5.2 3.5 - 6.0 cm    LV Systolic Volume 96.26 mL    LV Systolic Volume Index 51.8 mL/m2    LVIDs 4.6 (A) 2.1 - 4.0 cm    LV Diastolic Volume 129.11 mL    LV ESV A4C 69.24 mL    LV Diastolic Volume Index 69.41 mL/m2    LV EDV A4C 67.93 mL    Left Ventricular End Systolic Volume by Teichholz Method 96.26 mL    Left Ventricular End Diastolic Volume by Teichholz Method 129.11 mL    IVS 1.6 (A) 0.6 - 1.1 cm    LVOT diameter 2.3 cm    LVOT area 4.2 cm2    FS 11.5 (A) 28 - 44 %    Left Ventricle Relative Wall Thickness 0.50 cm    PW 1.3 (A) 0.6 - 1.1 cm    LV mass 325.8 g    LV Mass Index 175.1 g/m2    TDI LATERAL 0.14 m/s    TDI SEPTAL 0.03 m/s    TR Max Hussein 3.4 m/s    LVOT peak hussein 0.7 m/s    Left Ventricular Outflow Tract Mean Velocity 0.52 cm/s    Left Ventricular Outflow Tract Mean Gradient 1.17 mmHg    RV- martinez basal diam 4.8 cm    RV-martinez mid d 4.4 cm    RV Basal Diameter 5.85 cm    RV-martinez length 5.9 cm    RV mid diameter 4.40 cm    TAPSE 1.22 cm    RV/LV Ratio 0.92 cm    LA size 4.6 cm    RA Major Axis 4.99 cm    AV regurgitation pressure 1/2 time 395 ms    AR Max Hussein 4.19 m/s    AV mean gradient 3 mmHg    AV peak gradient 7 mmHg    Ao peak hussein 1.3 m/s    Ao VTI 16.0 cm    LVOT peak VTI 13.5 cm    AV valve area 3.5 cm²    AV Velocity Ratio 0.54     AV index (prosthetic) 0.84     JAMIE by Velocity Ratio 2.2 cm²     Mr max phoebe 4.83 m/s    Triscuspid Valve Regurgitation Peak Gradient 46 mmHg    PV PEAK VELOCITY 0.99 m/s    PV peak gradient 4 mmHg    Ao root annulus 2.63 cm    IVC diameter 2.59 cm    Mean e' 0.09 m/s    ZLVIDS 2.89     ZLVIDD 0.05     LA area A4C 23.62 cm2    AORTIC VALVE CUSP SEPERATION 2.30 cm    TV resting pulmonary artery pressure 61 mmHg    RV TB RVSP 18 mmHg    Est. RA pres 15 mmHg    [place]'s Biplane MOD Ejection Fraction 17 %    Narrative      Left Ventricle: The left ventricle is moderately dilated. Mildly   increased wall thickness. There is concentric hypertrophy. Septal motion   is consistent with bundle branch block. There is severely reduced systolic   function with a visually estimated ejection fraction of 15 - 20%.   Quantitated ejection fraction is 17%. Unable to assess diastolic function   due to atrial fibrillation.    Right Ventricle: Moderate right ventricular enlargement. Systolic   function is moderately reduced.    Left Atrium: Left atrium is severely dilated.    Right Atrium: Right atrium is severely dilated.    Aortic Valve: The aortic valve is a trileaflet valve. Mildly calcified   cusps. There is mild aortic regurgitation with a centrally directed jet.    Mitral Valve: There is mild bileaflet sclerosis. Mildly thickened   leaflets. There is moderate to severe regurgitation with a centrally   directed jet.    Tricuspid Valve: There is mild to moderate regurgitation with an   eccentrically directed jet.    Pulmonic Valve: There is mild regurgitation.    Pulmonary Artery: The estimated pulmonary artery systolic pressure is   61 mmHg.    IVC/SVC: Elevated venous pressure at 15 mmHg.    Pericardium: There is a small effusion.

## 2025-01-18 VITALS
OXYGEN SATURATION: 97 % | BODY MASS INDEX: 23.45 KG/M2 | TEMPERATURE: 98 F | HEART RATE: 66 BPM | SYSTOLIC BLOOD PRESSURE: 112 MMHG | DIASTOLIC BLOOD PRESSURE: 73 MMHG | WEIGHT: 154.75 LBS | RESPIRATION RATE: 19 BRPM | HEIGHT: 68 IN

## 2025-01-18 LAB
ANION GAP SERPL CALCULATED.3IONS-SCNC: 17 MMOL/L (ref 7–16)
BUN SERPL-MCNC: 25 MG/DL (ref 10–20)
BUN/CREAT SERPL: 18 (ref 6–20)
CALCIUM SERPL-MCNC: 9.1 MG/DL (ref 8.4–10.2)
CHLORIDE SERPL-SCNC: 102 MMOL/L (ref 98–107)
CO2 SERPL-SCNC: 27 MMOL/L (ref 23–31)
CREAT SERPL-MCNC: 1.4 MG/DL (ref 0.55–1.02)
EGFR (NO RACE VARIABLE) (RUSH/TITUS): 39 ML/MIN/1.73M2
GLUCOSE SERPL-MCNC: 104 MG/DL (ref 82–115)
OHS QRS DURATION: 176 MS
OHS QTC CALCULATION: 458 MS
POTASSIUM SERPL-SCNC: 3.9 MMOL/L (ref 3.5–5.1)
SODIUM SERPL-SCNC: 142 MMOL/L (ref 136–145)

## 2025-01-18 PROCEDURE — 25000003 PHARM REV CODE 250: Performed by: INTERNAL MEDICINE

## 2025-01-18 PROCEDURE — 36415 COLL VENOUS BLD VENIPUNCTURE: CPT | Performed by: INTERNAL MEDICINE

## 2025-01-18 PROCEDURE — 80048 BASIC METABOLIC PNL TOTAL CA: CPT | Performed by: STUDENT IN AN ORGANIZED HEALTH CARE EDUCATION/TRAINING PROGRAM

## 2025-01-18 PROCEDURE — 36415 COLL VENOUS BLD VENIPUNCTURE: CPT | Performed by: STUDENT IN AN ORGANIZED HEALTH CARE EDUCATION/TRAINING PROGRAM

## 2025-01-18 PROCEDURE — 25000003 PHARM REV CODE 250: Performed by: STUDENT IN AN ORGANIZED HEALTH CARE EDUCATION/TRAINING PROGRAM

## 2025-01-18 PROCEDURE — 63600175 PHARM REV CODE 636 W HCPCS: Performed by: STUDENT IN AN ORGANIZED HEALTH CARE EDUCATION/TRAINING PROGRAM

## 2025-01-18 RX ORDER — AMIODARONE HYDROCHLORIDE 200 MG/1
200 TABLET ORAL DAILY
Start: 2025-01-18 | End: 2025-01-29 | Stop reason: SDUPTHER

## 2025-01-18 RX ORDER — DIAZEPAM 5 MG/1
5 TABLET ORAL EVERY 12 HOURS PRN
Start: 2025-01-18 | End: 2025-01-29

## 2025-01-18 RX ORDER — METOPROLOL SUCCINATE 50 MG/1
50 TABLET, EXTENDED RELEASE ORAL DAILY
Qty: 30 TABLET | Refills: 0 | Status: SHIPPED | OUTPATIENT
Start: 2025-01-19 | End: 2025-01-29 | Stop reason: SDUPTHER

## 2025-01-18 RX ORDER — HYDROCODONE BITARTRATE AND ACETAMINOPHEN 5; 325 MG/1; MG/1
1 TABLET ORAL EVERY 6 HOURS PRN
Start: 2025-01-18 | End: 2025-01-29

## 2025-01-18 RX ORDER — FAMOTIDINE 10 MG/ML
20 INJECTION INTRAVENOUS DAILY
Start: 2025-01-19

## 2025-01-18 RX ORDER — FAMOTIDINE 20 MG/1
20 TABLET, FILM COATED ORAL DAILY
Status: DISCONTINUED | OUTPATIENT
Start: 2025-01-19 | End: 2025-01-18 | Stop reason: HOSPADM

## 2025-01-18 RX ORDER — DIGOXIN 125 MCG
0.12 TABLET ORAL DAILY
Qty: 30 TABLET | Refills: 0 | Status: SHIPPED | OUTPATIENT
Start: 2025-01-19 | End: 2025-01-29 | Stop reason: SDUPTHER

## 2025-01-18 RX ORDER — SACUBITRIL AND VALSARTAN 24; 26 MG/1; MG/1
1 TABLET, FILM COATED ORAL 2 TIMES DAILY
Qty: 60 TABLET | Refills: 0 | Status: SHIPPED | OUTPATIENT
Start: 2025-01-18 | End: 2025-01-29 | Stop reason: DRUGHIGH

## 2025-01-18 RX ORDER — FUROSEMIDE 40 MG/1
40 TABLET ORAL DAILY
Qty: 30 TABLET | Refills: 0 | Status: SHIPPED | OUTPATIENT
Start: 2025-01-18 | End: 2025-01-29 | Stop reason: SDUPTHER

## 2025-01-18 RX ORDER — DOCUSATE SODIUM 100 MG/1
100 CAPSULE, LIQUID FILLED ORAL 2 TIMES DAILY
Start: 2025-01-18

## 2025-01-18 RX ADMIN — DIGOXIN 0.12 MG: 125 TABLET ORAL at 08:01

## 2025-01-18 RX ADMIN — SACUBITRIL AND VALSARTAN 1 TABLET: 24; 26 TABLET, FILM COATED ORAL at 08:01

## 2025-01-18 RX ADMIN — FERROUS SULFATE TAB 325 MG (65 MG ELEMENTAL FE) 1 EACH: 325 (65 FE) TAB at 08:01

## 2025-01-18 RX ADMIN — APIXABAN 2.5 MG: 2.5 TABLET, FILM COATED ORAL at 08:01

## 2025-01-18 RX ADMIN — METOPROLOL SUCCINATE 50 MG: 25 TABLET, EXTENDED RELEASE ORAL at 08:01

## 2025-01-18 RX ADMIN — DOCUSATE SODIUM 100 MG: 100 CAPSULE, LIQUID FILLED ORAL at 08:01

## 2025-01-18 RX ADMIN — FUROSEMIDE 40 MG: 10 INJECTION, SOLUTION INTRAMUSCULAR; INTRAVENOUS at 05:01

## 2025-01-18 RX ADMIN — FAMOTIDINE 20 MG: 10 INJECTION, SOLUTION INTRAVENOUS at 08:01

## 2025-01-18 RX ADMIN — AMIODARONE HYDROCHLORIDE 200 MG: 200 TABLET ORAL at 08:01

## 2025-01-18 RX ADMIN — EMPAGLIFLOZIN 10 MG: 10 TABLET, FILM COATED ORAL at 08:01

## 2025-01-18 NOTE — PROGRESS NOTES
Pharmacist Intervention IV to PO Note    Flaquita Sosa is a 79 y.o. female being treated with IV medication famotidine    Patient Data:    Vital Signs (Most Recent):  Temp: 97.7 °F (36.5 °C) (01/18/25 1200)  Pulse: 66 (01/18/25 1200)  Resp: 19 (01/18/25 1200)  BP: 112/73 (01/18/25 1200)  SpO2: 97 % (01/18/25 1200) Vital Signs (72h Range):  Temp:  [97.4 °F (36.3 °C)-98.4 °F (36.9 °C)]   Pulse:  []   Resp:  [12-29]   BP: ()/(47-84)   SpO2:  [93 %-99 %]      CBC:  Recent Labs   Lab 01/16/25  0622   WBC 16.31*   RBC 3.84*   HGB 12.5   HCT 42.2      .9*   MCH 32.6*   MCHC 29.6*     CMP:     Recent Labs   Lab 01/16/25  0622 01/16/25  1215 01/17/25  0947 01/18/25  0342     --  88 104   CALCIUM 8.8  --  9.2 9.1   ALBUMIN 3.7  --   --   --    PROT 6.9 6.1  --   --      --  144 142   K 3.6  --  3.7 3.9   CO2 21*  --  27 27     --  104 102   BUN 35*  --  24* 25*   CREATININE 1.74*  --  1.58* 1.40*   ALKPHOS 100  --   --   --    ALT 23  --   --   --    AST 30  --   --   --    BILITOT 0.4  --   --   --        Dietary Orders:  Diet Orders            Diet Cardiac Standard Tray: Cardiac starting at 01/16 1217            Based on the following criteria, this patient qualifies for intravenous to oral conversion:  [x] The patients gastrointestinal tract is functioning (tolerating medications via oral or enteral route for 24 hours and tolerating food or enteral feeds for 24 hours).  [x] The patient is hemodynamically stable for 24 hours (heart rate <100 beats per minute, systolic blood pressure >99 mm Hg, and respiratory rate <20 breaths per minute).  [x] The patient shows clinical improvement (afebrile for at least 24 hours and white blood cell count downtrending or normalized). Additionally, the patient must be non-neutropenic (absolute neutrophil count >500 cells/mm3).  [x] For antimicrobials, the patient has received IV therapy for at least 24 hours.    IV medication famotidine will  be changed to oral medication famotidine    Pharmacist's Name: Winifred Turner PharmD.  Pharmacist's Extension: [id number]

## 2025-01-18 NOTE — PLAN OF CARE
"[facility] - [address] [place], [place] [address]etry  Initial Discharge Assessment       Primary Care Provider: Concetta Grady, DO    Admission Diagnosis: Atrial fibrillation [I48.91]  Acute on chronic systolic heart failure [I50.23]  Acute systolic congestive heart failure [I50.21]  Chest pain [R07.9]    Admission Date: 1/16/2025  Expected Discharge Date: 1/18/2025    Transition of Care Barriers: (P) None    Payor: MEDICARE / Plan: MEDICARE PART A & B / Product Type: Government /     Extended Emergency Contact Information  Primary Emergency Contact: Calixto Sosa  Mobile Phone: [phone]  Relation: Son  Preferred language: English   needed? No    Discharge Plan A: (P) Home  Discharge Plan B: (P) Home, Home Health, Long-term acute care facility (LTAC), Rehab, Skilled Nursing Facility      [id number] - [place], [place] - [address] [facility] [address]  [address]  [facility] [address]  Phone: [phone] Fax: [phone]    [facility] [place] [address]  [address]  [facility] [address]  Phone: [phone] Fax: [phone]      Initial Assessment (most recent)       Adult Discharge Assessment - 01/18/25 1440          Discharge Assessment    Assessment Type Discharge Planning Assessment (P)      Confirmed/corrected address, phone number and insurance Yes (P)      Confirmed Demographics Correct on Facesheet (P)      Source of Information patient;family (P)      Communicated ISABELA with patient/caregiver Date not available/Unable to determine (P)      Reason For Admission Pt states, "Shortness of breath, chest pain." (P)      People in Home alone (P)      Do you expect to return to your current living situation? Yes (P)      Do you have help at home or someone to help you manage your care at home? Yes (P)      Who are your caregiver(s) and their phone number(s)? Calixto Sheila (Son) [phone]; Darlene Sosa " (Daughter-in-law) [phone] (P)      Prior to hospitilization cognitive status: Unable to Assess (P)      Current cognitive status: Alert/Oriented (P)      Walking or Climbing Stairs Difficulty no (P)      Dressing/Bathing Difficulty no (P)      Do you have any problems with: -- (P)    No problems reported    Home Layout Able to live on 1st floor (P)      Equipment Currently Used at Home none (P)      Readmission within 30 days? No (P)      Patient currently being followed by outpatient case management? No (P)      Do you currently have service(s) that help you manage your care at home? No (P)      Do you take prescription medications? Yes (P)      Do you have prescription coverage? Yes (P)      Coverage Medicare (P)      Do you have any problems affording any of your prescribed medications? No (P)      Is the patient taking medications as prescribed? yes (P)      Who is going to help you get home at discharge? Daughter -in-law (P)      How do you get to doctors appointments? car, drives self (P)      Are you on dialysis? No (P)      Do you take coumadin? No (P)      Discharge Plan A Home (P)      Discharge Plan B Home;Home Health;Long-term acute care facility (LTAC);Rehab;Skilled Nursing Facility (P)      DME Needed Upon Discharge  none (P)      Discharge Plan discussed with: Patient (P)      Transition of Care Barriers None (P)         Physical Activity    On average, how many days per week do you engage in moderate to strenuous exercise (like a brisk walk)? 5 days (P)      On average, how many minutes do you engage in exercise at this level? 20 min (P)         Financial Resource Strain    How hard is it for you to pay for the very basics like food, housing, medical care, and heating? Not hard at all (P)         Housing Stability    In the last 12 months, was there a time when you were not able to pay the mortgage or rent on time? No (P)      At any time in the past 12 months, were you homeless or living in a  shelter (including now)? No (P)         Transportation Needs    Has the lack of transportation kept you from medical appointments, meetings, work or from getting things needed for daily living? No (P)         Food Insecurity    Within the past 12 months, you worried that your food would run out before you got the money to buy more. Never true (P)      Within the past 12 months, the food you bought just didn't last and you didn't have money to get more. Never true (P)         Stress    Do you feel stress - tense, restless, nervous, or anxious, or unable to sleep at night because your mind is troubled all the time - these days? Not at all (P)         Social Isolation    How often do you feel lonely or isolated from those around you?  Never (P)         Alcohol Use    Q1: How often do you have a drink containing alcohol? Never (P)      Q2: How many drinks containing alcohol do you have on a typical day when you are drinking? Patient does not drink (P)      Q3: How often do you have six or more drinks on one occasion? Never (P)         Utilities    In the past 12 months has the electric, gas, oil, or water company threatened to shut off services in your home? No (P)         Health Literacy    How often do you need to have someone help you when you read instructions, pamphlets, or other written material from your doctor or pharmacy? Never (P)                    LANIE spoke with pt with daughter in law at bedside to obtain information for her initial discharge assessment.  Pt lives alone, is not current with home health and uses no durable medical equipment. Per pt, her discharge plans are to return back home when medically ready.  SDOH questions completed and IM obtained.  SS following for discharge needs as they arise.

## 2025-01-18 NOTE — DISCHARGE SUMMARY
[facility] Hospital Medicine  Discharge Summary      Patient Name: Flaquita Sosa  MRN: [id number]  ALISSA: [id number]  Patient Class: IP- Inpatient  Admission Date: 1/16/2025  Hospital Length of Stay: 1 days  Discharge Date and Time:  01/18/2025 12:50 PM  Attending Physician: Dejan Acharya MD   Discharging Provider: Dejan Acharya MD  Primary Care Provider: Concetta Grady DO    Primary Care Team: Networked reference to record PCT     HPI:   79-YEAR-OLD FEMALE PATIENT WITH PAST MEDICAL HISTORY OF HEART FAILURE WITH REDUCED EJECTION FRACTION, HYPERTENSION, CHRONIC ATRIAL FIBRILLATION, ASTHMA, DYSLIPIDEMIA AND HISTORY OF MIGRAINES.    PATIENT WAS DISCHARGED FROM [facility] yesterday, treated for AFib RVR, patient underwent cardioversion twice at [facility], converted to normal sinus rhythm.    Underwent left heart catheterization without intervention.    Echocardiogram showed ejection fraction of 25-30%.    Patient presented to the emergency department today with shortness of breaths started earlier this morning with on and off chest pain, currently patient has no chest pain.    Shortness of breath was episodic, no cough, no palpitation, no nausea no vomiting no headache no dizziness.    Blood pressure 140/80 heart rate 128 saturating 96% on room air, she was afebrile in the emergency department.    White blood cell count 16 hemoglobin 12 platelets count 217 INR 1.3 creatinine 1.7 with a BUN of 35 pro BNP 17,240 initial troponin was 28 repeat troponin was 30.    Chest x-ray of 01/15/2025 showed no acute findings other than cardiomegaly and some interstitial prominence.  Admitted to the hospitalist service for further care and management     * No surgery found *      Hospital Course:   January 17, 2025   Patient feels much better, no shortness of breath.  Vital signs blood pressure 104/52, heart rate 58.    Patient was seen and evaluated by cardiologist,  medications adjusted, patient started on dopamine infusion.    Beta blockers dose decreased in view of restrictive cardiomyopathy, workup sent for possible amyloidosis in view of bilateral atrial enlargement.    AFib RVR resolved, patient now is in normal sinus rhythm, patient was started on digoxin, amiodarone will be decreased to 200 mg daily, patient might benefit from ablation as outpatient or AV robbi ablation followed with CRT device PTT   Continue diuretics.  Continue Eliquis anticoagulation.  Patient was started on Entresto, previously noted at [facility] that she was not tolerating Entresto, we will continue to monitor.    January 18, 2025   Patient continues to improve, chest pain resolved, no shortness of breath, she is in normal sinus rhythm.    Seen and evaluated by cardiologist, dopamine discontinued and she was cleared for discharge.    To continue on amiodarone once daily, added digoxin, decrease the dose of beta blockers, restarted Entresto.    To continue on home Lasix 40 mg p.o. daily.    Continue Eliquis anticoagulation   Follow up with Cardiology and PCP as outpatient.     Goals of Care Treatment Preferences:  Code Status: Full Code      SDOH Screening:  The patient was screened for utility difficulties, food insecurity, transport difficulties, housing insecurity, and interpersonal safety and there were no concerns identified this admission.     Consults:   Consults (From admission, onward)          Status Ordering Provider     Inpatient consult to Cardiology  Once        Provider:  Yosi Robertson MD    Completed AMAYA SRINIVASAN            * Acute on chronic heart failure    Acute on chronic systolic nonischemic cardiomyopathy.    Heart catheterization January 14, 2025 showed ejection fraction 15-20%, clean cath no stents.  We will continue with IV diuresis   Continue with guideline directed therapy , beta blockers, SGLT2 inhibitors, not tolerating Entresto previously.  Continue to follow  with Cardiology recommendations.    Daily weight   Input output charting     Dyslipidemia        HTN (hypertension)  Patient's blood pressure range in the last 24 hours was: BP  Min: 117/76  Max: 141/84.The patient's inpatient anti-hypertensive regimen is listed below:  Current Antihypertensives  , 2 times daily, Oral    Plan  - BP is controlled, no changes needed to their regimen      Pulmonary hypertension  Pulmonary hypertension   Pulmonary pressures in the 50s  Probably she will need a sleep study.        Chest pain      Atypical chest pain   Rule out MI serial EKGs and cardiac enzymes.    Trend enzymes   Telemetry   Cardiology evaluation appreciated       Atrial fibrillation  Patient has persistent (7 days or more) atrial fibrillation. Patient is currently in atrial fibrillation. [id number] Score: 3. The patients heart rate in the last 24 hours is as follows:  Pulse  Min: 97  Max: 128     Antiarrhythmics  , 2 times daily, Oral  , 2 times daily, Oral    Anticoagulants       Plan  - Replete lytes with a goal of K>4, Mg >2  - Patient is anticoagulated, see medications listed above.  - Patient's afib is currently uncontrolled. Will continue current treatment  - Atrial fibrillation with rapid ventricular response   Patient with chronic AFib, status post electrical cardioversion twice at [facility] last week.    Now patient is back in AFib with RVR.    Cardiology consulted   Continue amiodarone   Try to wean her off Cardizem due to impaired left ventricular ejection fraction, I will start beta blockers, to be addressed by cardiologist.    Continue Eliquis anticoagulation.            Final Active Diagnoses:    Diagnosis Date Noted POA    PRINCIPAL PROBLEM:  Acute on chronic heart failure [I50.9] 01/16/2025 Yes    Atrial fibrillation [I48.91] 01/16/2025 Yes    Chest pain [R07.9] 01/16/2025 Yes    Pulmonary hypertension [I27.20] 01/16/2025 Unknown    HTN (hypertension) [I10] 01/16/2025 Unknown    Dyslipidemia  [E78.5] 01/16/2025 Unknown    Acute systolic congestive heart failure [I50.21] 01/16/2025 Unknown      Problems Resolved During this Admission:       Discharged Condition: good    Disposition: Home or Self Care    Follow Up:   Follow-up Information       Concetta Grady, DO Follow up in 1 week(s).    Specialty: Internal Medicine  Contact information:  [address] [facility] [address]  [phone]               Yosi Robertson MD Follow up in 1 week(s).    Specialties: Interventional Cardiology, Cardiology  Contact information:  [address] [facility] [address]  [phone]                           Patient Instructions:      Ambulatory referral/consult to Electrophysiology   Standing Status: Future   Referral Priority: Routine Referral Type: Consultation   Referral Reason: Specialty Services Required   Requested Specialty: Electrophysiology   Number of Visits Requested: 1       Significant Diagnostic Studies: N/A    Pending Diagnostic Studies:       Procedure Component Value Units Date/Time    EKG 12-lead [[id number]]     Order Status: Sent Lab Status: No result     EXTRA TUBES [[id number]] Collected: 01/18/25 0417    Order Status: Sent Lab Status: In process Updated: 01/18/25 0417    Specimen: Blood, Venous     Narrative:      The following orders were created for panel order EXTRA TUBES.  Procedure                               Abnormality         Status                     ---------                               -----------         ------                     Lavender Top Hold[[id number]]                               In process                   Please view results for these tests on the individual orders.    Immunoglobulin Free LT Chains Blood [[id number]] Collected: 01/16/25 1215    Order Status: Sent Lab Status: In process Updated: 01/16/25 1223    Specimen: Blood            Medications:  Reconciled Home Medications:      Medication List        START taking these  medications      diazePAM 5 MG tablet  Commonly known as: VALIUM  Take 1 tablet (5 mg total) by mouth every 12 (twelve) hours as needed for Anxiety.     digoxin 125 mcg tablet  Commonly known as: LANOXIN  Take 1 tablet (0.125 mg total) by mouth once daily.  Start taking on: January 19, 2025     docusate sodium 100 MG capsule  Commonly known as: COLACE  Take 1 capsule (100 mg total) by mouth 2 (two) times daily.     famotidine (PF) 20 mg/2 mL Soln  Inject 2 mLs (20 mg total) into the vein once daily.  Start taking on: January 19, 2025     HYDROcodone-acetaminophen 5-325 mg per tablet  Commonly known as: NORCO  Take 1 tablet by mouth every 6 (six) hours as needed for Pain.     sacubitriL-valsartan 24-26 mg per tablet  Commonly known as: ENTRESTO  Take 1 tablet by mouth 2 (two) times daily.            CHANGE how you take these medications      amiodarone 200 MG Tab  Commonly known as: PACERONE  Take 1 tablet (200 mg total) by mouth once daily.  What changed: when to take this     metoprolol succinate 50 MG 24 hr tablet  Commonly known as: TOPROL-XL  Take 1 tablet (50 mg total) by mouth once daily.  Start taking on: January 19, 2025  What changed:   medication strength  how much to take            CONTINUE taking these medications      ELIQUIS 2.5 mg Tab  Generic drug: apixaban  Take 2.5 mg by mouth 2 (two) times daily.     furosemide 40 MG tablet  Commonly known as: LASIX  Take 1 tablet (40 mg total) by mouth once daily.     JARDIANCE 10 mg tablet  Generic drug: empagliflozin  Take 10 mg by mouth once daily.     pravastatin 40 MG tablet  Commonly known as: PRAVACHOL  Take 40 mg by mouth every evening.     spironolactone 25 MG tablet  Commonly known as: ALDACTONE  Take 25 mg by mouth 2 (two) times daily.            STOP taking these medications      azelastine 137 mcg (0.1 %) nasal spray  Commonly known as: ASTELIN     flecainide 50 MG Tab  Commonly known as: TAMBOCOR     olmesartan 5 MG Tab  Commonly known as:  BENICAR     olmesartan-hydrochlorothiazide 40-12.5 mg Tab  Commonly known as: BENICAR HCT              Indwelling Lines/Drains at time of discharge:   Lines/Drains/Airways       None                   Time spent on the discharge of patient: 45 minutes         Dejan Acharya MD  Department of Hospital Medicine  [facility]

## 2025-01-18 NOTE — NURSING
1450 AVS summary reviewed with pt, questions answered. IV removed, no distress noted.  1500 Pt discharged, transported via wheelchair. No distress noted.

## 2025-01-20 LAB
KAPPA LC FREE SER NEPH-MCNC: 1.65 MG/DL (ref 0.33–1.94)
KAPPA LC FREE/LAMBDA FREE SER NEPH: 1.43 {RATIO} (ref 0.26–1.65)
LAMBDA LC FREE SERPL-MCNC: 1.15 MG/DL (ref 0.57–2.63)

## 2025-01-21 NOTE — PLAN OF CARE
[facility] Tele[facility]  Discharge Final Note    Primary Care Provider: Concetta Grady DO    Expected Discharge Date: 1/18/2025    Final Discharge Note (most recent)       Final Note - 01/21/25 1318          Final Note    Assessment Type Final Discharge Note     Anticipated Discharge Disposition Home or Self Care        Post-Acute Status    Discharge Delays None known at this time                     Important Message from Medicare  Important Message from Medicare regarding Discharge Appeal Rights: Explained to patient/caregiver, Signed/date by patient/caregiver     Date IMM was signed: 01/18/25  Time IMM was signed: 1107    Contact Info       Concetta Grady DO   Specialty: Internal Medicine   Relationship: PCP - General    [address] [facility] [address]   Phone: [phone]       Next Steps: Follow up in 1 week(s)    Yois Robertson MD   Specialty: Interventional Cardiology, Cardiology    [address] [place] [address]   Phone: [phone]       Next Steps: Follow up in 1 week(s)

## 2025-01-22 ENCOUNTER — PATIENT OUTREACH (OUTPATIENT)
Dept: ADMINISTRATIVE | Facility: CLINIC | Age: 80
End: 2025-01-22

## 2025-01-22 NOTE — PROGRESS NOTES
C3 nurse spoke with Flaquita Sosa  for a TCC post hospital discharge follow up call. The patient has a scheduled [facility] appointment with ALVARO Peraza on 1/23/25 @ 1500.

## 2025-01-23 ENCOUNTER — OFFICE VISIT (OUTPATIENT)
Dept: FAMILY MEDICINE | Facility: CLINIC | Age: 80
End: 2025-01-23
Payer: MEDICARE

## 2025-01-23 VITALS
HEART RATE: 80 BPM | BODY MASS INDEX: 24.71 KG/M2 | HEIGHT: 68 IN | WEIGHT: 163 LBS | RESPIRATION RATE: 15 BRPM | SYSTOLIC BLOOD PRESSURE: 138 MMHG | TEMPERATURE: 98 F | OXYGEN SATURATION: 96 % | DIASTOLIC BLOOD PRESSURE: 71 MMHG

## 2025-01-23 DIAGNOSIS — Z09 HOSPITAL DISCHARGE FOLLOW-UP: Primary | ICD-10-CM

## 2025-01-23 DIAGNOSIS — I48.91 ATRIAL FIBRILLATION, UNSPECIFIED TYPE: ICD-10-CM

## 2025-01-23 DIAGNOSIS — I50.23 ACUTE ON CHRONIC SYSTOLIC HEART FAILURE: ICD-10-CM

## 2025-01-23 PROCEDURE — 99213 OFFICE O/P EST LOW 20 MIN: CPT | Mod: ,,, | Performed by: NURSE PRACTITIONER

## 2025-01-23 NOTE — PROGRESS NOTES
[facility]    Chief Complaint      Chief Complaint   Patient presents with    Hospital Follow Up    Establish Care       History of Present Illness      Flaquita Sosa is a 79 y.o. female. She  has a past medical history of Hyperlipidemia and Hypertension., who presents today for Hospital F/U- was admitted 1/7/25- 1/15/25 at [facility] and then admitted to [facility] 1/16/25- 1/18/25 for heart failure and Afib. Medication list reconciled.  Has seen Dr. Robertson with Cardiology and they have referred her to [facility] for an ablation.     Past Medical History:  Past Medical History:   Diagnosis Date    Hyperlipidemia     Hypertension        Past Surgical History:   has a past surgical history that includes Oophorectomy and Hysterectomy.    Social History:  Social History     Tobacco Use    Smoking status: Never     Passive exposure: Never    Smokeless tobacco: Never   Substance Use Topics    Alcohol use: Never    Drug use: Never       I personally reviewed all past medical, surgical, and social.     Review of Systems   Constitutional:  Negative for fatigue.   Eyes:  Negative for visual disturbance.   Respiratory:  Negative for cough and shortness of breath.    Cardiovascular: Negative.    Gastrointestinal:  Negative for abdominal pain, constipation and diarrhea.   Genitourinary:  Negative for difficulty urinating.   Musculoskeletal:  Negative for gait problem.   Skin: Negative.    Neurological:  Negative for dizziness, weakness, light-headedness and headaches.        Medications:  Outpatient Encounter Medications as of 1/23/2025   Medication Sig Dispense Refill    docusate sodium (COLACE) 100 MG capsule Take 1 capsule (100 mg total) by mouth 2 (two) times daily.      [DISCONTINUED] amiodarone (PACERONE) 200 MG Tab Take 1 tablet (200 mg total) by mouth once daily.      [DISCONTINUED] digoxin (LANOXIN) 125 mcg tablet Take 1 tablet (0.125 mg total) by mouth once daily. 30 tablet 0     [DISCONTINUED] ELIQUIS 2.5 mg Tab Take 2.5 mg by mouth 2 (two) times daily.      [DISCONTINUED] furosemide (LASIX) 40 MG tablet Take 1 tablet (40 mg total) by mouth once daily. 30 tablet 0    [DISCONTINUED] JARDIANCE 10 mg tablet Take 10 mg by mouth once daily.      [DISCONTINUED] metoprolol succinate (TOPROL-XL) 50 MG 24 hr tablet Take 1 tablet (50 mg total) by mouth once daily. 30 tablet 0    [DISCONTINUED] pravastatin (PRAVACHOL) 40 MG tablet Take 40 mg by mouth every evening.      [DISCONTINUED] sacubitriL-valsartan (ENTRESTO) 24-26 mg per tablet Take 1 tablet by mouth 2 (two) times daily. 60 tablet 0    [DISCONTINUED] spironolactone (ALDACTONE) 25 MG tablet Take 25 mg by mouth 2 (two) times daily.      famotidine, PF, 20 mg/2 mL Soln Inject 2 mLs (20 mg total) into the vein once daily. (Patient not taking: Reported on 1/22/2025)      [DISCONTINUED] diazePAM (VALIUM) 5 MG tablet Take 1 tablet (5 mg total) by mouth every 12 (twelve) hours as needed for Anxiety. (Patient not taking: Reported on 1/22/2025)      [DISCONTINUED] HYDROcodone-acetaminophen (NORCO) 5-325 mg per tablet Take 1 tablet by mouth every 6 (six) hours as needed for Pain. (Patient not taking: Reported on 1/22/2025)       No facility-administered encounter medications on file as of 1/23/2025.       Allergies:  Review of patient's allergies indicates:   Allergen Reactions    Corticosteroids (glucocorticoids)      Due to cardiac issues    Iodinated contrast media Other (See Comments)       Health Maintenance:  Immunization History   Administered Date(s) Administered    COVID-19 MRNA, LN-S PF (MODERNA HALF 0.25 ML DOSE) 09/10/2021, 05/20/2022    COVID-19, MRNA, LN-S, PF (MODERNA FULL 0.5 ML DOSE) 02/09/2021, 03/15/2021    COVID-19, mRNA, LNP-S, bivalent booster, PF (Moderna Omicron)12 + YEARS 11/16/2022    DTaP (5 Pertussis Antigens) 05/31/2017    Influenza (FLUAD) - Quadrivalent - Adjuvanted - PF *Preferred* (65+) 10/18/2022    Influenza -  "Quadrivalent - PF *Preferred* (6 months and older) 10/17/2019    Influenza - Trivalent - Afluria, Fluzone MDV 10/14/2014    Influenza - Trivalent - Fluzone High Dose - PF (65 years and older) 10/27/2015, 10/28/2016, 10/27/2017, 10/26/2018, 09/24/2020, 11/02/2021    Pneumococcal Conjugate - 13 Valent 07/29/2019    Pneumococcal Polysaccharide - 23 Valent 11/27/2015    Zoster 10/12/2020, 12/11/2020      Health Maintenance   Topic Date Due    Hepatitis C Screening  Never done    DEXA Scan  Never done    TETANUS VACCINE  08/28/2017    Shingles Vaccine (2 of 3) 02/05/2021    RSV Vaccine (Age 60+ and Pregnant patients) (1 - 1-dose 75+ series) Never done    COVID-19 Vaccine (6 - 2024-25 season) 09/01/2024    Lipid Panel  12/16/2029    Influenza Vaccine  Completed    Pneumococcal Vaccines (Age 50+)  Completed        Physical Exam      Vital Signs  Temp: 97.8 °F (36.6 °C)  Temp Source: Oral  Pulse: 80  Resp: 15  SpO2: 96 %  BP: 138/71  Pain Score: 0-No pain  Height and Weight  Height: 5' 8" (172.7 cm)  Weight: 73.9 kg (163 lb)  BSA (Calculated - sq m): 1.88 sq meters  BMI (Calculated): 24.8  Weight in (lb) to have BMI = 25: 164.1]    Physical Exam  Vitals and nursing note reviewed.   Constitutional:       Appearance: Normal appearance. She is well-developed.   HENT:      Head: Normocephalic.      Right Ear: Hearing normal.      Left Ear: Hearing normal.      Nose: Nose normal.   Eyes:      General: Lids are normal.      Conjunctiva/sclera: Conjunctivae normal.   Cardiovascular:      Rate and Rhythm: Normal rate and regular rhythm.      Heart sounds: Normal heart sounds.   Pulmonary:      Effort: Pulmonary effort is normal.      Breath sounds: Normal breath sounds.   Musculoskeletal:         General: Normal range of motion.      Cervical back: Normal range of motion and neck supple.      Right lower leg: No edema.      Left lower leg: No edema.   Skin:     General: Skin is warm and dry.   Neurological:      Mental Status: She " is alert and oriented to person, place, and time.      Gait: Gait is intact.   Psychiatric:         Behavior: Behavior is cooperative.          Laboratory:  CBC:  Recent Labs   Lab 01/16/25  0622 01/29/25  1304   WBC 16.31 H 12.76 H   RBC 3.84 L 4.23   Hemoglobin 12.5 13.1   Hematocrit 42.2 42.4   Platelet Count 217 372   .9 H 100.2 H   MCH 32.6 H 31.0   MCHC 29.6 L 30.9 L     CMP:  Recent Labs   Lab 01/16/25  0622 01/16/25  1215 01/17/25  0947 01/29/25  1304   Glucose 103  --    < > 88   Calcium 8.8  --    < > 9.5   Albumin 3.7  --   --   --    Total Protein 6.9 6.1  --   --    Sodium 141  --    < > 141   Potassium 3.6  --    < > 4.5   CO2 21 L  --    < > 21 L   Chloride 106  --    < > 103   BUN 35 H  --    < > 26 H   Alk Phos 100  --   --   --    ALT 23  --   --   --    AST 30  --   --   --    Bilirubin, Total 0.4  --   --   --     < > = values in this interval not displayed.     LIPIDS:      TSH:      A1C:        Assessment/Plan     Flaquita Sosa is a 79 y.o.female with:     1. Hospital discharge follow-up    2. Atrial fibrillation, unspecified type    3. Acute on chronic systolic heart failure         Total time spent face-to-face and non-face-to-face coordinating care for this encounter was: 20 minutes     Chronic conditions status updated as per HPI.  Other than changes above, cont current medications and maintain follow up with specialists.  Return to clinic in 3 month(s).    Laura Carnes, MARYP  [facility]

## 2025-01-24 LAB
OHS QRS DURATION: 174 MS
OHS QRS DURATION: 184 MS
OHS QTC CALCULATION: 457 MS
OHS QTC CALCULATION: 458 MS

## 2025-01-29 ENCOUNTER — OFFICE VISIT (OUTPATIENT)
Dept: CARDIOLOGY | Facility: CLINIC | Age: 80
End: 2025-01-29
Payer: MEDICARE

## 2025-01-29 VITALS
HEIGHT: 68 IN | BODY MASS INDEX: 23.19 KG/M2 | HEART RATE: 88 BPM | OXYGEN SATURATION: 96 % | SYSTOLIC BLOOD PRESSURE: 132 MMHG | WEIGHT: 153 LBS | DIASTOLIC BLOOD PRESSURE: 70 MMHG

## 2025-01-29 DIAGNOSIS — I27.20 PULMONARY HYPERTENSION: Primary | ICD-10-CM

## 2025-01-29 DIAGNOSIS — I10 HYPERTENSION, UNSPECIFIED TYPE: ICD-10-CM

## 2025-01-29 DIAGNOSIS — I50.23 ACUTE ON CHRONIC SYSTOLIC HEART FAILURE: ICD-10-CM

## 2025-01-29 DIAGNOSIS — I48.11 LONGSTANDING PERSISTENT ATRIAL FIBRILLATION: ICD-10-CM

## 2025-01-29 DIAGNOSIS — E78.5 DYSLIPIDEMIA: ICD-10-CM

## 2025-01-29 PROCEDURE — 99214 OFFICE O/P EST MOD 30 MIN: CPT | Mod: S$PBB,,, | Performed by: NURSE PRACTITIONER

## 2025-01-29 PROCEDURE — 99999 PR PBB SHADOW E&M-EST. PATIENT-LVL IV: CPT | Mod: PBBFAC,,, | Performed by: NURSE PRACTITIONER

## 2025-01-29 PROCEDURE — 93010 ELECTROCARDIOGRAM REPORT: CPT | Mod: S$PBB,,, | Performed by: INTERNAL MEDICINE

## 2025-01-29 PROCEDURE — 93005 ELECTROCARDIOGRAM TRACING: CPT | Mod: PBBFAC | Performed by: INTERNAL MEDICINE

## 2025-01-29 PROCEDURE — 99214 OFFICE O/P EST MOD 30 MIN: CPT | Mod: PBBFAC | Performed by: NURSE PRACTITIONER

## 2025-01-29 RX ORDER — APIXABAN 2.5 MG/1
2.5 TABLET, FILM COATED ORAL 2 TIMES DAILY
Qty: 60 TABLET | Refills: 11 | Status: SHIPPED | OUTPATIENT
Start: 2025-01-29 | End: 2025-01-30 | Stop reason: SDUPTHER

## 2025-01-29 RX ORDER — DIGOXIN 125 MCG
0.12 TABLET ORAL DAILY
Qty: 30 TABLET | Refills: 6 | Status: SHIPPED | OUTPATIENT
Start: 2025-01-29 | End: 2025-01-30 | Stop reason: SDUPTHER

## 2025-01-29 RX ORDER — EMPAGLIFLOZIN 10 MG/1
10 TABLET, FILM COATED ORAL DAILY
Qty: 90 TABLET | Refills: 3 | Status: SHIPPED | OUTPATIENT
Start: 2025-01-29 | End: 2026-01-29

## 2025-01-29 RX ORDER — SACUBITRIL AND VALSARTAN 49; 51 MG/1; MG/1
1 TABLET, FILM COATED ORAL 2 TIMES DAILY
Qty: 180 TABLET | Refills: 3 | Status: SHIPPED | OUTPATIENT
Start: 2025-01-29 | End: 2026-01-29

## 2025-01-29 RX ORDER — SPIRONOLACTONE 25 MG/1
25 TABLET ORAL 2 TIMES DAILY
Qty: 180 TABLET | Refills: 3 | Status: SHIPPED | OUTPATIENT
Start: 2025-01-29 | End: 2025-01-30

## 2025-01-29 RX ORDER — METOPROLOL SUCCINATE 50 MG/1
50 TABLET, EXTENDED RELEASE ORAL DAILY
Qty: 90 TABLET | Refills: 3 | Status: SHIPPED | OUTPATIENT
Start: 2025-01-29 | End: 2026-01-29

## 2025-01-29 RX ORDER — AMIODARONE HYDROCHLORIDE 200 MG/1
200 TABLET ORAL DAILY
Qty: 30 TABLET | Refills: 11 | Status: SHIPPED | OUTPATIENT
Start: 2025-01-29 | End: 2025-01-30 | Stop reason: SDUPTHER

## 2025-01-29 RX ORDER — FUROSEMIDE 40 MG/1
40 TABLET ORAL DAILY
Qty: 90 TABLET | Refills: 3 | Status: SHIPPED | OUTPATIENT
Start: 2025-01-29 | End: 2026-01-29

## 2025-01-29 RX ORDER — ACETAMINOPHEN 500 MG
1 TABLET ORAL DAILY
COMMUNITY

## 2025-01-29 RX ORDER — PRAVASTATIN SODIUM 40 MG/1
40 TABLET ORAL NIGHTLY
Qty: 90 TABLET | Refills: 3 | Status: SHIPPED | OUTPATIENT
Start: 2025-01-29 | End: 2026-01-29

## 2025-01-29 NOTE — PROGRESS NOTES
PCP: Concetta Grady DO    Referring Provider:     Subjective:   Flaquita Sosa is a 79 y.o. female with hx of HFrEF, HTN, Afib, Dyslipidemia who presents for hospital discharge follow up.     Pt was recently hospitalized here at [facility] (1/16/25-1/18/25) for acute on chronic heart failure and Afib RVR. Echo showed EF of 15-20% with severe bi-atrial enlargement suggestive of restrictive cardiomyopathy. Labs for cardiac amyloid were obtained. Pt was diuresed with IV lasix and also required IV dopamine. She had previously been hospitalized at [facility] (discharged from there 1/15/25) where she underwent LHC that revealed normal coronaries. She also underwent cardioversion x 2, converted to NSR however then converted back to Afib. She was referred to Dr. Ulloa, electrophysiologist, in [place].    Today, pt has no cardiac complaints. She states she is feeling well and is ready to go back to work. She is weighing herself daily and has not required extra lasix. EKG today shows Afib with HR 101bpm.       Fhx:  Shx: Never smoker, no ETOH or drug use    EKG   Results for orders placed or performed during the hospital encounter of 01/16/25   EKG 12-lead    Collection Time: 01/16/25  2:47 PM   Result Value Ref Range    QRS Duration 176 ms    [facility] QTC Calculation 458 ms    Narrative    Test Reason : R07.9,    Vent. Rate : 102 BPM     Atrial Rate :    BPM     P-R Int :   0 ms          QRS Dur : 176 ms      QT Int : 384 ms       P-R-T Axes :   0 -65 122 degrees    QTcB Int : 458 ms    Atrial fibrillation with rapid ventricular response  Possible sequence error: V1,V2 omitted  Marked left axis deviation  IV conduction defect  Possible anterior infarct - age undetermined  Left ventricular hypertrophy  Lateral ST-T abnormality may be due to the hypertrophy and/or ischemia  Abnormal ECG    Confirmed by Anabel Joel ([id number]) on 1/18/2025 1:48:00 PM    Referred By: AAAREFERRAL SELF           Confirmed By: Nayet  "Marycruz     ECHO Results for orders placed during the hospital encounter of 01/16/25    Echo    Interpretation Summary    Left Ventricle: The left ventricle is moderately dilated. Mildly increased wall thickness. There is concentric hypertrophy. Septal motion is consistent with bundle branch block. There is severely reduced systolic function with a visually estimated ejection fraction of 15 - 20%. Quantitated ejection fraction is 17%. Unable to assess diastolic function due to atrial fibrillation.    Right Ventricle: Moderate right ventricular enlargement. Systolic function is moderately reduced.    Left Atrium: Left atrium is severely dilated.    Right Atrium: Right atrium is severely dilated.    Aortic Valve: The aortic valve is a trileaflet valve. Mildly calcified cusps. There is mild aortic regurgitation with a centrally directed jet.    Mitral Valve: There is mild bileaflet sclerosis. Mildly thickened leaflets. There is moderate to severe regurgitation with a centrally directed jet.    Tricuspid Valve: There is mild to moderate regurgitation with an eccentrically directed jet.    Pulmonic Valve: There is mild regurgitation.    Pulmonary Artery: The estimated pulmonary artery systolic pressure is 61 mmHg.    IVC/SVC: Elevated venous pressure at 15 mmHg.    Pericardium: There is a small effusion.    [facility] No results found for this or any previous visit.        Lab Results   Component Value Date     01/18/2025    K 3.9 01/18/2025     01/18/2025    CO2 27 01/18/2025    BUN 25 (H) 01/18/2025    CREATININE 1.40 (H) 01/18/2025    CALCIUM 9.1 01/18/2025    ANIONGAP 17 (H) 01/18/2025       No results found for: "CHOL"  No results found for: "HDL"  No results found for: "LDLCALC"  No results found for: "TRIG"  No results found for: "CHOLHDL"    Lab Results   Component Value Date    WBC 16.31 (H) 01/16/2025    HGB 12.5 01/16/2025    HCT 42.2 01/16/2025    .9 (H) 01/16/2025     01/16/2025 "           Current Outpatient Medications:     cholecalciferol, vitamin D3, (VITAMIN D3) 50 mcg (2,000 unit) Cap capsule, Take 1 capsule by mouth once daily., Disp: , Rfl:     docusate sodium (COLACE) 100 MG capsule, Take 1 capsule (100 mg total) by mouth 2 (two) times daily., Disp: , Rfl:     multivit,iron,minerals/lutein (CENTRUM SILVER ULTRA WOMEN'S ORAL), Take by mouth., Disp: , Rfl:     amiodarone (PACERONE) 200 MG Tab, Take 1 tablet (200 mg total) by mouth once daily., Disp: 30 tablet, Rfl: 11    digoxin (LANOXIN) 125 mcg tablet, Take 1 tablet (0.125 mg total) by mouth once daily., Disp: 30 tablet, Rfl: 6    ELIQUIS 2.5 mg Tab, Take 1 tablet (2.5 mg total) by mouth 2 (two) times daily., Disp: 60 tablet, Rfl: 11    famotidine, PF, 20 mg/2 mL Soln, Inject 2 mLs (20 mg total) into the vein once daily. (Patient not taking: Reported on 1/22/2025), Disp: , Rfl:     furosemide (LASIX) 40 MG tablet, Take 1 tablet (40 mg total) by mouth once daily., Disp: 90 tablet, Rfl: 3    JARDIANCE 10 mg tablet, Take 1 tablet (10 mg total) by mouth once daily., Disp: 90 tablet, Rfl: 3    metoprolol succinate (TOPROL-XL) 50 MG 24 hr tablet, Take 1 tablet (50 mg total) by mouth once daily., Disp: 90 tablet, Rfl: 3    pravastatin (PRAVACHOL) 40 MG tablet, Take 1 tablet (40 mg total) by mouth every evening., Disp: 90 tablet, Rfl: 3    sacubitriL-valsartan (ENTRESTO) 49-51 mg per tablet, Take 1 tablet by mouth 2 (two) times daily., Disp: 180 tablet, Rfl: 3    spironolactone (ALDACTONE) 25 MG tablet, Take 1 tablet (25 mg total) by mouth 2 (two) times daily., Disp: 180 tablet, Rfl: 3    Review of Systems   Constitutional:  Negative for chills, diaphoresis, fever and malaise/fatigue.   Respiratory:  Negative for cough and shortness of breath.    Cardiovascular:  Negative for chest pain, palpitations, orthopnea, leg swelling and PND.   Gastrointestinal:  Negative for abdominal pain, nausea and vomiting.   Musculoskeletal:  Negative for  "falls.   Neurological:  Negative for focal weakness and weakness.         Objective:   /70 (BP Location: Left arm, Patient Position: Sitting)   Pulse 88   Ht 5' 8" (1.727 m)   Wt 69.4 kg (153 lb)   SpO2 96%   BMI 23.26 kg/m²     Physical Exam  Constitutional:       General: She is not in acute distress.     Appearance: Normal appearance.   Cardiovascular:      Rate and Rhythm: Tachycardia present. Rhythm irregularly irregular.      Heart sounds: Normal heart sounds.   Pulmonary:      Effort: Pulmonary effort is normal.      Breath sounds: Normal breath sounds. No wheezing or rales.   Musculoskeletal:      Cervical back: Neck supple. No rigidity.      Right lower leg: No edema.      Left lower leg: No edema.   Skin:     General: Skin is warm and dry.   Neurological:      Mental Status: She is alert.           Assessment:     1. Pulmonary hypertension  EKG 12-lead      2. Longstanding persistent atrial fibrillation  sacubitriL-valsartan (ENTRESTO) 49-51 mg per tablet    amiodarone (PACERONE) 200 MG Tab    digoxin (LANOXIN) 125 mcg tablet    ELIQUIS 2.5 mg Tab    furosemide (LASIX) 40 MG tablet    JARDIANCE 10 mg tablet    metoprolol succinate (TOPROL-XL) 50 MG 24 hr tablet    pravastatin (PRAVACHOL) 40 MG tablet    spironolactone (ALDACTONE) 25 MG tablet    CBC Auto Differential    Basic Metabolic Panel    NT-Pro Natriuretic Peptide      3. Acute on chronic systolic heart failure  sacubitriL-valsartan (ENTRESTO) 49-51 mg per tablet    amiodarone (PACERONE) 200 MG Tab    digoxin (LANOXIN) 125 mcg tablet    ELIQUIS 2.5 mg Tab    furosemide (LASIX) 40 MG tablet    JARDIANCE 10 mg tablet    metoprolol succinate (TOPROL-XL) 50 MG 24 hr tablet    pravastatin (PRAVACHOL) 40 MG tablet    spironolactone (ALDACTONE) 25 MG tablet    CBC Auto Differential    Basic Metabolic Panel    NT-Pro Natriuretic Peptide      4. Hypertension, unspecified type        5. Dyslipidemia              Plan:   Acute on chronic heart " failure  - EF 15-20%, BiV failure, bi-atrial enlargement  - NYHA Class II Stage C  - Euvolemic on exam  - Continue lasix 40mg daily, continue daily weights  - /70  - GDMT: INCREASE Entresto to 49-51mg bid - Continue Toprol XL 50mg daily, Aldactone 25mg bid, Jardiance 10mg daily  - labs today      Atrial fibrillation  Longstanding persistent  EKG today shows Afib, HR 101bpm  Pt is asymptomatic  Continue Eliquis, Amiodarone, digoxin and Toprol XL  Keep f/u with Dr. Ulloa (EP) in [place] to discuss ablation    HTN (hypertension)  /70  Increase Entresto to 49-51mg bid - GDMT optimization  Continue Toprol XL and Aldactone    Dyslipidemia  On pravastatin      Follow up with Dr. Robertson in one month

## 2025-01-29 NOTE — ASSESSMENT & PLAN NOTE
- EF 15-20%, BiV failure, bi-atrial enlargement  - NYHA Class II Stage C  - Euvolemic on exam  - Continue lasix 40mg daily, continue daily weights  - /70  - GDMT: INCREASE Entresto to 49-51mg bid - Continue Toprol XL 50mg daily, Aldactone 25mg bid, Jardiance 10mg daily  - labs today

## 2025-01-29 NOTE — ASSESSMENT & PLAN NOTE
Longstanding persistent  EKG today shows Afib, HR 101bpm  Pt is asymptomatic  Continue Eliquis, Amiodarone, digoxin and Toprol XL  Keep f/u with Dr. Ulloa (EP) in [place] to discuss ablation

## 2025-01-30 ENCOUNTER — TELEPHONE (OUTPATIENT)
Dept: CARDIOLOGY | Facility: CLINIC | Age: 80
End: 2025-01-30
Payer: MEDICARE

## 2025-01-30 RX ORDER — DIGOXIN 125 MCG
0.12 TABLET ORAL DAILY
Qty: 90 TABLET | Refills: 3 | Status: SHIPPED | OUTPATIENT
Start: 2025-01-30 | End: 2026-01-30

## 2025-01-30 RX ORDER — SPIRONOLACTONE 25 MG/1
25 TABLET ORAL DAILY
Qty: 90 TABLET | Refills: 3 | Status: SHIPPED | OUTPATIENT
Start: 2025-01-30 | End: 2026-01-30

## 2025-01-30 RX ORDER — AMIODARONE HYDROCHLORIDE 200 MG/1
200 TABLET ORAL DAILY
Qty: 90 TABLET | Refills: 3 | Status: SHIPPED | OUTPATIENT
Start: 2025-01-30 | End: 2026-01-30

## 2025-01-30 RX ORDER — APIXABAN 2.5 MG/1
2.5 TABLET, FILM COATED ORAL 2 TIMES DAILY
Qty: 180 TABLET | Refills: 3 | Status: SHIPPED | OUTPATIENT
Start: 2025-01-30 | End: 2026-01-30

## 2025-01-30 NOTE — TELEPHONE ENCOUNTER
Spoke with the patient on today to discuss lab results and to instruct her to take her Aldactone (Spironolactone) once a day instead of twice a day.     Patient verbalized understanding.

## 2025-01-30 NOTE — TELEPHONE ENCOUNTER
Called the patient on today to discuss labs and medication changes, but it went to her VM.    Left the patient a message to return my call when available.----- Message from Cece sent at 1/30/2025  3:09 PM CST -----  Regarding: Return call  Who Called: Flaquita Sosa    Patient is returning phone call    Who Left Message for Patient: Jonelle Bah   Does the patient know what this is regarding?: Lab results      Preferred Method of Contact: Phone Call  Patient's Preferred Phone Number on File: [phone]   Best Call Back Number, if different:  Additional Information: would like to also speak to the NP about medication she is taking.

## 2025-01-30 NOTE — TELEPHONE ENCOUNTER
----- Message from ALVARO Corley sent at 1/30/2025  8:26 AM CST -----  Please call and let her know that her kidney function is slightly worse.  Please instruct her to only take her Aldactone (spironolactone) once a day instead of twice a day.  Let her know her other labs have improved.  Thank you

## 2025-01-31 LAB
OHS QRS DURATION: 166 MS
OHS QTC CALCULATION: 443 MS

## 2025-01-31 NOTE — ED PROVIDER NOTES
Encounter Date: 1/16/2025       History     Chief Complaint   Patient presents with    Chest Pain     80 Y/O FEMALE WITH CHF.  PRESENTS COMPLAINING OF INTERMITTENT CHEST PAIN AND SHORTNESS OF BREATH.  SHE WAS DISCHARGED FROM [facility] YESTERDAY.  SHE UNDERWENT LEFT HEART CATH WITHOUT INTERVENTION.  SHE IS DISCOMFORT IS MODERATE AND INTERMITTENT.  SHE NOTES NO PARTICULAR REMITTING OR EXACERBATING FACTORS.          Review of patient's allergies indicates:   Allergen Reactions    Corticosteroids (glucocorticoids)      Due to cardiac issues    Iodinated contrast media Other (See Comments)     Past Medical History:   Diagnosis Date    Hyperlipidemia     Hypertension      Past Surgical History:   Procedure Laterality Date    HYSTERECTOMY      OOPHORECTOMY       Family History   Family history unknown: Yes     Social History     Tobacco Use    Smoking status: Never     Passive exposure: Never    Smokeless tobacco: Never   Substance Use Topics    Alcohol use: Never    Drug use: Never     Review of Systems   All other systems reviewed and are negative.      Physical Exam     Initial Vitals   BP Pulse Resp Temp SpO2   01/16/25 0557 01/16/25 0556 01/16/25 0556 01/16/25 0556 01/16/25 0556   (!) 141/84 (!) 128 15 97.6 °F (36.4 °C) (!) 93 %      MAP       --                Physical Exam    Nursing note and vitals reviewed.  Constitutional: She appears well-developed and well-nourished.   HENT:   Head: Normocephalic and atraumatic.   Nose: Nose normal. Mouth/Throat: Oropharynx is clear and moist.   Eyes: Conjunctivae and EOM are normal. Pupils are equal, round, and reactive to light.   Neck: Neck supple.   Normal range of motion.  Cardiovascular:  Normal rate, regular rhythm and normal heart sounds.           Pulmonary/Chest: Breath sounds normal.   Abdominal: Abdomen is soft. Bowel sounds are normal.   Musculoskeletal:         General: Normal range of motion.      Cervical back: Normal range of motion and neck supple.      Neurological: She is alert and oriented to person, place, and time. She has normal strength. GCS score is 15. GCS eye subscore is 4. GCS verbal subscore is 5. GCS motor subscore is 6.   Skin: Skin is warm and dry.         Medical Screening Exam   See Full Note    ED Course   Procedures  Labs Reviewed   COMPREHENSIVE METABOLIC PANEL - Abnormal       Result Value    Sodium 141      Potassium 3.6      Chloride 106      CO2 21 (*)     Anion Gap 18 (*)     Glucose 103      BUN 35 (*)     Creatinine 1.74 (*)     BUN/Creatinine Ratio 20      Calcium 8.8      Total Protein 6.9      Albumin 3.7      Globulin 3.2      A/G Ratio 1.2      Bilirubin, Total 0.4      Alk Phos 100      ALT 23      AST 30      eGFR 30 (*)    NT-PRO NATRIURETIC PEPTIDE - Abnormal    ProBNP 17,240 (*)    TROPONIN I - Abnormal    Troponin I High Sensitivity 30.3 (*)    PROTIME-INR - Abnormal    PT 16.6 (*)     INR 1.36     CBC WITH DIFFERENTIAL - Abnormal    WBC 16.31 (*)     RBC 3.84 (*)     Hemoglobin 12.5      Hematocrit 42.2      .9 (*)     MCH 32.6 (*)     MCHC 29.6 (*)     RDW 13.3      Platelet Count 217      MPV 11.3      Neutrophils % 79.2 (*)     Lymphocytes % 12.6 (*)     Monocytes % 6.9 (*)     Eosinophils % 0.4 (*)     Basophils % 0.4      Immature Granulocytes % 0.5 (*)     nRBC, Auto 0.0      Neutrophils, Abs 12.93 (*)     Lymphocytes, Absolute 2.05      Monocytes, Absolute 1.13 (*)     Eosinophils, Absolute 0.06      Basophils, Absolute 0.06      Immature Granulocytes, Absolute 0.08 (*)     nRBC, Absolute 0.00      Diff Type Scan Smear     CBC MORPHOLOGY - Abnormal    Platelet Morphology Few Large Platelets (*)     Macrocytosis 1+      Polychromasia Few     TROPONIN I - Abnormal    Troponin I High Sensitivity 28.6 (*)    TROPONIN I - Abnormal    Troponin I High Sensitivity 39.5 (*)    MAGNESIUM - Normal    Magnesium 2.1     APTT - Normal    PTT 28.3     CBC W/ AUTO DIFFERENTIAL    Narrative:     The following orders were  created for panel order CBC auto differential.  Procedure                               Abnormality         Status                     ---------                               -----------         ------                     CBC with Differential[[id number]]       Abnormal            Final result                 Please view results for these tests on the individual orders.        ECG Results              EKG 12-lead (Final result)        Collection Time Result Time QRS Duration [facility] QTC Calculation    01/16/25 10:42:06 01/24/25 13:55:53 184 457                     Final result by Interface, Lab In [facility] (01/24/25 13:55:57)                   Narrative:    Test Reason : R07.9,    Vent. Rate : 114 BPM     Atrial Rate :    BPM     P-R Int :   0 ms          QRS Dur : 184 ms      QT Int : 362 ms       P-R-T Axes :   0 -69 115 degrees    QTcB Int : 457 ms    Atrial fibrillation with rapid ventricular response  Possible sequence error: V1,V2 omitted  Marked left axis deviation  IV conduction defect  Possible anterior infarct - age undetermined  Left ventricular hypertrophy  Lateral ST-T abnormality may be due to the hypertrophy and/or ischemia  Abnormal ECG    Confirmed by Yosi Robertson ([id number]) on 1/24/2025 1:55:49 PM    Referred By: AAAREFERRAL SELF           Confirmed By: Yosi Robertson                                     EKG 12-lead (Final result)        Collection Time Result Time QRS Duration OHS QTC Calculation    01/16/25 09:15:55 01/24/25 13:56:06 174 458                     Final result by Interface, Lab In [facility] (01/24/25 13:56:14)                   Narrative:    Test Reason : I48.91,    Vent. Rate :  93 BPM     Atrial Rate :    BPM     P-R Int :   0 ms          QRS Dur : 174 ms      QT Int : 400 ms       P-R-T Axes :   0 -59 130 degrees    QTcB Int : 458 ms    Atrial fibrillation  Marked left axis deviation  IV conduction defect  Possible anterior infarct - age undetermined  Left ventricular  hypertrophy  Lateral ST-T abnormality may be due to the hypertrophy and/or ischemia  Abnormal ECG    Confirmed by Yosi Robertson ([id number]) on 1/24/2025 1:56:05 PM    Referred By: AAAREFERRAL SELF           Confirmed By: Yosi Robertson                                     EKG 12-lead (Final result)        Collection Time Result Time QRS Duration OHS QTC Calculation    01/16/25 05:54:49 01/16/25 09:08:36 190 465                     Final result by Interface, Lab In [facility] (01/16/25 09:08:39)                   Narrative:    Test Reason : R07.9,    Vent. Rate : 128 BPM     Atrial Rate :    BPM     P-R Int :   0 ms          QRS Dur : 190 ms      QT Int : 346 ms       P-R-T Axes :   0 -69 120 degrees    QTcB Int : 465 ms    Atrial flutter with rapid ventricular response with 3:1 A-V block  Marked left axis deviation  IV conduction defect  Possible anterior infarct - age undetermined  Left ventricular hypertrophy  Lateral ST-T abnormality may be due to the hypertrophy and/or ischemia  Abnormal ECG    Confirmed by Yosi Robertson ([id number]) on 1/16/2025 9:08:34 AM    Referred By: RUBÉNREFERRAL SELF           Confirmed By: Yosi Robertson                                  Imaging Results    None          Medications   metoprolol injection 5 mg (5 mg Intravenous Given 1/16/25 0617)   metoprolol injection 5 mg (5 mg Intravenous Given 1/16/25 0630)   amiodarone tablet 200 mg (200 mg Oral Given 1/16/25 0744)   metoprolol succinate (TOPROL-XL) 24 hr tablet 100 mg (100 mg Oral Given 1/16/25 0744)   diltiaZEM injection 10 mg (10 mg Intravenous Given 1/16/25 0900)   digoxin injection 500 mcg (500 mcg Intravenous Given 1/16/25 1217)     Medical Decision Making  CHEST PAIN.  CARDIAC HISTORY.  RECENT WORK-UP AT ANOTHER FACILITY.     DDX:  CARDIAC VS GI VS OTHER    OBSERVATION    Amount and/or Complexity of Data Reviewed  Labs: ordered. Decision-making details documented in ED Course.  ECG/medicine tests: ordered. Decision-making details  documented in ED Course.  Discussion of management or test interpretation with external provider(s): DISCUSSED WITH ADMITTING SERVICE.      Risk  Prescription drug management.                                      Clinical Impression:   Final diagnoses:  [R07.9] Chest pain  [I48.91] Atrial fibrillation        ED Disposition Condition    Observation                 Natanael Bobo MD  01/31/25 1002

## 2025-02-03 ENCOUNTER — TELEPHONE (OUTPATIENT)
Dept: CARDIOLOGY | Facility: CLINIC | Age: 80
End: 2025-02-03
Payer: MEDICARE

## 2025-02-03 RX ORDER — CARVEDILOL 25 MG/1
TABLET ORAL
COMMUNITY
Start: 2025-01-27

## 2025-02-03 NOTE — TELEPHONE ENCOUNTER
--  --Spoke to Nicol (pharmacist) at [facility] verifying ok for pt. To be on digoxin and amiodarone, ok for pt. To be on spironolactone and entresto per Dr. Robertson. Nicol voiced understanding.- Message from Med Assistant Garcia sent at 2/3/2025 10:44 AM CST -----  Regarding: FW: RX    ----- Message -----  From: Cece Beverly  Sent: 2/3/2025  10:29 AM CST  To: Francois MANCINI Staff  Subject: RX                                               Who Called: Lew from [facility]     Caller is requesting assistance/information from provider's office.    sacubitriL-valsartan (ENTRESTO) 49-51 mg per tablet along with hydrochlorothiacide Pharmacist wants to know if the patient should be taking both or just one. Response can be faxed [phone] or phone call.              Preferred Method of Contact: Phone Call    Best Call Back Number, if different: [phone]  Additional Information:

## 2025-02-06 ENCOUNTER — TELEPHONE (OUTPATIENT)
Dept: CARDIOLOGY | Facility: CLINIC | Age: 80
End: 2025-02-06
Payer: MEDICARE

## 2025-02-06 NOTE — TELEPHONE ENCOUNTER
Returned call to pharmacy, pt did not have hydrochlorthiazide on medication list, Francois was in office and spoke with pharmacist.    ----- Message from Cece sent at 2/3/2025 10:25 AM CST -----  Regarding: RX  Who Called: Lew from [facility]     Caller is requesting assistance/information from provider's office.    sacubitriL-valsartan (ENTRESTO) 49-51 mg per tablet along with hydrochlorothiacide Pharmacist wants to know if the patient should be taking both or just one. Response can be faxed [phone] or phone call.              Preferred Method of Contact: Phone Call    Best Call Back Number, if different: [phone]  Additional Information:

## 2025-02-18 DIAGNOSIS — I50.23 ACUTE ON CHRONIC SYSTOLIC HEART FAILURE: ICD-10-CM

## 2025-02-18 DIAGNOSIS — I48.11 LONGSTANDING PERSISTENT ATRIAL FIBRILLATION: ICD-10-CM

## 2025-02-18 RX ORDER — DIGOXIN 125 MCG
0.12 TABLET ORAL DAILY
Qty: 30 TABLET | Refills: 1 | Status: SHIPPED | OUTPATIENT
Start: 2025-02-18 | End: 2026-02-18

## 2025-02-26 PROCEDURE — 85610 PROTHROMBIN TIME: CPT

## 2025-02-26 PROCEDURE — 85730 THROMBOPLASTIN TIME PARTIAL: CPT

## 2025-03-03 DIAGNOSIS — Z95.0 PRESENCE OF CARDIAC PACEMAKER: Primary | ICD-10-CM

## 2025-03-06 ENCOUNTER — HOSPITAL ENCOUNTER (OUTPATIENT)
Facility: HOSPITAL | Age: 80
Discharge: HOME | End: 2025-03-07
Attending: INTERNAL MEDICINE | Admitting: INTERNAL MEDICINE
Payer: MEDICARE

## 2025-03-06 DIAGNOSIS — Z95.0 PACEMAKER: ICD-10-CM

## 2025-03-06 DIAGNOSIS — I50.20 SYSTOLIC HEART FAILURE: ICD-10-CM

## 2025-03-06 DIAGNOSIS — T78.40XA ALLERGIC REACTION, INITIAL ENCOUNTER: Primary | ICD-10-CM

## 2025-03-06 DIAGNOSIS — R07.9 CHEST PAIN: ICD-10-CM

## 2025-03-06 DIAGNOSIS — I48.91 ATRIAL FIBRILLATION: ICD-10-CM

## 2025-03-06 PROBLEM — G47.00 INSOMNIA: Status: ACTIVE | Noted: 2025-03-06

## 2025-03-06 LAB
ALBUMIN SERPL BCP-MCNC: 3.7 G/DL (ref 3.4–4.8)
ALBUMIN/GLOB SERPL: 1.1 {RATIO}
ALP SERPL-CCNC: 105 U/L (ref 40–150)
ALT SERPL W P-5'-P-CCNC: 19 U/L
ANION GAP SERPL CALCULATED.3IONS-SCNC: 14 MMOL/L (ref 7–16)
AORTIC ROOT ANNULUS: 3.03 CM
AORTIC VALVE CUSP SEPERATION: 2.45 CM
APICAL FOUR CHAMBER EJECTION FRACTION: 48 %
AST SERPL W P-5'-P-CCNC: 25 U/L (ref 5–34)
AV INDEX (PROSTH): 0.81
AV MEAN GRADIENT: 3 MMHG
AV PEAK GRADIENT: 6 MMHG
AV REGURGITATION PRESSURE HALF TIME: 544 MS
AV VALVE AREA BY VELOCITY RATIO: 3.8 CM²
AV VALVE AREA: 3.6 CM²
AV VELOCITY RATIO: 0.83
BASOPHILS # BLD AUTO: 0.03 K/UL (ref 0–0.2)
BASOPHILS NFR BLD AUTO: 0.3 % (ref 0–1)
BILIRUB SERPL-MCNC: 0.3 MG/DL
BUN SERPL-MCNC: 23 MG/DL (ref 10–20)
BUN/CREAT SERPL: 12 (ref 6–20)
CALCIUM SERPL-MCNC: 9.3 MG/DL (ref 8.4–10.2)
CHLORIDE SERPL-SCNC: 104 MMOL/L (ref 98–107)
CO2 SERPL-SCNC: 29 MMOL/L (ref 23–31)
CREAT SERPL-MCNC: 1.94 MG/DL (ref 0.55–1.02)
CV ECHO LV RWT: 0.45 CM
DIFFERENTIAL METHOD BLD: ABNORMAL
DOP CALC AO PEAK VEL: 1.2 M/S
DOP CALC AO VTI: 23.2 CM
DOP CALC LVOT AREA: 4.5 CM2
DOP CALC LVOT DIAMETER: 2.4 CM
DOP CALC LVOT PEAK VEL: 1 M/S
DOP CALC LVOT STROKE VOLUME: 84.6 CM3
DOP CALCLVOT PEAK VEL VTI: 18.7 CM
E WAVE DECELERATION TIME: 213 MSEC
E/A RATIO: 1.61
E/E' RATIO: 15 M/S
ECHO LV POSTERIOR WALL: 1.1 CM (ref 0.6–1.1)
EGFR (NO RACE VARIABLE) (RUSH/TITUS): 26 ML/MIN/1.73M2
EJECTION FRACTION: 40 %
EOSINOPHIL # BLD AUTO: 0.16 K/UL (ref 0–0.5)
EOSINOPHIL NFR BLD AUTO: 1.5 % (ref 1–4)
ERYTHROCYTE [DISTWIDTH] IN BLOOD BY AUTOMATED COUNT: 13.2 % (ref 11.5–14.5)
FRACTIONAL SHORTENING: 12.2 % (ref 28–44)
GLOBULIN SER-MCNC: 3.3 G/DL (ref 2–4)
GLUCOSE SERPL-MCNC: 140 MG/DL (ref 82–115)
HCT VFR BLD AUTO: 37.7 % (ref 38–47)
HGB BLD-MCNC: 12 G/DL (ref 12–16)
IMM GRANULOCYTES # BLD AUTO: 0.03 K/UL (ref 0–0.04)
IMM GRANULOCYTES NFR BLD: 0.3 % (ref 0–0.4)
INTERVENTRICULAR SEPTUM: 1.2 CM (ref 0.6–1.1)
IVC DIAMETER: 0.79 CM
LEFT ATRIUM AREA SYSTOLIC (APICAL 4 CHAMBER): 14.31 CM2
LEFT ATRIUM SIZE: 3.7 CM
LEFT INTERNAL DIMENSION IN SYSTOLE: 4.3 CM (ref 2.1–4)
LEFT VENTRICLE DIASTOLIC VOLUME: 112 ML
LEFT VENTRICLE END DIASTOLIC VOLUME APICAL 4 CHAMBER: 72.04 ML
LEFT VENTRICLE END SYSTOLIC VOLUME APICAL 4 CHAMBER: 31.93 ML
LEFT VENTRICLE SYSTOLIC VOLUME: 82 ML
LEFT VENTRICULAR INTERNAL DIMENSION IN DIASTOLE: 4.9 CM (ref 3.5–6)
LEFT VENTRICULAR MASS: 213.3 G
LV LATERAL E/E' RATIO: 11.5 M/S
LV SEPTAL E/E' RATIO: 23 M/S
LVED V (TEICH): 111.76 ML
LVES V (TEICH): 81.8 ML
LVOT MG: 2.16 MMHG
LVOT MV: 0.69 CM/S
LYMPHOCYTES # BLD AUTO: 1.73 K/UL (ref 1–4.8)
LYMPHOCYTES NFR BLD AUTO: 16.5 % (ref 27–41)
MCH RBC QN AUTO: 31.3 PG (ref 27–31)
MCHC RBC AUTO-ENTMCNC: 31.8 G/DL (ref 32–36)
MCV RBC AUTO: 98.2 FL (ref 80–96)
MONOCYTES # BLD AUTO: 0.96 K/UL (ref 0–0.8)
MONOCYTES NFR BLD AUTO: 9.1 % (ref 2–6)
MPC BLD CALC-MCNC: 11.6 FL (ref 9.4–12.4)
MV PEAK A VEL: 0.57 M/S
MV PEAK E VEL: 0.92 M/S
NEUTROPHILS # BLD AUTO: 7.6 K/UL (ref 1.8–7.7)
NEUTROPHILS NFR BLD AUTO: 72.3 % (ref 53–65)
NRBC # BLD AUTO: 0 X10E3/UL
NRBC, AUTO (.00): 0 %
OHS CV RV/LV RATIO: 0.82 CM
PISA AR MAX VEL: 2.3 M/S
PISA TR MAX VEL: 2.8 M/S
PLATELET # BLD AUTO: 253 K/UL (ref 150–400)
POTASSIUM SERPL-SCNC: 4.8 MMOL/L (ref 3.5–5.1)
PROT SERPL-MCNC: 7 G/DL (ref 5.8–7.6)
PV PEAK GRADIENT: 7 MMHG
PV PEAK VELOCITY: 1.35 M/S
RA MAJOR: 4.33 CM
RA PRESSURE ESTIMATED: 3 MMHG
RBC # BLD AUTO: 3.84 M/UL (ref 4.2–5.4)
RIGHT VENTRICLE DIASTOLIC BASEL DIMENSION: 4 CM
RIGHT VENTRICLE DIASTOLIC LENGTH: 5 CM
RIGHT VENTRICLE DIASTOLIC MID DIMENSION: 3.1 CM
RIGHT VENTRICULAR LENGTH IN DIASTOLE (APICAL 4-CHAMBER VIEW): 4.95 CM
RV MID DIAMA: 3.1 CM
RV TB RVSP: 6 MMHG
SODIUM SERPL-SCNC: 142 MMOL/L (ref 136–145)
TDI LATERAL: 0.08 M/S
TDI SEPTAL: 0.04 M/S
TDI: 0.06 M/S
TR MAX PG: 32 MMHG
TRICUSPID ANNULAR PLANE SYSTOLIC EXCURSION: 2.19 CM
TV REST PULMONARY ARTERY PRESSURE: 34 MMHG
WBC # BLD AUTO: 10.51 K/UL (ref 4.5–11)

## 2025-03-06 PROCEDURE — 96375 TX/PRO/DX INJ NEW DRUG ADDON: CPT

## 2025-03-06 PROCEDURE — G0379 DIRECT REFER HOSPITAL OBSERV: HCPCS

## 2025-03-06 PROCEDURE — 36415 COLL VENOUS BLD VENIPUNCTURE: CPT | Performed by: NURSE PRACTITIONER

## 2025-03-06 PROCEDURE — G0378 HOSPITAL OBSERVATION PER HR: HCPCS

## 2025-03-06 PROCEDURE — 25000003 PHARM REV CODE 250: Performed by: NURSE PRACTITIONER

## 2025-03-06 PROCEDURE — 63600175 PHARM REV CODE 636 W HCPCS: Mod: JZ,TB | Performed by: NURSE PRACTITIONER

## 2025-03-06 PROCEDURE — 85025 COMPLETE CBC W/AUTO DIFF WBC: CPT | Performed by: NURSE PRACTITIONER

## 2025-03-06 PROCEDURE — 80053 COMPREHEN METABOLIC PANEL: CPT | Performed by: NURSE PRACTITIONER

## 2025-03-06 PROCEDURE — 96374 THER/PROPH/DIAG INJ IV PUSH: CPT

## 2025-03-06 RX ORDER — SPIRONOLACTONE 25 MG/1
25 TABLET ORAL DAILY
Status: DISCONTINUED | OUTPATIENT
Start: 2025-03-06 | End: 2025-03-07 | Stop reason: HOSPADM

## 2025-03-06 RX ORDER — ALUMINUM HYDROXIDE, MAGNESIUM HYDROXIDE, AND SIMETHICONE 1200; 120; 1200 MG/30ML; MG/30ML; MG/30ML
30 SUSPENSION ORAL 4 TIMES DAILY PRN
Status: DISCONTINUED | OUTPATIENT
Start: 2025-03-06 | End: 2025-03-07 | Stop reason: HOSPADM

## 2025-03-06 RX ORDER — SODIUM CHLORIDE 0.9 % (FLUSH) 0.9 %
10 SYRINGE (ML) INJECTION EVERY 12 HOURS PRN
Status: DISCONTINUED | OUTPATIENT
Start: 2025-03-06 | End: 2025-03-07 | Stop reason: HOSPADM

## 2025-03-06 RX ORDER — METOPROLOL SUCCINATE 25 MG/1
50 TABLET, EXTENDED RELEASE ORAL DAILY
Status: DISCONTINUED | OUTPATIENT
Start: 2025-03-06 | End: 2025-03-07 | Stop reason: HOSPADM

## 2025-03-06 RX ORDER — FAMOTIDINE 20 MG/1
20 TABLET, FILM COATED ORAL EVERY OTHER DAY
Status: DISCONTINUED | OUTPATIENT
Start: 2025-03-07 | End: 2025-03-07 | Stop reason: HOSPADM

## 2025-03-06 RX ORDER — FAMOTIDINE 20 MG/1
20 TABLET, FILM COATED ORAL 2 TIMES DAILY
Status: DISCONTINUED | OUTPATIENT
Start: 2025-03-06 | End: 2025-03-06

## 2025-03-06 RX ORDER — PRAVASTATIN SODIUM 40 MG/1
40 TABLET ORAL NIGHTLY
Status: DISCONTINUED | OUTPATIENT
Start: 2025-03-06 | End: 2025-03-07 | Stop reason: HOSPADM

## 2025-03-06 RX ORDER — METHYLPREDNISOLONE SOD SUCC 125 MG
125 VIAL (EA) INJECTION ONCE
Status: COMPLETED | OUTPATIENT
Start: 2025-03-06 | End: 2025-03-06

## 2025-03-06 RX ORDER — DOCUSATE SODIUM 100 MG/1
100 CAPSULE, LIQUID FILLED ORAL 2 TIMES DAILY
Status: DISCONTINUED | OUTPATIENT
Start: 2025-03-06 | End: 2025-03-07 | Stop reason: HOSPADM

## 2025-03-06 RX ORDER — FUROSEMIDE 40 MG/1
40 TABLET ORAL DAILY
Status: DISCONTINUED | OUTPATIENT
Start: 2025-03-06 | End: 2025-03-07 | Stop reason: HOSPADM

## 2025-03-06 RX ORDER — DIPHENHYDRAMINE HYDROCHLORIDE 50 MG/ML
25 INJECTION, SOLUTION INTRAMUSCULAR; INTRAVENOUS ONCE
Status: COMPLETED | OUTPATIENT
Start: 2025-03-06 | End: 2025-03-06

## 2025-03-06 RX ORDER — DIGOXIN 125 MCG
0.12 TABLET ORAL DAILY
Status: DISCONTINUED | OUTPATIENT
Start: 2025-03-07 | End: 2025-03-07 | Stop reason: HOSPADM

## 2025-03-06 RX ORDER — AMIODARONE HYDROCHLORIDE 200 MG/1
200 TABLET ORAL DAILY
Status: DISCONTINUED | OUTPATIENT
Start: 2025-03-07 | End: 2025-03-07 | Stop reason: HOSPADM

## 2025-03-06 RX ORDER — DIPHENHYDRAMINE HYDROCHLORIDE 50 MG/ML
50 INJECTION, SOLUTION INTRAMUSCULAR; INTRAVENOUS 2 TIMES DAILY
Status: DISCONTINUED | OUTPATIENT
Start: 2025-03-07 | End: 2025-03-07 | Stop reason: HOSPADM

## 2025-03-06 RX ORDER — NALOXONE HCL 0.4 MG/ML
0.02 VIAL (ML) INJECTION
Status: DISCONTINUED | OUTPATIENT
Start: 2025-03-06 | End: 2025-03-07 | Stop reason: HOSPADM

## 2025-03-06 RX ORDER — ACETAMINOPHEN 325 MG/1
650 TABLET ORAL EVERY 8 HOURS PRN
Status: DISCONTINUED | OUTPATIENT
Start: 2025-03-06 | End: 2025-03-07 | Stop reason: HOSPADM

## 2025-03-06 RX ORDER — SIMETHICONE 80 MG
1 TABLET,CHEWABLE ORAL 4 TIMES DAILY PRN
Status: DISCONTINUED | OUTPATIENT
Start: 2025-03-06 | End: 2025-03-07 | Stop reason: HOSPADM

## 2025-03-06 RX ADMIN — APIXABAN 2.5 MG: 2.5 TABLET, FILM COATED ORAL at 09:03

## 2025-03-06 RX ADMIN — METHYLPREDNISOLONE SODIUM SUCCINATE 125 MG: 125 INJECTION, POWDER, FOR SOLUTION INTRAMUSCULAR; INTRAVENOUS at 03:03

## 2025-03-06 RX ADMIN — SACUBITRIL AND VALSARTAN 1 TABLET: 49; 51 TABLET, FILM COATED ORAL at 09:03

## 2025-03-06 RX ADMIN — TRAZODONE HYDROCHLORIDE 25 MG: 50 TABLET ORAL at 09:03

## 2025-03-06 RX ADMIN — PRAVASTATIN SODIUM 40 MG: 40 TABLET ORAL at 09:03

## 2025-03-06 RX ADMIN — DOCUSATE SODIUM 100 MG: 100 CAPSULE, LIQUID FILLED ORAL at 09:03

## 2025-03-06 RX ADMIN — DIPHENHYDRAMINE HYDROCHLORIDE 25 MG: 50 INJECTION INTRAMUSCULAR; INTRAVENOUS at 03:03

## 2025-03-06 NOTE — HPI
Ms. Sosa is a 79 Y O female with PMH of chronic HFrEF, Afib/flutter, SSS, who presented as a direct admit under cardiology service for rash after she recently underwent cardiac pacemaker placement.        OP visit with Swetha Parrish NP on 1/29/2025  Pt was recently hospitalized here at [facility] (1/16/25-1/18/25) for acute on chronic heart failure and Afib RVR. Echo showed EF of 15-20% with severe bi-atrial enlargement suggestive of restrictive cardiomyopathy. Labs for cardiac amyloid were obtained. Pt was diuresed with IV lasix and also required IV dopamine. She had previously been hospitalized at [facility] (discharged from there 1/15/25) where she underwent LHC that revealed normal coronaries. She also underwent cardioversion x 2, converted to NSR however then converted back to Afib. She was referred to Dr. Ulloa, electrophysiologist, in [place].

## 2025-03-07 ENCOUNTER — HOSPITAL ENCOUNTER (OUTPATIENT)
Dept: CARDIOLOGY | Facility: HOSPITAL | Age: 80
Discharge: HOME | End: 2025-03-07
Attending: STUDENT IN AN ORGANIZED HEALTH CARE EDUCATION/TRAINING PROGRAM | Admitting: INTERNAL MEDICINE
Payer: MEDICARE

## 2025-03-07 ENCOUNTER — DOCUMENTATION ONLY (OUTPATIENT)
Dept: CARDIOLOGY | Facility: CLINIC | Age: 80
End: 2025-03-07
Payer: MEDICARE

## 2025-03-07 VITALS
WEIGHT: 159 LBS | OXYGEN SATURATION: 94 % | RESPIRATION RATE: 17 BRPM | SYSTOLIC BLOOD PRESSURE: 135 MMHG | DIASTOLIC BLOOD PRESSURE: 64 MMHG | HEART RATE: 71 BPM | TEMPERATURE: 98 F | HEIGHT: 68 IN | BODY MASS INDEX: 24.1 KG/M2

## 2025-03-07 DIAGNOSIS — Z95.0 PRESENCE OF CARDIAC PACEMAKER: ICD-10-CM

## 2025-03-07 PROBLEM — L23.9 ALLERGIC DERMATITIS: Status: ACTIVE | Noted: 2025-03-07

## 2025-03-07 PROBLEM — T78.40XA ALLERGIC REACTION: Status: RESOLVED | Noted: 2025-03-06 | Resolved: 2025-03-07

## 2025-03-07 PROCEDURE — G0378 HOSPITAL OBSERVATION PER HR: HCPCS

## 2025-03-07 PROCEDURE — 25000003 PHARM REV CODE 250: Performed by: NURSE PRACTITIONER

## 2025-03-07 PROCEDURE — 93284 PRGRMG EVAL IMPLANTABLE DFB: CPT

## 2025-03-07 PROCEDURE — 25000003 PHARM REV CODE 250: Performed by: INTERNAL MEDICINE

## 2025-03-07 RX ADMIN — ACETAMINOPHEN 650 MG: 325 TABLET ORAL at 10:03

## 2025-03-07 RX ADMIN — DIGOXIN 0.12 MG: 125 TABLET ORAL at 10:03

## 2025-03-07 RX ADMIN — THERA TABS 1 TABLET: TAB at 10:03

## 2025-03-07 RX ADMIN — DOCUSATE SODIUM 100 MG: 100 CAPSULE, LIQUID FILLED ORAL at 10:03

## 2025-03-07 RX ADMIN — EMPAGLIFLOZIN 10 MG: 10 TABLET, FILM COATED ORAL at 10:03

## 2025-03-07 RX ADMIN — APIXABAN 2.5 MG: 2.5 TABLET, FILM COATED ORAL at 10:03

## 2025-03-07 RX ADMIN — FUROSEMIDE 40 MG: 40 TABLET ORAL at 10:03

## 2025-03-07 RX ADMIN — AMIODARONE HYDROCHLORIDE 200 MG: 200 TABLET ORAL at 10:03

## 2025-03-07 RX ADMIN — SPIRONOLACTONE 25 MG: 25 TABLET ORAL at 10:03

## 2025-03-07 RX ADMIN — SACUBITRIL AND VALSARTAN 1 TABLET: 49; 51 TABLET, FILM COATED ORAL at 10:03

## 2025-03-07 RX ADMIN — FAMOTIDINE 20 MG: 20 TABLET, FILM COATED ORAL at 10:03

## 2025-03-07 RX ADMIN — METOPROLOL SUCCINATE 50 MG: 25 TABLET, EXTENDED RELEASE ORAL at 10:03

## 2025-03-07 NOTE — PROGRESS NOTES
[facility] - [address] [place] [address]  Wound Care    Patient Name:  Flaquita Sosa   MRN:  [id number]  Date: 3/7/2025  Diagnosis: Allergic reaction    History:     Past Medical History:   Diagnosis Date    Hyperlipidemia     Hypertension        Social History[1]    Precautions:     Allergies as of 03/06/2025 - Reviewed 03/06/2025   Allergen Reaction Noted    Vancomycin analogues Rash 03/06/2025    Iodinated contrast media Other (See Comments) 12/23/2023       [facility] Assessment Details/Treatment     Narrative: Seen patient for initiation of preventative skin care measures    Patient up in chair,Alert. States skin is okay.  Jean Claude score 22    Consult wound care for any skin issues         03/07/2025         [1]   Social History  Socioeconomic History    Marital status:    Tobacco Use    Smoking status: Never     Passive exposure: Never    Smokeless tobacco: Never   Substance and Sexual Activity    Alcohol use: Never    Drug use: Never    Sexual activity: Not Currently     Partners: Male     Social Drivers of Health     Financial Resource Strain: Low Risk  (1/18/2025)    Overall Financial Resource Strain (CARDIA)     Difficulty of Paying Living Expenses: Not hard at all   Food Insecurity: No Food Insecurity (1/18/2025)    Hunger Vital Sign     Worried About Running Out of Food in the Last Year: Never true     Ran Out of Food in the Last Year: Never true   Transportation Needs: No Transportation Needs (1/18/2025)    TRANSPORTATION NEEDS     Transportation : No   Physical Activity: Insufficiently Active (1/18/2025)    Exercise Vital Sign     Days of Exercise per Week: 5 days     Minutes of Exercise per Session: 20 min   Stress: No Stress Concern Present (2/28/2025)    Received from [facility]    [employer] - Occupational Stress Questionnaire     Feeling of Stress : Not at all   Housing Stability: Unknown (1/18/2025)    Housing Stability  Vital Sign     Unable to Pay for Housing in the Last Year: No     Homeless in the Last Year: No

## 2025-03-07 NOTE — SUBJECTIVE & OBJECTIVE
Interval History: Pt has improved with IV meds,     Review of Systems   Constitutional: Negative for diaphoresis, malaise/fatigue, night sweats and weight gain.   HENT:  Negative for congestion, ear pain, hearing loss, nosebleeds and sore throat.    Eyes:  Negative for blurred vision, double vision, pain, photophobia and visual disturbance.   Cardiovascular:  Negative for chest pain, claudication, dyspnea on exertion, irregular heartbeat, leg swelling, near-syncope, orthopnea, palpitations and syncope.   Respiratory:  Negative for cough, shortness of breath, sleep disturbances due to breathing, snoring and wheezing.    Endocrine: Negative for cold intolerance, heat intolerance, polydipsia, polyphagia and polyuria.   Hematologic/Lymphatic: Negative for bleeding problem. Does not bruise/bleed easily.   Skin:  Negative for dry skin, flushing, itching, rash and skin cancer.   Musculoskeletal:  Negative for arthritis, back pain, falls, joint pain, muscle cramps, muscle weakness and myalgias.   Gastrointestinal:  Negative for abdominal pain, change in bowel habit, constipation, diarrhea, dysphagia, heartburn, nausea and vomiting.   Genitourinary:  Negative for bladder incontinence, dysuria, flank pain, frequency and nocturia.   Neurological:  Negative for dizziness, focal weakness, headaches, light-headedness, loss of balance, numbness, paresthesias and seizures.   Psychiatric/Behavioral:  Negative for depression, memory loss and substance abuse. The patient is not nervous/anxious.    Allergic/Immunologic: Negative for environmental allergies.   Objective:     Vital Signs (Most Recent):  Temp: 97.7 °F (36.5 °C) (03/07/25 1110)  Pulse: 71 (03/07/25 1110)  Resp: 17 (03/07/25 1110)  BP: 135/64 (03/07/25 1110)  SpO2: (!) 94 % (03/07/25 1110) Vital Signs (24h Range):  Temp:  [97.5 °F (36.4 °C)-98.1 °F (36.7 °C)] 97.7 °F (36.5 °C)  Pulse:  [70-72] 71  Resp:  [15-18] 17  SpO2:  [93 %-97 %] 94 %  BP: (118-139)/(56-73) 135/64      Weight: 72.1 kg (159 lb)  Body mass index is 24.18 kg/m².     SpO2: (!) 94 %         Intake/Output Summary (Last 24 hours) at 3/7/2025 1139  Last data filed at 3/6/2025 1600  Gross per 24 hour   Intake 240 ml   Output --   Net 240 ml       Lines/Drains/Airways       Peripheral Intravenous Line  Duration                  Peripheral IV - Single Lumen 03/06/25 1418 20 G Posterior;Right Hand <1 day                       Physical Exam  Vitals and nursing note reviewed.   Constitutional:       Appearance: Normal appearance. She is obese.   HENT:      Head: Normocephalic and atraumatic.      Right Ear: External ear normal.      Left Ear: External ear normal.   Eyes:      General: No scleral icterus.        Right eye: No discharge.         Left eye: No discharge.      Extraocular Movements: Extraocular movements intact.      Conjunctiva/sclera: Conjunctivae normal.      Pupils: Pupils are equal, round, and reactive to light.   Cardiovascular:      Rate and Rhythm: Normal rate and regular rhythm.      Pulses: Normal pulses.      Heart sounds: Normal heart sounds. No murmur heard.     No friction rub. No gallop.      Comments: Pacer site warm and dry, pacer interogated, functioning normally.   Pulmonary:      Effort: Pulmonary effort is normal.      Breath sounds: Normal breath sounds. No wheezing, rhonchi or rales.   Chest:      Chest wall: No tenderness.   Abdominal:      General: Abdomen is flat. Bowel sounds are normal. There is no distension.      Palpations: Abdomen is soft.      Tenderness: There is no abdominal tenderness. There is no guarding or rebound.   Musculoskeletal:         General: No swelling or tenderness. Normal range of motion.      Cervical back: Normal range of motion and neck supple.   Skin:     General: Skin is warm and dry.      Findings: No erythema or rash.   Neurological:      General: No focal deficit present.      Mental Status: She is alert and oriented to person, place, and time.       Cranial Nerves: No cranial nerve deficit.      Motor: No weakness.      Gait: Gait normal.   Psychiatric:         Mood and Affect: Mood normal.         Behavior: Behavior normal.         Thought Content: Thought content normal.         Judgment: Judgment normal.        Significant Labs:   Recent Lab Results         03/06/25  1459   03/06/25  1339        Albumin/Globulin Ratio   1.1       A4C EF 48         Albumin   3.7       ALP   105       ALT   19       Anion Gap   14       Ao root annulus 3.03         Ao peak hussein 1.2         Ao VTI 23.2         AR Max Hussein 2.30         AST   25       AV valve area 3.6         JAMIE by Velocity Ratio 3.8         AORTIC VALVE CUSP SEPERATION 2.45         AV mean gradient 3         AV index (prosthetic) 0.81         AV peak gradient 6         AV regurgitation pressure 1/2 time 544         AV Velocity Ratio 0.83         Baso #   0.03       Basophil %   0.3       BILIRUBIN TOTAL   0.3       BUN   23       BUN/CREAT RATIO   12       Calcium   9.3       Chloride   104       CO2   29       Creatinine   1.94       Left Ventricle Relative Wall Thickness 0.45         Differential Method   Auto       E/A ratio 1.61         E/E' ratio 15         eGFR   26  Comment: Estimated GFR calculated using the CKD-EPI creatinine (2021) equation.       EF 40         Eos #   0.16       Eos %   1.5       E wave deceleration time 213         FS 12.2         Globulin, Total   3.3       Glucose   140       Hematocrit   37.7       Hemoglobin   12.0       Immature Grans (Abs)   0.03       Immature Granulocytes   0.3       IVC diameter 0.79         IVSd 1.2         LA area A4C 14.31         LA size 3.7         LVOT area 4.5         LV LATERAL E/E' RATIO 11.5         LV SEPTAL E/E' RATIO 23.0         LV EDV          Left Ventricular End Diastolic Volume by Teichholz Method 111.76         LV EDV A4C 72.04         Left Ventricular End Systolic Volume by Teichholz Method 81.80         LV ESV A4C 31.93          LVIDd 4.9         LVIDs 4.3         LV mass 213.3         Left Ventricular Outflow Tract Mean Gradient 2.16         Left Ventricular Outflow Tract Mean Velocity 0.69         LVOT diameter 2.4         LVOT peak hussein 1.0         LVOT stroke volume 84.6         LVOT peak VTI 18.7         LV ESV BP 82         Lymph #   1.73       Lymph %   16.5       MCH   31.3       MCHC   31.8       MCV   98.2       Mean e' 0.06         Mono #   0.96       Mono %   9.1       MPV   11.6       MV Peak A Hussein 0.57         MV Peak E Hussein 0.92         Neutrophils, Abs   7.60       Neutrophils Relative   72.3       nRBC   0.0       NUCLEATED RBC ABSOLUTE   0.00       Platelet Count   253       Potassium   4.8       PROTEIN TOTAL   7.0       PV peak gradient 7         PV PEAK VELOCITY 1.35         PW 1.1         RA Major Axis 4.33         Est. RA pres 3         RBC   3.84       RDW   13.2       RV mid diameter 3.10         RV TB RVSP 6         RV/LV Ratio 0.82         RV- martinez basal diam 4.0         RV-martinez length 5.0         RV-martinez mid d 3.1         RV Basal Diameter 4.95         Sodium   142       TAPSE 2.19         TDI SEPTAL 0.04         TDI LATERAL 0.08         Triscuspid Valve Regurgitation Peak Gradient 32         TR Max Hussein 2.8         TV resting pulmonary artery pressure 34         WBC   10.51               Significant Imaging: Echocardiogram: Transthoracic echo (TTE) complete (Cupid Only):   Results for orders placed or performed during the hospital encounter of 03/06/25   Echo   Result Value Ref Range    A4C EF 48 %    LVOT stroke volume 84.6 cm3    LVIDd 4.9 3.5 - 6.0 cm    LV Systolic Volume 82 mL    LVIDs 4.3 (A) 2.1 - 4.0 cm    LV Diastolic Volume 112 mL    LV ESV A4C 31.93 mL    LV EDV A4C 72.04 mL    Left Ventricular End Systolic Volume by Teichholz Method 81.80 mL    Left Ventricular End Diastolic Volume by Teichholz Method 111.76 mL    IVS 1.2 (A) 0.6 - 1.1 cm    LVOT diameter 2.4 cm    LVOT area 4.5 cm2    FS 12.2 (A) 28 -  44 %    Left Ventricle Relative Wall Thickness 0.45 cm    PW 1.1 0.6 - 1.1 cm    LV mass 213.3 g    MV Peak E Hussein 0.92 m/s    TDI LATERAL 0.08 m/s    TDI SEPTAL 0.04 m/s    E/E' ratio 15 m/s    MV Peak A Hussein 0.57 m/s    TR Max Hussein 2.8 m/s    E/A ratio 1.61     E wave deceleration time 213 msec    LV SEPTAL E/E' RATIO 23.0 m/s    LV LATERAL E/E' RATIO 11.5 m/s    LVOT peak hussein 1.0 m/s    Left Ventricular Outflow Tract Mean Velocity 0.69 cm/s    Left Ventricular Outflow Tract Mean Gradient 2.16 mmHg    RV- martinez basal diam 4.0 cm    RV-martinez mid d 3.1 cm    RV Basal Diameter 4.95 cm    RV-martinez length 5.0 cm    RV mid diameter 3.10 cm    TAPSE 2.19 cm    RV/LV Ratio 0.82 cm    LA size 3.7 cm    RA Major Axis 4.33 cm    AV regurgitation pressure 1/2 time 544 ms    AR Max Hussein 2.30 m/s    AV mean gradient 3 mmHg    AV peak gradient 6 mmHg    Ao peak hussein 1.2 m/s    Ao VTI 23.2 cm    LVOT peak VTI 18.7 cm    AV valve area 3.6 cm²    AV Velocity Ratio 0.83     AV index (prosthetic) 0.81     JAMIE by Velocity Ratio 3.8 cm²    Triscuspid Valve Regurgitation Peak Gradient 32 mmHg    PV PEAK VELOCITY 1.35 m/s    PV peak gradient 7 mmHg    Ao root annulus 3.03 cm    IVC diameter 0.79 cm    Mean e' 0.06 m/s    LA area A4C 14.31 cm2    AORTIC VALVE CUSP SEPERATION 2.45 cm    EF 40 %    TV resting pulmonary artery pressure 34 mmHg    RV TB RVSP 6 mmHg    Est. RA pres 3 mmHg    Narrative      Left Ventricle: The left ventricle is mildly dilated. Mildly increased   wall thickness. There is concentric hypertrophy. Moderate global   hypokinesis present. Septal motion is consistent with pacing. There is   mildly reduced systolic function with a visually estimated ejection   fraction of 40 - 45%. Ejection fraction is approximately 40%. Grade I   diastolic dysfunction.    Right Ventricle: The right ventricle has mild enlargement. Systolic   function is borderline low.    Right Atrium: Right atrium is mildly dilated.    Aortic Valve: The aortic  valve is a trileaflet valve. Mildly calcified   cusps. There is mild aortic regurgitation with an eccentrically directed   jet.    Mitral Valve: There is mild bileaflet sclerosis. Mildly thickened   leaflets. There is mild regurgitation with an eccentric jet.    Tricuspid Valve: There is mild regurgitation with an eccentrically   directed jet.    IVC/SVC: Normal venous pressure at 3 mmHg.

## 2025-03-07 NOTE — H&P
[facility] - [facility] Medical Telemetry  Cardiology  History and Physical     Patient Name: Flaquita Sosa  MRN: [id number]  Admission Date: 3/6/2025  Code Status: Full Code   Attending Provider: Peter Aparicio DO   Primary Care Physician: Laura Carnes FNP  Principal Problem:Allergic reaction    Patient information was obtained from patient and past medical records.     Subjective:     Chief Complaint:  itching and burning all over     HPI:  79-year-old female with PMH of HFrEF, HTN, AFib s/p ablation and ppm placement 6 days ago (Dr. Ulloa) who has complaints allergic reaction to vancomycin and contrast dye that was given during procedure.  States symptoms were severe Saturday following procedure, have improved some but has continued to experience severe itching and burning all over despite over-the-counter Benadryl and Pepcid.  Reports she was unable to sleep any last night due to discomfort.  She has been admitted for observation and medical management of allergic reaction with IV medications and continued cardiac monitoring.    Past Medical History:   Diagnosis Date    Hyperlipidemia     Hypertension        Past Surgical History:   Procedure Laterality Date    HYSTERECTOMY      OOPHORECTOMY         Review of patient's allergies indicates:   Allergen Reactions    Vancomycin analogues Rash    Iodinated contrast media Other (See Comments)       No current facility-administered medications on file prior to encounter.     Current Outpatient Medications on File Prior to Encounter   Medication Sig    amiodarone (PACERONE) 200 MG Tab Take 1 tablet (200 mg total) by mouth once daily.    cholecalciferol, vitamin D3, (VITAMIN D3) 50 mcg (2,000 unit) Cap capsule Take 1 capsule by mouth once daily.    digoxin (LANOXIN) 125 mcg tablet Take 1 tablet (0.125 mg total) by mouth once daily.    docusate sodium (COLACE) 100 MG capsule Take 1 capsule (100 mg total) by mouth 2 (two) times daily.    ELIQUIS 2.5 mg Tab  Take 1 tablet (2.5 mg total) by mouth 2 (two) times daily.    furosemide (LASIX) 40 MG tablet Take 1 tablet (40 mg total) by mouth once daily.    JARDIANCE 10 mg tablet Take 1 tablet (10 mg total) by mouth once daily.    metoprolol succinate (TOPROL-XL) 50 MG 24 hr tablet Take 1 tablet (50 mg total) by mouth once daily.    multivit,iron,minerals/lutein (CENTRUM SILVER ULTRA WOMEN'S ORAL) Take by mouth.    pravastatin (PRAVACHOL) 40 MG tablet Take 1 tablet (40 mg total) by mouth every evening.    sacubitriL-valsartan (ENTRESTO) 49-51 mg per tablet Take 1 tablet by mouth 2 (two) times daily.    spironolactone (ALDACTONE) 25 MG tablet Take 1 tablet (25 mg total) by mouth once daily.    [DISCONTINUED] carvediloL (COREG) 25 MG tablet     [DISCONTINUED] famotidine, PF, 20 mg/2 mL Soln Inject 2 mLs (20 mg total) into the vein once daily. (Patient not taking: Reported on 1/22/2025)     Family History    Family history is unknown by patient.       Tobacco Use    Smoking status: Never     Passive exposure: Never    Smokeless tobacco: Never   Substance and Sexual Activity    Alcohol use: Never    Drug use: Never    Sexual activity: Not Currently     Partners: Male     Review of Systems   Constitutional: Negative for chills and fever.   HENT: Negative.     Eyes: Negative.    Cardiovascular: Negative.    Respiratory: Negative.     Hematologic/Lymphatic: Negative for bleeding problem.   Skin:  Positive for flushing, itching and rash.   Gastrointestinal: Negative.    Genitourinary: Negative.    Neurological: Negative.    Psychiatric/Behavioral:  The patient has insomnia and is nervous/anxious.      Objective:     Vital Signs (Most Recent):  Temp: 98.1 °F (36.7 °C) (03/06/25 1745)  Pulse: 71 (03/06/25 1745)  Resp: 16 (03/06/25 1745)  BP: 133/62 (03/06/25 1745)  SpO2: 97 % (03/06/25 1745) Vital Signs (24h Range):  Temp:  [97.5 °F (36.4 °C)-98.1 °F (36.7 °C)] 98.1 °F (36.7 °C)  Pulse:  [70-71] 71  Resp:  [16-17] 16  SpO2:  [95 %-97  "%] 97 %  BP: (126-134)/(62-68) 133/62     Weight: 72.1 kg (159 lb)  Body mass index is 24.18 kg/m².    SpO2: 97 %         Intake/Output Summary (Last 24 hours) at 3/6/2025 1806  Last data filed at 3/6/2025 1600  Gross per 24 hour   Intake 240 ml   Output --   Net 240 ml       Lines/Drains/Airways       Peripheral Intravenous Line  Duration                  Peripheral IV - Single Lumen 03/06/25 1418 20 G Posterior;Right Hand <1 day                     Physical Exam  Vitals reviewed.   Constitutional:       General: She is not in acute distress.  HENT:      Mouth/Throat:      Mouth: Mucous membranes are moist.      Pharynx: Oropharynx is clear.   Eyes:      Pupils: Pupils are equal, round, and reactive to light.   Cardiovascular:      Rate and Rhythm: Normal rate and regular rhythm.   Pulmonary:      Effort: Pulmonary effort is normal.      Breath sounds: Normal breath sounds.   Abdominal:      General: Bowel sounds are normal.      Palpations: Abdomen is soft.   Musculoskeletal:      Cervical back: Neck supple.      Right lower leg: No edema.      Left lower leg: No edema.   Skin:     General: Skin is warm and dry.      Findings: Erythema present. No rash.   Neurological:      Mental Status: She is alert and oriented to person, place, and time.          Significant Labs: ABG: No results for input(s): "PH", "PCO2", "HCO3", "POCSATURATED", "BE" in the last 48 hours., Blood Culture: No results for input(s): "LABBLOO" in the last 48 hours., BMP:   Recent Labs   Lab 03/06/25  1339   *      K 4.8      CO2 29   BUN 23*   CREATININE 1.94*   CALCIUM 9.3   , CMP   Recent Labs   Lab 03/06/25  1339      K 4.8      CO2 29   *   BUN 23*   CREATININE 1.94*   CALCIUM 9.3   PROT 7.0   ALBUMIN 3.7   BILITOT 0.3   ALKPHOS 105   AST 25   ALT 19   ANIONGAP 14   , CBC   Recent Labs   Lab 03/06/25  1339   WBC 10.51   HGB 12.0   HCT 37.7*      , Lipid Panel No results for input(s): "CHOL", "HDL", " ""LDLCALC", "TRIG", "CHOLHDL" in the last 48 hours., and Troponin No results for input(s): "TROPONINIHS" in the last 48 hours.    Significant Imaging: Echocardiogram: Transthoracic echo (TTE) complete (Cupid Only):   Results for orders placed or performed during the hospital encounter of 03/06/25   Echo   Result Value Ref Range    A4C EF 48 %    LVOT stroke volume 84.6 cm3    LVIDd 4.9 3.5 - 6.0 cm    LV Systolic Volume 82 mL    LVIDs 4.3 (A) 2.1 - 4.0 cm    LV Diastolic Volume 112 mL    LV ESV A4C 31.93 mL    LV EDV A4C 72.04 mL    Left Ventricular End Systolic Volume by Teichholz Method 81.80 mL    Left Ventricular End Diastolic Volume by Teichholz Method 111.76 mL    IVS 1.2 (A) 0.6 - 1.1 cm    LVOT diameter 2.4 cm    LVOT area 4.5 cm2    FS 12.2 (A) 28 - 44 %    Left Ventricle Relative Wall Thickness 0.49 cm    PW 1.2 (A) 0.6 - 1.1 cm    LV mass 226.4 g    MV Peak E Hussein 0.92 m/s    TDI LATERAL 0.08 m/s    TDI SEPTAL 0.04 m/s    E/E' ratio 15 m/s    MV Peak A Hussein 0.57 m/s    TR Max Hussein 2.8 m/s    E/A ratio 1.61     E wave deceleration time 213 msec    LV SEPTAL E/E' RATIO 23.0 m/s    LV LATERAL E/E' RATIO 11.5 m/s    LVOT peak hussein 1.0 m/s    Left Ventricular Outflow Tract Mean Velocity 0.69 cm/s    Left Ventricular Outflow Tract Mean Gradient 2.16 mmHg    RV- martinez basal diam 4.0 cm    RV-martinez mid d 3.1 cm    RV Basal Diameter 4.95 cm    RV-martinez length 5.0 cm    RV mid diameter 3.10 cm    TAPSE 2.19 cm    RV/LV Ratio 0.82 cm    LA size 3.7 cm    RA Major Axis 4.33 cm    AV regurgitation pressure 1/2 time 544 ms    AR Max Hussein 2.30 m/s    AV mean gradient 3 mmHg    AV peak gradient 6 mmHg    Ao peak hussein 1.2 m/s    Ao VTI 23.2 cm    LVOT peak VTI 18.7 cm    AV valve area 3.6 cm²    AV Velocity Ratio 0.83     AV index (prosthetic) 0.81     JAMIE by Velocity Ratio 3.8 cm²    Triscuspid Valve Regurgitation Peak Gradient 32 mmHg    PV PEAK VELOCITY 1.35 m/s    PV peak gradient 7 mmHg    Ao root annulus 3.03 cm    IVC " diameter 0.79 cm    Mean e' 0.06 m/s    LA area A4C 14.31 cm2    AORTIC VALVE CUSP SEPERATION 2.45 cm    EF 40 %    TV resting pulmonary artery pressure 34 mmHg    RV TB RVSP 6 mmHg    Est. RA pres 3 mmHg     Assessment and Plan:     * Allergic reaction  - patient seen and evaluated by Dr. Aparicio  - s/p afib ablation and PPM placement 6 days ago by Dr. Ulloa  - received vanc and iv contrast; reports severe allergic reaction with continued pruritus and burning despite over-the-counter Benadryl and Pepcid  - had IV benadryl 25 x 1 dose and solumedrol 125 x 1 dose, tolerated well, not much relief; increased to IV benadryl 50mg Q12 x 2 more doses and solumedrol 80mg Q6 hours x 3 doses  - continue monitoring on tele    HFrEF (heart failure with reduced ejection fraction)  - appears euvolemic, continue home GDMT  - repeat echo pending    Insomnia  - trazodone 25mg qhs prn    Atrial fibrillation  - s/p ablation and ppm placement 6 days ago by Dr. Ulloa  - remains on Eliquis        VTE Risk Mitigation (From admission, onward)           Ordered     apixaban tablet 2.5 mg  2 times daily         03/06/25 1337     Reason for no Mechanical VTE Prophylaxis  Once        Question:  Reasons:  Answer:  Patient is Ambulatory    03/06/25 1328     IP VTE HIGH RISK PATIENT  Once         03/06/25 1328     Reason for No Pharmacological VTE Prophylaxis  Once        Question:  Reasons:  Answer:  Patient is Ambulatory    03/06/25 1328                    Romi Galicia, ALVARO  Cardiology   [facility] - [address] [place] [address]    PT seen and examined, chart reviewed, essentially agree with findings above, will continue IV benedryl, solumedrol, oral sedation with Tramadol as needed, monitor at least overnight, or until rash subsides.

## 2025-03-07 NOTE — ASSESSMENT & PLAN NOTE
- s/p ablation and ppm placement 6 days ago by Dr. Ulloa  - remains on Eliquis  - maintained NSR following IV steroids which has provokde episode of A fib with RVR in the past.

## 2025-03-07 NOTE — PROGRESS NOTES
Pacemaker site looks good. Slight bruising noted. Steri-strips still applied with slight dried blood.       Pt notified to move below elbow, but do not lift arm above shoulder. Pt notified no vibrations. Pt notified okay to shower and wash with soap and water, but do not submerge in bathtub due to ablation site.

## 2025-03-07 NOTE — ASSESSMENT & PLAN NOTE
- appears euvolemic, continue home GDMT  - repeat echo shows some recovery of LV systolic function, EF now 40%

## 2025-03-07 NOTE — HPI
79-year-old female with PMH of HFrEF, HTN, AFib s/p ablation and ppm placement 6 days ago (Dr. Ulloa) who has complaints allergic reaction to vancomycin and contrast dye that was given during procedure.  States symptoms were severe Saturday following procedure, have improved some but has continued to experience severe itching and burning all over despite over-the-counter Benadryl and Pepcid.  Reports she was unable to sleep any last night due to discomfort.  She has been admitted for observation and medical management of allergic reaction with IV medications and continued cardiac monitoring.    3/7/25    PT reports skin itching and burning has resolved.  She reports is back to baseline

## 2025-03-07 NOTE — NURSING
Pt to room 655 at this time via w/c from registration. Pt is a direct admit. Pt daughter in law present at bedside. Pt states she had an heart ablation last Friday in [place] and has been itching all over her body since the procedure. No welps or sob at present. Romi Galicia Np contacted to let know Pt is here. Waiting for orders. Pt alert and oriented x4, VSS. Will continue to monitor.

## 2025-03-07 NOTE — SUBJECTIVE & OBJECTIVE
Past Medical History:   Diagnosis Date    Hyperlipidemia     Hypertension        Past Surgical History:   Procedure Laterality Date    HYSTERECTOMY      OOPHORECTOMY         Review of patient's allergies indicates:   Allergen Reactions    Vancomycin analogues Rash    Iodinated contrast media Other (See Comments)       No current facility-administered medications on file prior to encounter.     Current Outpatient Medications on File Prior to Encounter   Medication Sig    amiodarone (PACERONE) 200 MG Tab Take 1 tablet (200 mg total) by mouth once daily.    cholecalciferol, vitamin D3, (VITAMIN D3) 50 mcg (2,000 unit) Cap capsule Take 1 capsule by mouth once daily.    digoxin (LANOXIN) 125 mcg tablet Take 1 tablet (0.125 mg total) by mouth once daily.    docusate sodium (COLACE) 100 MG capsule Take 1 capsule (100 mg total) by mouth 2 (two) times daily.    ELIQUIS 2.5 mg Tab Take 1 tablet (2.5 mg total) by mouth 2 (two) times daily.    furosemide (LASIX) 40 MG tablet Take 1 tablet (40 mg total) by mouth once daily.    JARDIANCE 10 mg tablet Take 1 tablet (10 mg total) by mouth once daily.    metoprolol succinate (TOPROL-XL) 50 MG 24 hr tablet Take 1 tablet (50 mg total) by mouth once daily.    multivit,iron,minerals/lutein (CENTRUM SILVER ULTRA WOMEN'S ORAL) Take by mouth.    pravastatin (PRAVACHOL) 40 MG tablet Take 1 tablet (40 mg total) by mouth every evening.    sacubitriL-valsartan (ENTRESTO) 49-51 mg per tablet Take 1 tablet by mouth 2 (two) times daily.    spironolactone (ALDACTONE) 25 MG tablet Take 1 tablet (25 mg total) by mouth once daily.    [DISCONTINUED] carvediloL (COREG) 25 MG tablet     [DISCONTINUED] famotidine, PF, 20 mg/2 mL Soln Inject 2 mLs (20 mg total) into the vein once daily. (Patient not taking: Reported on 1/22/2025)     Family History    Family history is unknown by patient.       Tobacco Use    Smoking status: Never     Passive exposure: Never    Smokeless tobacco: Never   Substance and  Sexual Activity    Alcohol use: Never    Drug use: Never    Sexual activity: Not Currently     Partners: Male     Review of Systems   Constitutional: Negative for chills and fever.   HENT: Negative.     Eyes: Negative.    Cardiovascular: Negative.    Respiratory: Negative.     Hematologic/Lymphatic: Negative for bleeding problem.   Skin:  Positive for flushing, itching and rash.   Gastrointestinal: Negative.    Genitourinary: Negative.    Neurological: Negative.    Psychiatric/Behavioral:  The patient has insomnia and is nervous/anxious.      Objective:     Vital Signs (Most Recent):  Temp: 98.1 °F (36.7 °C) (03/06/25 1745)  Pulse: 71 (03/06/25 1745)  Resp: 16 (03/06/25 1745)  BP: 133/62 (03/06/25 1745)  SpO2: 97 % (03/06/25 1745) Vital Signs (24h Range):  Temp:  [97.5 °F (36.4 °C)-98.1 °F (36.7 °C)] 98.1 °F (36.7 °C)  Pulse:  [70-71] 71  Resp:  [16-17] 16  SpO2:  [95 %-97 %] 97 %  BP: (126-134)/(62-68) 133/62     Weight: 72.1 kg (159 lb)  Body mass index is 24.18 kg/m².    SpO2: 97 %         Intake/Output Summary (Last 24 hours) at 3/6/2025 1806  Last data filed at 3/6/2025 1600  Gross per 24 hour   Intake 240 ml   Output --   Net 240 ml       Lines/Drains/Airways       Peripheral Intravenous Line  Duration                  Peripheral IV - Single Lumen 03/06/25 1418 20 G Posterior;Right Hand <1 day                     Physical Exam  Vitals reviewed.   Constitutional:       General: She is not in acute distress.  HENT:      Mouth/Throat:      Mouth: Mucous membranes are moist.      Pharynx: Oropharynx is clear.   Eyes:      Pupils: Pupils are equal, round, and reactive to light.   Cardiovascular:      Rate and Rhythm: Normal rate and regular rhythm.   Pulmonary:      Effort: Pulmonary effort is normal.      Breath sounds: Normal breath sounds.   Abdominal:      General: Bowel sounds are normal.      Palpations: Abdomen is soft.   Musculoskeletal:      Cervical back: Neck supple.      Right lower leg: No edema.       "Left lower leg: No edema.   Skin:     General: Skin is warm and dry.      Findings: Erythema present. No rash.   Neurological:      Mental Status: She is alert and oriented to person, place, and time.          Significant Labs: ABG: No results for input(s): "PH", "PCO2", "HCO3", "POCSATURATED", "BE" in the last 48 hours., Blood Culture: No results for input(s): "LABBLOO" in the last 48 hours., BMP:   Recent Labs   Lab 03/06/25  1339   *      K 4.8      CO2 29   BUN 23*   CREATININE 1.94*   CALCIUM 9.3   , CMP   Recent Labs   Lab 03/06/25  1339      K 4.8      CO2 29   *   BUN 23*   CREATININE 1.94*   CALCIUM 9.3   PROT 7.0   ALBUMIN 3.7   BILITOT 0.3   ALKPHOS 105   AST 25   ALT 19   ANIONGAP 14   , CBC   Recent Labs   Lab 03/06/25  1339   WBC 10.51   HGB 12.0   HCT 37.7*      , Lipid Panel No results for input(s): "CHOL", "HDL", "LDLCALC", "TRIG", "CHOLHDL" in the last 48 hours., and Troponin No results for input(s): "TROPONINIHS" in the last 48 hours.    Significant Imaging: Echocardiogram: Transthoracic echo (TTE) complete (Cupid Only):   Results for orders placed or performed during the hospital encounter of 03/06/25   Echo   Result Value Ref Range    A4C EF 48 %    LVOT stroke volume 84.6 cm3    LVIDd 4.9 3.5 - 6.0 cm    LV Systolic Volume 82 mL    LVIDs 4.3 (A) 2.1 - 4.0 cm    LV Diastolic Volume 112 mL    LV ESV A4C 31.93 mL    LV EDV A4C 72.04 mL    Left Ventricular End Systolic Volume by Teichholz Method 81.80 mL    Left Ventricular End Diastolic Volume by Teichholz Method 111.76 mL    IVS 1.2 (A) 0.6 - 1.1 cm    LVOT diameter 2.4 cm    LVOT area 4.5 cm2    FS 12.2 (A) 28 - 44 %    Left Ventricle Relative Wall Thickness 0.49 cm    PW 1.2 (A) 0.6 - 1.1 cm    LV mass 226.4 g    MV Peak E Hussein 0.92 m/s    TDI LATERAL 0.08 m/s    TDI SEPTAL 0.04 m/s    E/E' ratio 15 m/s    MV Peak A Hussein 0.57 m/s    TR Max Hussein 2.8 m/s    E/A ratio 1.61     E wave deceleration time 213 " msec    LV SEPTAL E/E' RATIO 23.0 m/s    LV LATERAL E/E' RATIO 11.5 m/s    LVOT peak hussein 1.0 m/s    Left Ventricular Outflow Tract Mean Velocity 0.69 cm/s    Left Ventricular Outflow Tract Mean Gradient 2.16 mmHg    RV- martinez basal diam 4.0 cm    RV-martinez mid d 3.1 cm    RV Basal Diameter 4.95 cm    RV-martinez length 5.0 cm    RV mid diameter 3.10 cm    TAPSE 2.19 cm    RV/LV Ratio 0.82 cm    LA size 3.7 cm    RA Major Axis 4.33 cm    AV regurgitation pressure 1/2 time 544 ms    AR Max Hussein 2.30 m/s    AV mean gradient 3 mmHg    AV peak gradient 6 mmHg    Ao peak hussein 1.2 m/s    Ao VTI 23.2 cm    LVOT peak VTI 18.7 cm    AV valve area 3.6 cm²    AV Velocity Ratio 0.83     AV index (prosthetic) 0.81     JAMIE by Velocity Ratio 3.8 cm²    Triscuspid Valve Regurgitation Peak Gradient 32 mmHg    PV PEAK VELOCITY 1.35 m/s    PV peak gradient 7 mmHg    Ao root annulus 3.03 cm    IVC diameter 0.79 cm    Mean e' 0.06 m/s    LA area A4C 14.31 cm2    AORTIC VALVE CUSP SEPERATION 2.45 cm    EF 40 %    TV resting pulmonary artery pressure 34 mmHg    RV TB RVSP 6 mmHg    Est. RA pres 3 mmHg

## 2025-03-07 NOTE — HOSPITAL COURSE
Pt admitted to outpatient for observation, IV bendryl  and steriods, symptoms improved over last 12 hours, will discharge to home. Previous episode of a fib with RVR post IV steroids did not reoccur.

## 2025-03-07 NOTE — NURSING
Patient was provided with understanding discharge care treatment follow up appointments. Informed about care to the pacemaker site care. Site clean dry steri strips intact at this time. Ensure understanding of limited mobility for safety and healing of incision site. Needs are met. Belongings at the bedside packed in suite case. Patient has family to come and pick her up for discharge.

## 2025-03-07 NOTE — ASSESSMENT & PLAN NOTE
- patient seen and evaluated by Dr. Aparicio  - s/p afib ablation and PPM placement 6 days ago by Dr. Ulloa  - received vanc and iv contrast; reports severe allergic reaction with continued pruritus and burning despite over-the-counter Benadryl and Pepcid  - had IV benadryl 25 x 1 dose and solumedrol 125 x 1 dose, tolerated well, not much relief; increased to IV benadryl 50mg Q12 x 2 more doses and solumedrol 80mg Q6 hours x 3 doses  - continue monitoring on tele

## 2025-03-07 NOTE — PROGRESS NOTES
Pharmacist Renal Dose Adjustment Note    Flaquita Sosa is a 79 y.o. female being treated with the medication famotidine    Patient Data:    Vital Signs (Most Recent):  Temp: 98.1 °F (36.7 °C) (03/06/25 1745)  Pulse: 71 (03/06/25 1745)  Resp: 16 (03/06/25 1745)  BP: 133/62 (03/06/25 1745)  SpO2: 97 % (03/06/25 1745) Vital Signs (72h Range):  Temp:  [97.5 °F (36.4 °C)-98.1 °F (36.7 °C)]   Pulse:  [70-71]   Resp:  [16-17]   BP: (126-134)/(62-68)   SpO2:  [95 %-97 %]      Recent Labs   Lab 03/06/25  1339   CREATININE 1.94*     Serum creatinine: 1.94 mg/dL (H) 03/06/25 1339  Estimated creatinine clearance: 24.1 mL/min (A)    Medication:famotidine dose: 20 mg frequency bid will be changed to medication:famotidine dose:20 mg frequency:every other day    Pharmacist's Name: Tanisha Mendoza  Pharmacist's Extension: [phone]

## 2025-03-07 NOTE — PROGRESS NOTES
[facility] - [facility] Telemetry  Cardiology  Progress Note    Patient Name: Flaquita Sosa  MRN: [id number]  Admission Date: 3/6/2025  Hospital Length of Stay: 0 days  Code Status: Full Code   Attending Physician: Peter Aparicio DO   Primary Care Physician: Laura Carnes FNP  Expected Discharge Date: 3/7/2025  Principal Problem:Allergic reaction    Subjective:     Hospital Course:   Pt admitted to outpatient for observation, IV bendryl  and steriods, symptoms improved over last 12 hours, will discharge to home. Previous episode of a fib with RVR post IV steroids did not reoccur.     Interval History: Pt has improved with IV meds,     Review of Systems   Constitutional: Negative for diaphoresis, malaise/fatigue, night sweats and weight gain.   HENT:  Negative for congestion, ear pain, hearing loss, nosebleeds and sore throat.    Eyes:  Negative for blurred vision, double vision, pain, photophobia and visual disturbance.   Cardiovascular:  Negative for chest pain, claudication, dyspnea on exertion, irregular heartbeat, leg swelling, near-syncope, orthopnea, palpitations and syncope.   Respiratory:  Negative for cough, shortness of breath, sleep disturbances due to breathing, snoring and wheezing.    Endocrine: Negative for cold intolerance, heat intolerance, polydipsia, polyphagia and polyuria.   Hematologic/Lymphatic: Negative for bleeding problem. Does not bruise/bleed easily.   Skin:  Negative for dry skin, flushing, itching, rash and skin cancer.   Musculoskeletal:  Negative for arthritis, back pain, falls, joint pain, muscle cramps, muscle weakness and myalgias.   Gastrointestinal:  Negative for abdominal pain, change in bowel habit, constipation, diarrhea, dysphagia, heartburn, nausea and vomiting.   Genitourinary:  Negative for bladder incontinence, dysuria, flank pain, frequency and nocturia.   Neurological:  Negative for dizziness, focal weakness, headaches, light-headedness, loss of  balance, numbness, paresthesias and seizures.   Psychiatric/Behavioral:  Negative for depression, memory loss and substance abuse. The patient is not nervous/anxious.    Allergic/Immunologic: Negative for environmental allergies.   Objective:     Vital Signs (Most Recent):  Temp: 97.7 °F (36.5 °C) (03/07/25 1110)  Pulse: 71 (03/07/25 1110)  Resp: 17 (03/07/25 1110)  BP: 135/64 (03/07/25 1110)  SpO2: (!) 94 % (03/07/25 1110) Vital Signs (24h Range):  Temp:  [97.5 °F (36.4 °C)-98.1 °F (36.7 °C)] 97.7 °F (36.5 °C)  Pulse:  [70-72] 71  Resp:  [15-18] 17  SpO2:  [93 %-97 %] 94 %  BP: (118-139)/(56-73) 135/64     Weight: 72.1 kg (159 lb)  Body mass index is 24.18 kg/m².     SpO2: (!) 94 %         Intake/Output Summary (Last 24 hours) at 3/7/2025 1139  Last data filed at 3/6/2025 1600  Gross per 24 hour   Intake 240 ml   Output --   Net 240 ml       Lines/Drains/Airways       Peripheral Intravenous Line  Duration                  Peripheral IV - Single Lumen 03/06/25 1418 20 G Posterior;Right Hand <1 day                       Physical Exam  Vitals and nursing note reviewed.   Constitutional:       Appearance: Normal appearance. She is obese.   HENT:      Head: Normocephalic and atraumatic.      Right Ear: External ear normal.      Left Ear: External ear normal.   Eyes:      General: No scleral icterus.        Right eye: No discharge.         Left eye: No discharge.      Extraocular Movements: Extraocular movements intact.      Conjunctiva/sclera: Conjunctivae normal.      Pupils: Pupils are equal, round, and reactive to light.   Cardiovascular:      Rate and Rhythm: Normal rate and regular rhythm.      Pulses: Normal pulses.      Heart sounds: Normal heart sounds. No murmur heard.     No friction rub. No gallop.      Comments: Pacer site warm and dry, pacer interogated, functioning normally.   Pulmonary:      Effort: Pulmonary effort is normal.      Breath sounds: Normal breath sounds. No wheezing, rhonchi or rales.    Chest:      Chest wall: No tenderness.   Abdominal:      General: Abdomen is flat. Bowel sounds are normal. There is no distension.      Palpations: Abdomen is soft.      Tenderness: There is no abdominal tenderness. There is no guarding or rebound.   Musculoskeletal:         General: No swelling or tenderness. Normal range of motion.      Cervical back: Normal range of motion and neck supple.   Skin:     General: Skin is warm and dry.      Findings: No erythema or rash.   Neurological:      General: No focal deficit present.      Mental Status: She is alert and oriented to person, place, and time.      Cranial Nerves: No cranial nerve deficit.      Motor: No weakness.      Gait: Gait normal.   Psychiatric:         Mood and Affect: Mood normal.         Behavior: Behavior normal.         Thought Content: Thought content normal.         Judgment: Judgment normal.        Significant Labs:   Recent Lab Results         03/06/25  1459   03/06/25  1339        Albumin/Globulin Ratio   1.1       A4C EF 48         Albumin   3.7       ALP   105       ALT   19       Anion Gap   14       Ao root annulus 3.03         Ao peak hussein 1.2         Ao VTI 23.2         AR Max Hussein 2.30         AST   25       AV valve area 3.6         JAMIE by Velocity Ratio 3.8         AORTIC VALVE CUSP SEPERATION 2.45         AV mean gradient 3         AV index (prosthetic) 0.81         AV peak gradient 6         AV regurgitation pressure 1/2 time 544         AV Velocity Ratio 0.83         Baso #   0.03       Basophil %   0.3       BILIRUBIN TOTAL   0.3       BUN   23       BUN/CREAT RATIO   12       Calcium   9.3       Chloride   104       CO2   29       Creatinine   1.94       Left Ventricle Relative Wall Thickness 0.45         Differential Method   Auto       E/A ratio 1.61         E/E' ratio 15         eGFR   26  Comment: Estimated GFR calculated using the CKD-EPI creatinine (2021) equation.       EF 40         Eos #   0.16       Eos %   1.5       E  wave deceleration time 213         FS 12.2         Globulin, Total   3.3       Glucose   140       Hematocrit   37.7       Hemoglobin   12.0       Immature Grans (Abs)   0.03       Immature Granulocytes   0.3       IVC diameter 0.79         IVSd 1.2         LA area A4C 14.31         LA size 3.7         LVOT area 4.5         LV LATERAL E/E' RATIO 11.5         LV SEPTAL E/E' RATIO 23.0         LV EDV          Left Ventricular End Diastolic Volume by Teichholz Method 111.76         LV EDV A4C 72.04         Left Ventricular End Systolic Volume by Teichholz Method 81.80         LV ESV A4C 31.93         LVIDd 4.9         LVIDs 4.3         LV mass 213.3         Left Ventricular Outflow Tract Mean Gradient 2.16         Left Ventricular Outflow Tract Mean Velocity 0.69         LVOT diameter 2.4         LVOT peak hussein 1.0         LVOT stroke volume 84.6         LVOT peak VTI 18.7         LV ESV BP 82         Lymph #   1.73       Lymph %   16.5       MCH   31.3       MCHC   31.8       MCV   98.2       Mean e' 0.06         Mono #   0.96       Mono %   9.1       MPV   11.6       MV Peak A Hussein 0.57         MV Peak E Hussein 0.92         Neutrophils, Abs   7.60       Neutrophils Relative   72.3       nRBC   0.0       NUCLEATED RBC ABSOLUTE   0.00       Platelet Count   253       Potassium   4.8       PROTEIN TOTAL   7.0       PV peak gradient 7         PV PEAK VELOCITY 1.35         PW 1.1         RA Major Axis 4.33         Est. RA pres 3         RBC   3.84       RDW   13.2       RV mid diameter 3.10         RV TB RVSP 6         RV/LV Ratio 0.82         RV- martinez basal diam 4.0         RV-martinez length 5.0         RV-martinez mid d 3.1         RV Basal Diameter 4.95         Sodium   142       TAPSE 2.19         TDI SEPTAL 0.04         TDI LATERAL 0.08         Triscuspid Valve Regurgitation Peak Gradient 32         TR Max Hussein 2.8         TV resting pulmonary artery pressure 34         WBC   10.51               Significant Imaging:  Echocardiogram: Transthoracic echo (TTE) complete (Cupid Only):   Results for orders placed or performed during the hospital encounter of 03/06/25   Echo   Result Value Ref Range    A4C EF 48 %    LVOT stroke volume 84.6 cm3    LVIDd 4.9 3.5 - 6.0 cm    LV Systolic Volume 82 mL    LVIDs 4.3 (A) 2.1 - 4.0 cm    LV Diastolic Volume 112 mL    LV ESV A4C 31.93 mL    LV EDV A4C 72.04 mL    Left Ventricular End Systolic Volume by Teichholz Method 81.80 mL    Left Ventricular End Diastolic Volume by Teichholz Method 111.76 mL    IVS 1.2 (A) 0.6 - 1.1 cm    LVOT diameter 2.4 cm    LVOT area 4.5 cm2    FS 12.2 (A) 28 - 44 %    Left Ventricle Relative Wall Thickness 0.45 cm    PW 1.1 0.6 - 1.1 cm    LV mass 213.3 g    MV Peak E Hussein 0.92 m/s    TDI LATERAL 0.08 m/s    TDI SEPTAL 0.04 m/s    E/E' ratio 15 m/s    MV Peak A Hussein 0.57 m/s    TR Max Hussein 2.8 m/s    E/A ratio 1.61     E wave deceleration time 213 msec    LV SEPTAL E/E' RATIO 23.0 m/s    LV LATERAL E/E' RATIO 11.5 m/s    LVOT peak hussein 1.0 m/s    Left Ventricular Outflow Tract Mean Velocity 0.69 cm/s    Left Ventricular Outflow Tract Mean Gradient 2.16 mmHg    RV- martinez basal diam 4.0 cm    RV-martinez mid d 3.1 cm    RV Basal Diameter 4.95 cm    RV-martinez length 5.0 cm    RV mid diameter 3.10 cm    TAPSE 2.19 cm    RV/LV Ratio 0.82 cm    LA size 3.7 cm    RA Major Axis 4.33 cm    AV regurgitation pressure 1/2 time 544 ms    AR Max Hussein 2.30 m/s    AV mean gradient 3 mmHg    AV peak gradient 6 mmHg    Ao peak hussein 1.2 m/s    Ao VTI 23.2 cm    LVOT peak VTI 18.7 cm    AV valve area 3.6 cm²    AV Velocity Ratio 0.83     AV index (prosthetic) 0.81     JAMIE by Velocity Ratio 3.8 cm²    Triscuspid Valve Regurgitation Peak Gradient 32 mmHg    PV PEAK VELOCITY 1.35 m/s    PV peak gradient 7 mmHg    Ao root annulus 3.03 cm    IVC diameter 0.79 cm    Mean e' 0.06 m/s    LA area A4C 14.31 cm2    AORTIC VALVE CUSP SEPERATION 2.45 cm    EF 40 %    TV resting pulmonary artery pressure 34 mmHg     RV TB RVSP 6 mmHg    Est. RA pres 3 mmHg    Narrative      Left Ventricle: The left ventricle is mildly dilated. Mildly increased   wall thickness. There is concentric hypertrophy. Moderate global   hypokinesis present. Septal motion is consistent with pacing. There is   mildly reduced systolic function with a visually estimated ejection   fraction of 40 - 45%. Ejection fraction is approximately 40%. Grade I   diastolic dysfunction.    Right Ventricle: The right ventricle has mild enlargement. Systolic   function is borderline low.    Right Atrium: Right atrium is mildly dilated.    Aortic Valve: The aortic valve is a trileaflet valve. Mildly calcified   cusps. There is mild aortic regurgitation with an eccentrically directed   jet.    Mitral Valve: There is mild bileaflet sclerosis. Mildly thickened   leaflets. There is mild regurgitation with an eccentric jet.    Tricuspid Valve: There is mild regurgitation with an eccentrically   directed jet.    IVC/SVC: Normal venous pressure at 3 mmHg.       Assessment and Plan:     Brief HPI: pts symptoms have improved overnight following IV steroids and benedryl    HFrEF (heart failure with reduced ejection fraction)  - appears euvolemic, continue home GDMT  - repeat echo shows some recovery of LV systolic function, EF now 40%    Insomnia  - trazodone 25mg qhs prn    Atrial fibrillation  - s/p ablation and ppm placement 6 days ago by Dr. Ulloa  - remains on Eliquis  - maintained NSR following IV steroids which has provokde episode of A fib with RVR in the past.         VTE Risk Mitigation (From admission, onward)           Ordered     apixaban tablet 2.5 mg  2 times daily         03/06/25 1337     Reason for no Mechanical VTE Prophylaxis  Once        Question:  Reasons:  Answer:  Patient is Ambulatory    03/06/25 1328     IP VTE HIGH RISK PATIENT  Once         03/06/25 1328     Reason for No Pharmacological VTE Prophylaxis  Once        Question:  Reasons:  Answer:   Patient is Ambulatory    03/06/25 3687                    Peter Aparicio, DO  Cardiology  [facility] - [address] [place] [address]

## 2025-03-08 ENCOUNTER — HOSPITAL ENCOUNTER (EMERGENCY)
Facility: HOSPITAL | Age: 80
Discharge: HOME | End: 2025-03-08
Payer: MEDICARE

## 2025-03-08 VITALS
OXYGEN SATURATION: 95 % | BODY MASS INDEX: 24.4 KG/M2 | HEIGHT: 68 IN | TEMPERATURE: 98 F | HEART RATE: 97 BPM | RESPIRATION RATE: 20 BRPM | WEIGHT: 161 LBS | SYSTOLIC BLOOD PRESSURE: 128 MMHG | DIASTOLIC BLOOD PRESSURE: 66 MMHG

## 2025-03-08 DIAGNOSIS — M54.2 NECK PAIN: ICD-10-CM

## 2025-03-08 DIAGNOSIS — L29.9 ITCHING: Primary | ICD-10-CM

## 2025-03-08 DIAGNOSIS — T78.40XA ALLERGIC REACTION, INITIAL ENCOUNTER: ICD-10-CM

## 2025-03-08 LAB
ALBUMIN SERPL BCP-MCNC: 3.6 G/DL (ref 3.4–4.8)
ALBUMIN/GLOB SERPL: 1.1 {RATIO}
ALP SERPL-CCNC: 96 U/L (ref 40–150)
ALT SERPL W P-5'-P-CCNC: 18 U/L
ANION GAP SERPL CALCULATED.3IONS-SCNC: 16 MMOL/L (ref 7–16)
AST SERPL W P-5'-P-CCNC: 33 U/L (ref 5–34)
BASOPHILS # BLD AUTO: 0.06 K/UL (ref 0–0.2)
BASOPHILS NFR BLD AUTO: 0.6 % (ref 0–1)
BILIRUB SERPL-MCNC: 0.3 MG/DL
BUN SERPL-MCNC: 30 MG/DL (ref 10–20)
BUN/CREAT SERPL: 20 (ref 6–20)
CALCIUM SERPL-MCNC: 9.2 MG/DL (ref 8.4–10.2)
CHLORIDE SERPL-SCNC: 105 MMOL/L (ref 98–107)
CO2 SERPL-SCNC: 26 MMOL/L (ref 23–31)
CREAT SERPL-MCNC: 1.5 MG/DL (ref 0.55–1.02)
DIFFERENTIAL METHOD BLD: ABNORMAL
EGFR (NO RACE VARIABLE) (RUSH/TITUS): 36 ML/MIN/1.73M2
EOSINOPHIL # BLD AUTO: 0.15 K/UL (ref 0–0.5)
EOSINOPHIL NFR BLD AUTO: 1.4 % (ref 1–4)
ERYTHROCYTE [DISTWIDTH] IN BLOOD BY AUTOMATED COUNT: 13.4 % (ref 11.5–14.5)
GLOBULIN SER-MCNC: 3.2 G/DL (ref 2–4)
GLUCOSE SERPL-MCNC: 95 MG/DL (ref 82–115)
HCT VFR BLD AUTO: 38.8 % (ref 38–47)
HGB BLD-MCNC: 12 G/DL (ref 12–16)
IMM GRANULOCYTES # BLD AUTO: 0.05 K/UL (ref 0–0.04)
IMM GRANULOCYTES NFR BLD: 0.5 % (ref 0–0.4)
LYMPHOCYTES # BLD AUTO: 2.43 K/UL (ref 1–4.8)
LYMPHOCYTES NFR BLD AUTO: 22.4 % (ref 27–41)
MCH RBC QN AUTO: 30.3 PG (ref 27–31)
MCHC RBC AUTO-ENTMCNC: 30.9 G/DL (ref 32–36)
MCV RBC AUTO: 98 FL (ref 80–96)
MONOCYTES # BLD AUTO: 0.76 K/UL (ref 0–0.8)
MONOCYTES NFR BLD AUTO: 7 % (ref 2–6)
MPC BLD CALC-MCNC: 11.6 FL (ref 9.4–12.4)
NEUTROPHILS # BLD AUTO: 7.38 K/UL (ref 1.8–7.7)
NEUTROPHILS NFR BLD AUTO: 68.1 % (ref 53–65)
NRBC # BLD AUTO: 0 X10E3/UL
NRBC, AUTO (.00): 0 %
PLATELET # BLD AUTO: 276 K/UL (ref 150–400)
POTASSIUM SERPL-SCNC: 4.3 MMOL/L (ref 3.5–5.1)
PROT SERPL-MCNC: 6.8 G/DL (ref 5.8–7.6)
RBC # BLD AUTO: 3.96 M/UL (ref 4.2–5.4)
SODIUM SERPL-SCNC: 143 MMOL/L (ref 136–145)
WBC # BLD AUTO: 10.83 K/UL (ref 4.5–11)

## 2025-03-08 PROCEDURE — 96372 THER/PROPH/DIAG INJ SC/IM: CPT

## 2025-03-08 PROCEDURE — 85025 COMPLETE CBC W/AUTO DIFF WBC: CPT

## 2025-03-08 PROCEDURE — 99284 EMERGENCY DEPT VISIT MOD MDM: CPT | Mod: 25

## 2025-03-08 PROCEDURE — 36415 COLL VENOUS BLD VENIPUNCTURE: CPT

## 2025-03-08 PROCEDURE — 80053 COMPREHEN METABOLIC PANEL: CPT

## 2025-03-08 PROCEDURE — 96375 TX/PRO/DX INJ NEW DRUG ADDON: CPT

## 2025-03-08 PROCEDURE — 25000003 PHARM REV CODE 250

## 2025-03-08 PROCEDURE — 96374 THER/PROPH/DIAG INJ IV PUSH: CPT

## 2025-03-08 PROCEDURE — 96376 TX/PRO/DX INJ SAME DRUG ADON: CPT

## 2025-03-08 PROCEDURE — 63600175 PHARM REV CODE 636 W HCPCS

## 2025-03-08 RX ORDER — DIPHENHYDRAMINE HYDROCHLORIDE 50 MG/ML
25 INJECTION, SOLUTION INTRAMUSCULAR; INTRAVENOUS
Status: COMPLETED | OUTPATIENT
Start: 2025-03-08 | End: 2025-03-08

## 2025-03-08 RX ORDER — DEXAMETHASONE SODIUM PHOSPHATE 4 MG/ML
4 INJECTION, SOLUTION INTRA-ARTICULAR; INTRALESIONAL; INTRAMUSCULAR; INTRAVENOUS; SOFT TISSUE
Status: DISCONTINUED | OUTPATIENT
Start: 2025-03-08 | End: 2025-03-08

## 2025-03-08 RX ORDER — FAMOTIDINE 10 MG/ML
20 INJECTION, SOLUTION INTRAVENOUS
Status: COMPLETED | OUTPATIENT
Start: 2025-03-08 | End: 2025-03-08

## 2025-03-08 RX ORDER — PREDNISONE 20 MG/1
40 TABLET ORAL DAILY
Qty: 10 TABLET | Refills: 0 | Status: SHIPPED | OUTPATIENT
Start: 2025-03-08 | End: 2025-03-13

## 2025-03-08 RX ADMIN — DIPHENHYDRAMINE HYDROCHLORIDE 25 MG: 50 INJECTION INTRAMUSCULAR; INTRAVENOUS at 09:03

## 2025-03-08 RX ADMIN — FAMOTIDINE 20 MG: 10 INJECTION, SOLUTION INTRAVENOUS at 09:03

## 2025-03-08 RX ADMIN — DIPHENHYDRAMINE HYDROCHLORIDE 25 MG: 50 INJECTION INTRAMUSCULAR; INTRAVENOUS at 08:03

## 2025-03-08 RX ADMIN — METHYLPREDNISOLONE SODIUM SUCCINATE 80 MG: 40 INJECTION, POWDER, FOR SOLUTION INTRAMUSCULAR; INTRAVENOUS at 08:03

## 2025-03-08 NOTE — DISCHARGE INSTRUCTIONS
Benadryl 25 mg every 4-6 hours as needed for rash, itching, swelling, hives.  Complete full course of Prednisone.   Alternate Tylenol and Ibuprofen per label instructions for neck pain as needed.   Return to emergency department for any future emergencies.

## 2025-03-08 NOTE — ED PROVIDER NOTES
"Encounter Date: 3/8/2025       History     Chief Complaint   Patient presents with    Allergic Reaction    Neck Pain     Pt presents to the ED with c/o allergic reaction and neck pain after having allergic reaction to vancomycin during pacemaker placement and ablation last Friday. Pt was admitted here and discharged yesterday for the allergic reaction but has had no relief.      80 yo WF presents to ED with complaint of generalized itching. Patient is s/p pacemaker placement and ablation with history of A-fib. She was given Vancomycin and developed allergic reaction with itching. She was admitted for observation, IV bendryl  and steriods, symptoms improved over 12 hours so she was discharged home yesterday. Patient denies any other symptoms besides the itching/burning. She does endorse left sided neck pain and reports it feels like she "pulled" something.    The history is provided by the patient.     Review of patient's allergies indicates:   Allergen Reactions    Vancomycin analogues Rash    Iodinated contrast media Other (See Comments)     Past Medical History:   Diagnosis Date    Hyperlipidemia     Hypertension      Past Surgical History:   Procedure Laterality Date    HYSTERECTOMY      OOPHORECTOMY       Family History   Family history unknown: Yes     Social History[1]  Review of Systems   Constitutional:  Negative for chills and fever.   Respiratory:  Negative for shortness of breath.    Cardiovascular:  Negative for chest pain.   Gastrointestinal:  Negative for abdominal pain, nausea and vomiting.   Skin:  Negative for rash.       Physical Exam     Initial Vitals [03/08/25 0742]   BP Pulse Resp Temp SpO2   133/67 71 20 97.9 °F (36.6 °C) 97 %      MAP       --         Physical Exam    Constitutional: She appears well-developed and well-nourished. No distress.   Eyes: Pupils are equal, round, and reactive to light.   Neck:   Normal range of motion.  Cardiovascular:  Normal rate, regular rhythm and normal heart " sounds.           Pulmonary/Chest: Breath sounds normal. No respiratory distress.   Abdominal: Abdomen is soft. Bowel sounds are normal. There is no abdominal tenderness.   Musculoskeletal:         General: Normal range of motion.      Cervical back: Normal range of motion. No spinous process tenderness.     Neurological: She is alert and oriented to person, place, and time.   Skin: Skin is warm and dry.         Medical Screening Exam   See Full Note    ED Course   Procedures  Labs Reviewed   COMPREHENSIVE METABOLIC PANEL - Abnormal       Result Value    Sodium 143      Potassium 4.3      Chloride 105      CO2 26      Anion Gap 16      Glucose 95      BUN 30 (*)     Creatinine 1.50 (*)     BUN/Creatinine Ratio 20      Calcium 9.2      Total Protein 6.8      Albumin 3.6      Globulin 3.2      A/G Ratio 1.1      Bilirubin, Total 0.3      Alk Phos 96      ALT 18      AST 33      eGFR 36 (*)    CBC WITH DIFFERENTIAL - Abnormal    WBC 10.83      RBC 3.96 (*)     Hemoglobin 12.0      Hematocrit 38.8      MCV 98.0 (*)     MCH 30.3      MCHC 30.9 (*)     RDW 13.4      Platelet Count 276      MPV 11.6      Neutrophils % 68.1 (*)     Lymphocytes % 22.4 (*)     Monocytes % 7.0 (*)     Eosinophils % 1.4      Basophils % 0.6      Immature Granulocytes % 0.5 (*)     nRBC, Auto 0.0      Neutrophils, Abs 7.38      Lymphocytes, Absolute 2.43      Monocytes, Absolute 0.76      Eosinophils, Absolute 0.15      Basophils, Absolute 0.06      Immature Granulocytes, Absolute 0.05 (*)     nRBC, Absolute 0.00      Diff Type Auto     CBC W/ AUTO DIFFERENTIAL    Narrative:     The following orders were created for panel order CBC auto differential.  Procedure                               Abnormality         Status                     ---------                               -----------         ------                     CBC with Differential[[id number]]       Abnormal            Final result                 Please view results for these tests  on the individual orders.          Imaging Results    None          Medications   diphenhydrAMINE injection 25 mg (25 mg Intravenous Given 3/8/25 0839)   methylPREDNISolone sodium succinate injection 80 mg (80 mg Intramuscular Given 3/8/25 0838)   diphenhydrAMINE injection 25 mg (25 mg Intravenous Given 3/8/25 0925)   famotidine (PF) injection 20 mg (20 mg Intravenous Given 3/8/25 0925)     Medical Decision Making  Improved with Benadryl, IV steroids, and Pepcid in ED.   Rx for Prednisone x 5 days.   Continue Benadryl every 4-6 hours at home while symptoms persist.     Amount and/or Complexity of Data Reviewed  Labs: ordered.    Risk  Prescription drug management.               ED Course as of 03/08/25 1150   Sat Mar 08, 2025   0910 She notes some improvement after initial dose of Benadryl in ED. Still complains of itching on her back. She is sitting up, awake, alert oriented. Her last dose of Benadryl at home was 50 mg at 9:30 pm last night. [SH]      ED Course User Index  [SH] Natasha Kidd FNP                           Clinical Impression:   Final diagnoses:  [L29.9] Itching (Primary)  [T78.40XA] Allergic reaction, initial encounter  [M54.2] Neck pain        ED Disposition Condition    Discharge Stable          ED Prescriptions       Medication Sig Dispense Start Date End Date Auth. Provider    predniSONE (DELTASONE) 20 MG tablet Take 2 tablets (40 mg total) by mouth once daily. for 5 days 10 tablet 3/8/2025 3/13/2025 Natasha Kidd FNP          Follow-up Information    None            [1]   Social History  Tobacco Use    Smoking status: Never     Passive exposure: Never    Smokeless tobacco: Never   Substance Use Topics    Alcohol use: Never    Drug use: Never        Natasha Kidd FNP  03/08/25 1150

## 2025-03-10 ENCOUNTER — PATIENT OUTREACH (OUTPATIENT)
Dept: ADMINISTRATIVE | Facility: CLINIC | Age: 80
End: 2025-03-10

## 2025-03-10 LAB
BATTERY VOLTAGE (V): 3.22 V
ERI (V): 2.6 V
OHS CV DC PP MS1: 0.4 MS
OHS CV DC PP MS2: 0.4 MS
OHS CV DC PP V1: 3.5 V
OHS CV DC PP V2: 3.5 V

## 2025-03-10 NOTE — PROGRESS NOTES
C3 nurse spoke with Flaquita Sosa  for a TCC post hospital discharge follow up call. The patient has a scheduled [facility] appointment with Laura Carnes FNP  on 3/17/25 @ 140.

## 2025-03-14 ENCOUNTER — OFFICE VISIT (OUTPATIENT)
Dept: CARDIOLOGY | Facility: CLINIC | Age: 80
End: 2025-03-14
Payer: MEDICARE

## 2025-03-14 VITALS
RESPIRATION RATE: 18 BRPM | SYSTOLIC BLOOD PRESSURE: 160 MMHG | HEIGHT: 68 IN | DIASTOLIC BLOOD PRESSURE: 70 MMHG | BODY MASS INDEX: 23.79 KG/M2 | WEIGHT: 157 LBS | HEART RATE: 75 BPM

## 2025-03-14 DIAGNOSIS — I48.91 ATRIAL FIBRILLATION, UNSPECIFIED TYPE: Primary | ICD-10-CM

## 2025-03-14 DIAGNOSIS — Z95.0 PRESENCE OF CARDIAC PACEMAKER: Primary | ICD-10-CM

## 2025-03-14 DIAGNOSIS — I50.23 ACUTE ON CHRONIC SYSTOLIC HEART FAILURE: Primary | ICD-10-CM

## 2025-03-14 DIAGNOSIS — I48.91 ATRIAL FIBRILLATION, UNSPECIFIED TYPE: ICD-10-CM

## 2025-03-14 DIAGNOSIS — I10 HYPERTENSION, UNSPECIFIED TYPE: ICD-10-CM

## 2025-03-14 PROCEDURE — 99214 OFFICE O/P EST MOD 30 MIN: CPT | Mod: S$PBB,,, | Performed by: STUDENT IN AN ORGANIZED HEALTH CARE EDUCATION/TRAINING PROGRAM

## 2025-03-14 PROCEDURE — 99214 OFFICE O/P EST MOD 30 MIN: CPT | Mod: PBBFAC | Performed by: STUDENT IN AN ORGANIZED HEALTH CARE EDUCATION/TRAINING PROGRAM

## 2025-03-14 PROCEDURE — 99999 PR PBB SHADOW E&M-EST. PATIENT-LVL IV: CPT | Mod: PBBFAC,,, | Performed by: STUDENT IN AN ORGANIZED HEALTH CARE EDUCATION/TRAINING PROGRAM

## 2025-03-14 RX ORDER — DIPHENHYDRAMINE HCL 50 MG
50 CAPSULE ORAL NIGHTLY
Qty: 14 CAPSULE | Refills: 0 | Status: SHIPPED | OUTPATIENT
Start: 2025-03-14 | End: 2025-03-28

## 2025-03-14 RX ORDER — FAMOTIDINE 20 MG/1
20 TABLET, FILM COATED ORAL 2 TIMES DAILY
Qty: 30 TABLET | Refills: 1 | Status: SHIPPED | OUTPATIENT
Start: 2025-03-14 | End: 2025-03-28

## 2025-03-14 RX ORDER — FERROUS SULFATE 325(65) MG
325 TABLET, DELAYED RELEASE (ENTERIC COATED) ORAL DAILY
COMMUNITY

## 2025-03-14 NOTE — PATIENT INSTRUCTIONS
Take entresto 49-51mg twice a day  Take benadryl 50mg once a day at night  Take famotidine 20mg twice a day   Blood work in 1 week

## 2025-03-14 NOTE — PROGRESS NOTES
PCP: Laura Carnes FNP    Referring Provider:     Subjective:   Flaquita Sosa is a 79 y.o. female with hx of HFrEF, HTN, Afib, Dyslipidemia who presents for hospital discharge follow up.     3/14/25 - patient underwent afib ablation complicated by sick sinus requiring dual chamber pacemaker. Received IV vanc and contrast and is now having pruritis. Received PO benadryl but still having symptoms.     1/2025 - Pt was recently hospitalized here at [facility] (1/16/25-1/18/25) for acute on chronic heart failure and Afib RVR. Echo showed EF of 15-20% with severe bi-atrial enlargement suggestive of restrictive cardiomyopathy. Labs for cardiac amyloid were obtained. Pt was diuresed with IV lasix and also required IV dopamine. She had previously been hospitalized at [facility] (discharged from there 1/15/25) where she underwent LHC that revealed normal coronaries. She also underwent cardioversion x 2, converted to NSR however then converted back to Afib. She was referred to Dr. Ulloa, electrophysiologist, in [place].    Today, pt has no cardiac complaints. She states she is feeling well and is ready to go back to work. She is weighing herself daily and has not required extra lasix. EKG today shows Afib with HR 101bpm.       Fhx:  Shx: Never smoker, no ETOH or drug use    EKG   Results for orders placed or performed in visit on 01/29/25   EKG 12-lead    Collection Time: 01/29/25  8:55 AM   Result Value Ref Range    QRS Duration 166 ms    [facility] QTC Calculation 443 ms    Narrative    Test Reason : I27.20,    Vent. Rate : 101 BPM     Atrial Rate :    BPM     P-R Int :    ms          QRS Dur : 166 ms      QT Int : 342 ms       P-R-T Axes :     32 230 degrees    QTcB Int : 443 ms    Atrial fibrillation with rapid ventricular response  Left bundle branch block  Abnormal ECG  When compared with ECG of 16-Jan-2025 14:47,  MANUAL COMPARISON REQUIRED PREVIOUS ECG IS INCOMPATIBLE  Confirmed by Peter Aparicio ([id number]) on  "1/31/2025 4:16:20 PM    Referred By:            Confirmed By: Peter Aparicio     ECHO Results for orders placed during the hospital encounter of 01/16/25    Echo    Interpretation Summary    Left Ventricle: The left ventricle is moderately dilated. Mildly increased wall thickness. There is concentric hypertrophy. Septal motion is consistent with bundle branch block. There is severely reduced systolic function with a visually estimated ejection fraction of 15 - 20%. Quantitated ejection fraction is 17%. Unable to assess diastolic function due to atrial fibrillation.    Right Ventricle: Moderate right ventricular enlargement. Systolic function is moderately reduced.    Left Atrium: Left atrium is severely dilated.    Right Atrium: Right atrium is severely dilated.    Aortic Valve: The aortic valve is a trileaflet valve. Mildly calcified cusps. There is mild aortic regurgitation with a centrally directed jet.    Mitral Valve: There is mild bileaflet sclerosis. Mildly thickened leaflets. There is moderate to severe regurgitation with a centrally directed jet.    Tricuspid Valve: There is mild to moderate regurgitation with an eccentrically directed jet.    Pulmonic Valve: There is mild regurgitation.    Pulmonary Artery: The estimated pulmonary artery systolic pressure is 61 mmHg.    IVC/SVC: Elevated venous pressure at 15 mmHg.    Pericardium: There is a small effusion.    [facility] No results found for this or any previous visit.        Lab Results   Component Value Date     03/08/2025    K 4.3 03/08/2025     03/08/2025    CO2 26 03/08/2025    BUN 30 (H) 03/08/2025    CREATININE 1.50 (H) 03/08/2025    CALCIUM 9.2 03/08/2025    ANIONGAP 16 03/08/2025       No results found for: "CHOL"  No results found for: "HDL"  No results found for: "LDLCALC"  No results found for: "TRIG"  No results found for: "CHOLHDL"    Lab Results   Component Value Date    WBC 10.83 03/08/2025    HGB 12.0 03/08/2025    HCT 38.8 03/08/2025 "    MCV 98.0 (H) 03/08/2025     03/08/2025           Current Outpatient Medications:     amiodarone (PACERONE) 200 MG Tab, Take 1 tablet (200 mg total) by mouth once daily., Disp: 90 tablet, Rfl: 3    cholecalciferol, vitamin D3, (VITAMIN D3) 50 mcg (2,000 unit) Cap capsule, Take 1 capsule by mouth once daily., Disp: , Rfl:     digoxin (LANOXIN) 125 mcg tablet, Take 1 tablet (0.125 mg total) by mouth once daily., Disp: 30 tablet, Rfl: 1    docusate sodium (COLACE) 100 MG capsule, Take 1 capsule (100 mg total) by mouth 2 (two) times daily., Disp: , Rfl:     ELIQUIS 2.5 mg Tab, Take 1 tablet (2.5 mg total) by mouth 2 (two) times daily., Disp: 180 tablet, Rfl: 3    ferrous sulfate 325 (65 FE) MG EC tablet, Take 325 mg by mouth once daily., Disp: , Rfl:     furosemide (LASIX) 40 MG tablet, Take 1 tablet (40 mg total) by mouth once daily., Disp: 90 tablet, Rfl: 3    JARDIANCE 10 mg tablet, Take 1 tablet (10 mg total) by mouth once daily., Disp: 90 tablet, Rfl: 3    metoprolol succinate (TOPROL-XL) 50 MG 24 hr tablet, Take 1 tablet (50 mg total) by mouth once daily., Disp: 90 tablet, Rfl: 3    multivit,iron,minerals/lutein (CENTRUM SILVER ULTRA WOMEN'S ORAL), Take by mouth., Disp: , Rfl:     pravastatin (PRAVACHOL) 40 MG tablet, Take 1 tablet (40 mg total) by mouth every evening., Disp: 90 tablet, Rfl: 3    sacubitriL-valsartan (ENTRESTO) 49-51 mg per tablet, Take 1 tablet by mouth 2 (two) times daily., Disp: 180 tablet, Rfl: 3    spironolactone (ALDACTONE) 25 MG tablet, Take 1 tablet (25 mg total) by mouth once daily., Disp: 90 tablet, Rfl: 3    diphenhydrAMINE (BENADRYL) 50 MG capsule, Take 1 capsule (50 mg total) by mouth nightly. for 14 days, Disp: 14 capsule, Rfl: 0    famotidine (PEPCID) 20 MG tablet, Take 1 tablet (20 mg total) by mouth 2 (two) times daily. for 14 days, Disp: 30 tablet, Rfl: 1    Review of Systems   Constitutional:  Negative for chills, diaphoresis, fever and malaise/fatigue.   Respiratory:   "Negative for cough and shortness of breath.    Cardiovascular:  Negative for chest pain, palpitations, orthopnea, leg swelling and PND.   Gastrointestinal:  Negative for abdominal pain, nausea and vomiting.   Musculoskeletal:  Negative for falls.   Neurological:  Negative for focal weakness and weakness.         Objective:   BP (!) 160/70   Pulse 75   Resp 18   Ht 5' 8" (1.727 m)   Wt 71.2 kg (157 lb)   BMI 23.87 kg/m²     Physical Exam  Constitutional:       General: She is not in acute distress.     Appearance: Normal appearance.   Cardiovascular:      Rate and Rhythm: Tachycardia present. Rhythm irregularly irregular.      Heart sounds: Normal heart sounds.   Pulmonary:      Effort: Pulmonary effort is normal.      Breath sounds: Normal breath sounds. No wheezing or rales.   Musculoskeletal:      Cervical back: Neck supple. No rigidity.      Right lower leg: No edema.      Left lower leg: No edema.   Skin:     General: Skin is warm and dry.   Neurological:      Mental Status: She is alert.           Assessment:     1. Acute on chronic systolic heart failure        2. Atrial fibrillation, unspecified type  EKG 12-lead    Basic Metabolic Panel      3. Hypertension, unspecified type              Plan:   Atrial fibrillation  - s/p ablation and ppm placement  by Dr. Ulloa  - remains on Eliquis  - Plan for PO benadryl 50mg qd and famotdine 20mg bid for allergic reaction    Acute on chronic heart failure  - EF 15-20%, BiV failure, bi-atrial enlargement  - NYHA Class II Stage C  - Euvolemic on exam  - Continue lasix 40mg daily, continue daily weights  - /70  - GDMT: On  Entresto to 49-51mg bid - Continue Toprol XL 50mg daily, Aldactone 25mg bid, Jardiance 10mg daily  - If EF does not improve; will need RT upgrade      HTN (hypertension)  Uncontrolled  Increase entresto to 49-51mg bid.              "

## 2025-03-14 NOTE — ASSESSMENT & PLAN NOTE
- s/p ablation and ppm placement  by Dr. Ulloa  - remains on Eliquis  - Plan for PO benadryl 50mg qd and famotdine 20mg bid for allergic reaction

## 2025-03-14 NOTE — ASSESSMENT & PLAN NOTE
- EF 15-20%, BiV failure, bi-atrial enlargement  - NYHA Class II Stage C  - Euvolemic on exam  - Continue lasix 40mg daily, continue daily weights  - /70  - GDMT: On  Entresto to 49-51mg bid - Continue Toprol XL 50mg daily, Aldactone 25mg bid, Jardiance 10mg daily  - If EF does not improve; will need RT upgrade

## 2025-03-17 ENCOUNTER — OFFICE VISIT (OUTPATIENT)
Dept: FAMILY MEDICINE | Facility: CLINIC | Age: 80
End: 2025-03-17
Payer: MEDICARE

## 2025-03-17 VITALS
BODY MASS INDEX: 24.1 KG/M2 | DIASTOLIC BLOOD PRESSURE: 64 MMHG | HEART RATE: 72 BPM | HEIGHT: 68 IN | WEIGHT: 159 LBS | OXYGEN SATURATION: 98 % | SYSTOLIC BLOOD PRESSURE: 134 MMHG | TEMPERATURE: 98 F | RESPIRATION RATE: 18 BRPM

## 2025-03-17 DIAGNOSIS — Z95.0 S/P PLACEMENT OF CARDIAC PACEMAKER: ICD-10-CM

## 2025-03-17 DIAGNOSIS — L23.9 ALLERGIC DERMATITIS: ICD-10-CM

## 2025-03-17 DIAGNOSIS — Z09 HOSPITAL DISCHARGE FOLLOW-UP: Primary | ICD-10-CM

## 2025-03-17 PROCEDURE — 99213 OFFICE O/P EST LOW 20 MIN: CPT | Mod: ,,, | Performed by: NURSE PRACTITIONER

## 2025-03-17 NOTE — PROGRESS NOTES
[facility]    Chief Complaint      Chief Complaint   Patient presents with    Hospital Follow Up     D/C: 02/29/25- afib, a- flutter, biventricular permanent pacemaker placement  D/C: 03/08/25- allergic reaction to vancomycin and/or iodine contrast media, admitted for observation       History of Present Illness      Flaquita Sosa is a 79 y.o. female. She  has a past medical history of Hyperlipidemia and Hypertension., who presents today for Hospital f/u.  Patient went in for a scheduled cardiac ablation and also ended up getting PM.  She had an allergic reaction to the Abx and was kept over night.  She had to return to the ER on 3/6/25 for continued complaints from the reaction.  She was held overnight for OBS. She again returned to the ER on 3/8/25 and was discharged home.     Past Medical History:  Past Medical History:   Diagnosis Date    Hyperlipidemia     Hypertension        Past Surgical History:   has a past surgical history that includes Oophorectomy and Hysterectomy.    Social History:  Social History[1]    I personally reviewed all past medical, surgical, and social.     Review of Systems   Constitutional:  Negative for fatigue.   HENT:  Negative for trouble swallowing.    Eyes:  Negative for visual disturbance.   Respiratory:  Negative for cough, shortness of breath and wheezing.    Cardiovascular: Negative.    Gastrointestinal:  Negative for abdominal pain, constipation and diarrhea.   Musculoskeletal:  Negative for gait problem.   Skin: Negative.  Negative for rash.        Mild itching   Neurological:  Negative for dizziness, weakness, light-headedness and numbness.        Medications:  Encounter Medications[2]    Allergies:  Review of patient's allergies indicates:   Allergen Reactions    Iodinated contrast media Rash    Vancomycin analogues Rash       Health Maintenance:  Immunization History   Administered Date(s) Administered    COVID-19 MRNA, LN-S PF (MODERNA HALF 0.25 ML DOSE)  "09/10/2021, 05/20/2022    COVID-19, MRNA, LN-S, PF (MODERNA FULL 0.5 ML DOSE) 02/09/2021, 03/15/2021    COVID-19, mRNA, LNP-S, bivalent booster, PF (Moderna Omicron)12 + YEARS 11/16/2022    DTaP (5 Pertussis Antigens) 05/31/2017    Influenza (FLUAD) - Quadrivalent - Adjuvanted - PF *Preferred* (65+) 10/18/2022    Influenza - Quadrivalent - PF *Preferred* (6 months and older) 10/17/2019    Influenza - Trivalent - Afluria, Fluzone MDV 10/14/2014    Influenza - Trivalent - Fluzone High Dose - PF (65 years and older) 10/27/2015, 10/28/2016, 10/27/2017, 10/26/2018, 09/24/2020, 11/02/2021    Pneumococcal Conjugate - 13 Valent 07/29/2019    Pneumococcal Polysaccharide - 23 Valent 11/27/2015    Zoster 10/12/2020, 12/11/2020      Health Maintenance   Topic Date Due    Hepatitis C Screening  Never done    DEXA Scan  Never done    TETANUS VACCINE  08/28/2017    Shingles Vaccine (2 of 3) 02/05/2021    RSV Vaccine (Age 60+ and Pregnant patients) (1 - 1-dose 75+ series) Never done    COVID-19 Vaccine (6 - 2024-25 season) 09/01/2024    Lipid Panel  12/16/2029    Influenza Vaccine  Completed    Pneumococcal Vaccines (Age 50+)  Completed        Physical Exam      Vital Signs  Temp: 98.3 °F (36.8 °C)  Temp Source: Oral  Pulse: 72  Resp: 18  SpO2: 98 %  BP: 134/64  BP Location: Left arm  Patient Position: Sitting  Pain Score: 0-No pain  Height and Weight  Height: 5' 8" (172.7 cm)  Weight: 72.1 kg (159 lb)  BSA (Calculated - sq m): 1.86 sq meters  BMI (Calculated): 24.2  Weight in (lb) to have BMI = 25: 164.1]    Physical Exam  Vitals and nursing note reviewed.   Constitutional:       Appearance: Normal appearance. She is well-developed.   HENT:      Head: Normocephalic.      Right Ear: Hearing normal.      Left Ear: Hearing normal.      Nose: Nose normal.   Eyes:      General: Lids are normal.      Conjunctiva/sclera: Conjunctivae normal.   Cardiovascular:      Rate and Rhythm: Normal rate and regular rhythm.      Heart sounds: Normal " heart sounds.      Comments: Steri-strips noted to L chest wall from PM placement  Pulmonary:      Effort: Pulmonary effort is normal.      Breath sounds: Normal breath sounds.   Musculoskeletal:         General: Normal range of motion.      Cervical back: Normal range of motion and neck supple.      Right lower leg: No edema.      Left lower leg: No edema.   Skin:     General: Skin is warm and dry.   Neurological:      Mental Status: She is alert and oriented to person, place, and time.      Gait: Gait is intact.   Psychiatric:         Behavior: Behavior is cooperative.          Laboratory:  CBC:  Recent Labs   Lab 02/26/25  1041 03/06/25  1339 03/08/25  0832   WBC 8.40 10.51 10.83   RBC 4.25 3.84 L 3.96 L   Hemoglobin 13.0 12.0 12.0   Hematocrit 42.6 37.7 L 38.8   Platelet Count 295 253 276   .2 H 98.2 H 98.0 H   MCH 30.6 31.3 H 30.3   MCHC 30.5 L 31.8 L 30.9 L     CMP:  Recent Labs   Lab 02/26/25  1041 03/06/25  1339 03/08/25  0832 03/20/25  1530   Glucose 87 140 H 95 85   Calcium 9.8 9.3 9.2 8.5   Albumin 4.1 3.7 3.6  --    Total Protein 7.4 7.0 6.8  --    Sodium 143 142 143 144   Potassium 4.7 4.8 4.3 4.2   CO2 27 29 26 29   Chloride 103 104 105 104   BUN 29 H 23 H 30 H 23 H   Alk Phos 109 105 96  --    ALT 29 19 18  --    AST 39 H 25 33  --    Bilirubin, Total 0.6 0.3 0.3  --      LIPIDS:      TSH:      A1C:        Assessment/Plan     Flaquita Sosa is a 79 y.o.female with:     1. Hospital discharge follow-up    2. S/P placement of cardiac pacemaker    3. Allergic dermatitis  Overview:  Unclear if due to vancomycin or contrast dye, will avoid both in the future.          Discharge medication list reconciled    Total time spent face-to-face and non-face-to-face coordinating care for this encounter was: 20 minutes     Chronic conditions status updated as per HPI.  Other than changes above, cont current medications and maintain follow up with specialists.  Return to clinic prn if symptoms worsen or fail  to improve.    Laura Carnes, MARYP  [facility]         [1]   Social History  Tobacco Use    Smoking status: Never     Passive exposure: Never    Smokeless tobacco: Never   Substance Use Topics    Alcohol use: Never    Drug use: Never   [2]   Outpatient Encounter Medications as of 3/17/2025   Medication Sig Dispense Refill    amiodarone (PACERONE) 200 MG Tab Take 1 tablet (200 mg total) by mouth once daily. 90 tablet 3    cholecalciferol, vitamin D3, (VITAMIN D3) 50 mcg (2,000 unit) Cap capsule Take 1 capsule by mouth once daily.      digoxin (LANOXIN) 125 mcg tablet Take 1 tablet (0.125 mg total) by mouth once daily. 30 tablet 1    diphenhydrAMINE (BENADRYL) 50 MG capsule Take 1 capsule (50 mg total) by mouth nightly. for 14 days 14 capsule 0    docusate sodium (COLACE) 100 MG capsule Take 1 capsule (100 mg total) by mouth 2 (two) times daily.      ELIQUIS 2.5 mg Tab Take 1 tablet (2.5 mg total) by mouth 2 (two) times daily. 180 tablet 3    famotidine (PEPCID) 20 MG tablet Take 1 tablet (20 mg total) by mouth 2 (two) times daily. for 14 days 30 tablet 1    ferrous sulfate 325 (65 FE) MG EC tablet Take 325 mg by mouth once daily.      furosemide (LASIX) 40 MG tablet Take 1 tablet (40 mg total) by mouth once daily. 90 tablet 3    JARDIANCE 10 mg tablet Take 1 tablet (10 mg total) by mouth once daily. 90 tablet 3    metoprolol succinate (TOPROL-XL) 50 MG 24 hr tablet Take 1 tablet (50 mg total) by mouth once daily. 90 tablet 3    multivit,iron,minerals/lutein (CENTRUM SILVER ULTRA WOMEN'S ORAL) Take by mouth.      pravastatin (PRAVACHOL) 40 MG tablet Take 1 tablet (40 mg total) by mouth every evening. 90 tablet 3    sacubitriL-valsartan (ENTRESTO) 49-51 mg per tablet Take 1 tablet by mouth 2 (two) times daily. 180 tablet 3    spironolactone (ALDACTONE) 25 MG tablet Take 1 tablet (25 mg total) by mouth once daily. 90 tablet 3    [] predniSONE (DELTASONE) 20 MG tablet Take 2 tablets (40 mg total) by mouth  once daily. for 5 days 10 tablet 0     No facility-administered encounter medications on file as of 3/17/2025.

## 2025-03-31 ENCOUNTER — TELEPHONE (OUTPATIENT)
Dept: CARDIOLOGY | Facility: CLINIC | Age: 80
End: 2025-03-31
Payer: MEDICARE

## 2025-03-31 NOTE — TELEPHONE ENCOUNTER
----- Message from Cece sent at 3/31/2025 11:39 AM CDT -----  Regarding: Message for Sandy  Who Called: Flaquita Kenny is returning phone callWho Left Message for Patient:Sandy AGUERO.Does the patient know what this is regarding?:Device check apptPreferred Method of Contact: Phone CallBest Call Back Number, if different: [phone] Information:  please call on cell

## 2025-03-31 NOTE — TELEPHONE ENCOUNTER
Called to get patient to reschedule device check to 4/2/25 instead of 4/3/25 due to miss nathan being out and  Paul will be here to check devices 8:30-9:30 on 4/2/25  Informed her callback is [phone] and we have 8:30 or a 9:30 available to check device  -HW

## 2025-03-31 NOTE — TELEPHONE ENCOUNTER
Returned call and patient rescheduled device check to 4/8/25 at 8:45 due to our only availability this week to be 4/2/25 and she goes back to work then.  -HW

## 2025-04-01 ENCOUNTER — RESULTS FOLLOW-UP (OUTPATIENT)
Dept: CARDIOLOGY | Facility: HOSPITAL | Age: 80
End: 2025-04-01

## 2025-04-08 ENCOUNTER — HOSPITAL ENCOUNTER (OUTPATIENT)
Dept: CARDIOLOGY | Facility: HOSPITAL | Age: 80
Discharge: HOME | End: 2025-04-08
Attending: STUDENT IN AN ORGANIZED HEALTH CARE EDUCATION/TRAINING PROGRAM
Payer: MEDICARE

## 2025-04-08 DIAGNOSIS — Z95.0 PRESENCE OF CARDIAC PACEMAKER: ICD-10-CM

## 2025-04-16 DIAGNOSIS — Z95.0 PRESENCE OF CARDIAC PACEMAKER: Primary | ICD-10-CM

## 2025-04-28 ENCOUNTER — OFFICE VISIT (OUTPATIENT)
Dept: FAMILY MEDICINE | Facility: CLINIC | Age: 80
End: 2025-04-28
Payer: MEDICARE

## 2025-04-28 VITALS
WEIGHT: 151 LBS | TEMPERATURE: 99 F | SYSTOLIC BLOOD PRESSURE: 110 MMHG | DIASTOLIC BLOOD PRESSURE: 62 MMHG | HEART RATE: 76 BPM | HEIGHT: 68 IN | BODY MASS INDEX: 22.88 KG/M2 | OXYGEN SATURATION: 98 % | RESPIRATION RATE: 18 BRPM

## 2025-04-28 DIAGNOSIS — J32.9 SINUSITIS, UNSPECIFIED CHRONICITY, UNSPECIFIED LOCATION: Primary | ICD-10-CM

## 2025-04-28 DIAGNOSIS — R09.81 SINUS CONGESTION: ICD-10-CM

## 2025-04-28 LAB
CTP QC/QA: YES
CTP QC/QA: YES
POC MOLECULAR INFLUENZA A AGN: NEGATIVE
POC MOLECULAR INFLUENZA B AGN: NEGATIVE
SARS-COV-2 RDRP RESP QL NAA+PROBE: NEGATIVE

## 2025-04-28 PROCEDURE — 87502 INFLUENZA DNA AMP PROBE: CPT | Mod: RHCUB | Performed by: NURSE PRACTITIONER

## 2025-04-28 PROCEDURE — 87635 SARS-COV-2 COVID-19 AMP PRB: CPT | Mod: RHCUB | Performed by: NURSE PRACTITIONER

## 2025-04-28 PROCEDURE — 99213 OFFICE O/P EST LOW 20 MIN: CPT | Mod: ,,, | Performed by: NURSE PRACTITIONER

## 2025-04-28 RX ORDER — PREDNISONE 5 MG/1
5 TABLET ORAL DAILY
Qty: 5 TABLET | Refills: 0 | Status: SHIPPED | OUTPATIENT
Start: 2025-04-28 | End: 2025-05-03

## 2025-04-28 RX ORDER — AMOXICILLIN AND CLAVULANATE POTASSIUM 875; 125 MG/1; MG/1
1 TABLET, FILM COATED ORAL 2 TIMES DAILY
Qty: 14 TABLET | Refills: 0 | Status: SHIPPED | OUTPATIENT
Start: 2025-04-28 | End: 2025-05-05

## 2025-04-28 NOTE — PROGRESS NOTES
[facility]    Chief Complaint      Chief Complaint   Patient presents with    Sinusitis     Pt present to clinic sinus issues, symptoms been present for 2 weeks.    Nasal Congestion    Ear Fullness       History of Present Illness      Flaquita Sosa is a 79 y.o. female. She  has a past medical history of Hyperlipidemia and Hypertension., who presents today for URI type symptoms- sinus pressure, congestion, ear pain x2 weeks.  Reports taking Coricidin and it helped while taking but once she stopped the symptoms returned.     Past Medical History:  Past Medical History:   Diagnosis Date    Hyperlipidemia     Hypertension        Past Surgical History:   has a past surgical history that includes Oophorectomy and Hysterectomy.    Social History:  Social History[1]    I personally reviewed all past medical, surgical, and social.     Review of Systems   Constitutional:  Negative for chills, fatigue and fever.   HENT:  Positive for congestion, ear pain and sinus pressure. Negative for sore throat.    Respiratory:  Negative for cough.    Gastrointestinal:  Negative for diarrhea, nausea and vomiting.   Musculoskeletal:  Negative for myalgias.   Skin:  Negative for rash.   Neurological:  Negative for headaches.        Medications:  Encounter Medications[2]    Allergies:  Review of patient's allergies indicates:   Allergen Reactions    Iodinated contrast media Rash    Vancomycin analogues Rash       Health Maintenance:  Immunization History   Administered Date(s) Administered    COVID-19 MRNA, LN-S PF (MODERNA HALF 0.25 ML DOSE) 09/10/2021, 05/20/2022    COVID-19, MRNA, LN-S, PF (MODERNA FULL 0.5 ML DOSE) 02/09/2021, 03/15/2021    COVID-19, mRNA, LNP-S, bivalent booster, PF (Moderna Omicron)12 + YEARS 11/16/2022    DTaP (5 Pertussis Antigens) 05/31/2017    Influenza (FLUAD) - Quadrivalent - Adjuvanted - PF *Preferred* (65+) 10/18/2022    Influenza - Quadrivalent - PF *Preferred* (6 months and older) 10/17/2019     "Influenza - Trivalent - Afluria, Fluzone MDV 10/14/2014    Influenza - Trivalent - Fluzone High Dose - PF (65 years and older) 10/27/2015, 10/28/2016, 10/27/2017, 10/26/2018, 09/24/2020, 11/02/2021    Pneumococcal Conjugate - 13 Valent 07/29/2019    Pneumococcal Polysaccharide - 23 Valent 11/27/2015    Zoster 10/12/2020, 12/11/2020      Health Maintenance   Topic Date Due    Hepatitis C Screening  Never done    DEXA Scan  Never done    TETANUS VACCINE  08/28/2017    Shingles Vaccine (2 of 3) 02/05/2021    RSV Vaccine (Age 60+ and Pregnant patients) (1 - 1-dose 75+ series) Never done    COVID-19 Vaccine (6 - 2024-25 season) 09/01/2024    Lipid Panel  12/16/2029    Influenza Vaccine  Completed    Pneumococcal Vaccines (Age 50+)  Completed        Physical Exam      Vital Signs  Temp: 98.6 °F (37 °C)  Temp Source: Oral  Pulse: 76  Resp: 18  SpO2: 98 %  BP: 110/62  BP Location: Left arm  Patient Position: Sitting  Pain Score: 0-No pain  Height and Weight  Height: 5' 8" (172.7 cm)  Weight: 68.5 kg (151 lb)  BSA (Calculated - sq m): 1.81 sq meters  BMI (Calculated): 23  Weight in (lb) to have BMI = 25: 164.1]    Physical Exam  Vitals and nursing note reviewed.   Constitutional:       Appearance: Normal appearance. She is well-developed.   HENT:      Head: Normocephalic.      Right Ear: Hearing and external ear normal.      Left Ear: Hearing and external ear normal.      Nose: Congestion present.      Right Sinus: Maxillary sinus tenderness present.      Left Sinus: Maxillary sinus tenderness present.      Mouth/Throat:      Pharynx: Oropharynx is clear.   Eyes:      General: Lids are normal.      Conjunctiva/sclera: Conjunctivae normal.   Cardiovascular:      Rate and Rhythm: Normal rate and regular rhythm.      Heart sounds: Normal heart sounds.   Pulmonary:      Effort: Pulmonary effort is normal.      Breath sounds: Normal breath sounds.   Musculoskeletal:         General: Normal range of motion.      Cervical back: " Normal range of motion and neck supple.      Right lower leg: No edema.      Left lower leg: No edema.   Skin:     General: Skin is warm and dry.   Neurological:      Mental Status: She is alert and oriented to person, place, and time.      Gait: Gait is intact.   Psychiatric:         Behavior: Behavior is cooperative.          Laboratory:  CBC:  Recent Labs   Lab 02/26/25  1041 03/06/25  1339 03/08/25  0832   WBC 8.40 10.51 10.83   RBC 4.25 3.84 L 3.96 L   Hemoglobin 13.0 12.0 12.0   Hematocrit 42.6 37.7 L 38.8   Platelet Count 295 253 276   .2 H 98.2 H 98.0 H   MCH 30.6 31.3 H 30.3   MCHC 30.5 L 31.8 L 30.9 L     CMP:  Recent Labs   Lab 02/26/25  1041 03/06/25  1339 03/08/25  0832 03/20/25  1530   Glucose 87 140 H 95 85   Calcium 9.8 9.3 9.2 8.5   Albumin 4.1 3.7 3.6  --    Total Protein 7.4 7.0 6.8  --    Sodium 143 142 143 144   Potassium 4.7 4.8 4.3 4.2   CO2 27 29 26 29   Chloride 103 104 105 104   BUN 29 H 23 H 30 H 23 H   Alk Phos 109 105 96  --    ALT 29 19 18  --    AST 39 H 25 33  --    Bilirubin, Total 0.6 0.3 0.3  --      LIPIDS:      TSH:      A1C:        Assessment/Plan     Flaquita Sosa is a 79 y.o.female with:     1. Sinusitis, unspecified chronicity, unspecified location  -     amoxicillin-clavulanate 875-125mg (AUGMENTIN) 875-125 mg per tablet; Take 1 tablet by mouth 2 (two) times daily. for 7 days  Dispense: 14 tablet; Refill: 0  -     predniSONE (DELTASONE) 5 MG tablet; Take 1 tablet (5 mg total) by mouth once daily. for 5 days  Dispense: 5 tablet; Refill: 0    2. Sinus congestion  -     POCT COVID-19 Rapid Screening  -     POCT Influenza A/B Molecular  -     predniSONE (DELTASONE) 5 MG tablet; Take 1 tablet (5 mg total) by mouth once daily. for 5 days  Dispense: 5 tablet; Refill: 0         Total time spent face-to-face and non-face-to-face coordinating care for this encounter was: 20 minutes    Chronic conditions status updated as per HPI.  Other than changes above, cont current  medications and maintain follow up with specialists.  Return to clinic prn if symptoms worsen or fail to improve.    Laura Carnes, FNP  [facility]         [1]   Social History  Tobacco Use    Smoking status: Never     Passive exposure: Never    Smokeless tobacco: Never   Substance Use Topics    Alcohol use: Never    Drug use: Never   [2]   Outpatient Encounter Medications as of 4/28/2025   Medication Sig Dispense Refill    amiodarone (PACERONE) 200 MG Tab Take 1 tablet (200 mg total) by mouth once daily. 90 tablet 3    cholecalciferol, vitamin D3, (VITAMIN D3) 50 mcg (2,000 unit) Cap capsule Take 1 capsule by mouth once daily.      digoxin (LANOXIN) 125 mcg tablet Take 1 tablet (0.125 mg total) by mouth once daily. 30 tablet 1    docusate sodium (COLACE) 100 MG capsule Take 1 capsule (100 mg total) by mouth 2 (two) times daily.      ELIQUIS 2.5 mg Tab Take 1 tablet (2.5 mg total) by mouth 2 (two) times daily. 180 tablet 3    ferrous sulfate 325 (65 FE) MG EC tablet Take 325 mg by mouth once daily.      furosemide (LASIX) 40 MG tablet Take 1 tablet (40 mg total) by mouth once daily. 90 tablet 3    JARDIANCE 10 mg tablet Take 1 tablet (10 mg total) by mouth once daily. 90 tablet 3    metoprolol succinate (TOPROL-XL) 50 MG 24 hr tablet Take 1 tablet (50 mg total) by mouth once daily. 90 tablet 3    multivit,iron,minerals/lutein (CENTRUM SILVER ULTRA WOMEN'S ORAL) Take by mouth.      pravastatin (PRAVACHOL) 40 MG tablet Take 1 tablet (40 mg total) by mouth every evening. 90 tablet 3    sacubitriL-valsartan (ENTRESTO) 49-51 mg per tablet Take 1 tablet by mouth 2 (two) times daily. 180 tablet 3    spironolactone (ALDACTONE) 25 MG tablet Take 1 tablet (25 mg total) by mouth once daily. 90 tablet 3    amoxicillin-clavulanate 875-125mg (AUGMENTIN) 875-125 mg per tablet Take 1 tablet by mouth 2 (two) times daily. for 7 days 14 tablet 0    famotidine (PEPCID) 20 MG tablet Take 1 tablet (20 mg total) by mouth 2 (two)  times daily. for 14 days 30 tablet 1    predniSONE (DELTASONE) 5 MG tablet Take 1 tablet (5 mg total) by mouth once daily. for 5 days 5 tablet 0     No facility-administered encounter medications on file as of 4/28/2025.

## 2025-05-02 ENCOUNTER — HOSPITAL ENCOUNTER (OUTPATIENT)
Dept: CARDIOLOGY | Facility: HOSPITAL | Age: 80
Discharge: HOME | End: 2025-05-02
Attending: STUDENT IN AN ORGANIZED HEALTH CARE EDUCATION/TRAINING PROGRAM
Payer: MEDICARE

## 2025-05-02 ENCOUNTER — CLINICAL SUPPORT (OUTPATIENT)
Dept: CARDIOLOGY | Facility: HOSPITAL | Age: 80
End: 2025-05-02

## 2025-05-24 ENCOUNTER — OFFICE VISIT (OUTPATIENT)
Dept: FAMILY MEDICINE | Facility: CLINIC | Age: 80
End: 2025-05-24
Payer: MEDICARE

## 2025-05-24 VITALS
BODY MASS INDEX: 23.34 KG/M2 | SYSTOLIC BLOOD PRESSURE: 147 MMHG | TEMPERATURE: 98 F | HEART RATE: 76 BPM | WEIGHT: 154 LBS | DIASTOLIC BLOOD PRESSURE: 74 MMHG | HEIGHT: 68 IN | OXYGEN SATURATION: 98 % | RESPIRATION RATE: 20 BRPM

## 2025-05-24 DIAGNOSIS — R05.9 COUGH, UNSPECIFIED TYPE: ICD-10-CM

## 2025-05-24 DIAGNOSIS — J32.9 SINUSITIS, UNSPECIFIED CHRONICITY, UNSPECIFIED LOCATION: Primary | ICD-10-CM

## 2025-05-24 PROBLEM — J18.9 PNEUMONIA: Status: ACTIVE | Noted: 2024-12-21

## 2025-05-24 PROBLEM — R11.0 NAUSEA: Status: ACTIVE | Noted: 2025-01-07

## 2025-05-24 PROBLEM — I50.23 ACUTE ON CHRONIC HFREF (HEART FAILURE WITH REDUCED EJECTION FRACTION): Status: ACTIVE | Noted: 2025-01-07

## 2025-05-24 PROBLEM — N17.9 ACUTE KIDNEY INJURY: Status: ACTIVE | Noted: 2025-01-07

## 2025-05-24 PROBLEM — Z98.890 S/P ABLATION OF ATRIAL FIBRILLATION: Status: ACTIVE | Noted: 2025-02-28

## 2025-05-24 PROBLEM — I48.0 PAROXYSMAL A-FIB: Status: ACTIVE | Noted: 2024-12-15

## 2025-05-24 PROBLEM — Z86.79 S/P ABLATION OF ATRIAL FIBRILLATION: Status: ACTIVE | Noted: 2025-02-28

## 2025-05-24 LAB
CTP QC/QA: YES
POC MOLECULAR INFLUENZA A AGN: NEGATIVE
POC MOLECULAR INFLUENZA B AGN: NEGATIVE
POC RSV RAPID ANT MOLECULAR: NEGATIVE
SARS-COV-2 RDRP RESP QL NAA+PROBE: NEGATIVE

## 2025-05-24 PROCEDURE — 87502 INFLUENZA DNA AMP PROBE: CPT | Mod: RHCUB | Performed by: NURSE PRACTITIONER

## 2025-05-24 PROCEDURE — 96372 THER/PROPH/DIAG INJ SC/IM: CPT | Mod: ,,, | Performed by: NURSE PRACTITIONER

## 2025-05-24 PROCEDURE — 87634 RSV DNA/RNA AMP PROBE: CPT | Mod: RHCUB | Performed by: NURSE PRACTITIONER

## 2025-05-24 PROCEDURE — 87635 SARS-COV-2 COVID-19 AMP PRB: CPT | Mod: RHCUB | Performed by: NURSE PRACTITIONER

## 2025-05-24 PROCEDURE — 99214 OFFICE O/P EST MOD 30 MIN: CPT | Mod: ,,, | Performed by: NURSE PRACTITIONER

## 2025-05-24 RX ORDER — AMOXICILLIN AND CLAVULANATE POTASSIUM 875; 125 MG/1; MG/1
1 TABLET, FILM COATED ORAL 2 TIMES DAILY
Qty: 20 TABLET | Refills: 0 | Status: SHIPPED | OUTPATIENT
Start: 2025-05-24 | End: 2025-06-03

## 2025-05-24 RX ORDER — VALACYCLOVIR HYDROCHLORIDE 500 MG/1
500 TABLET, FILM COATED ORAL
COMMUNITY
Start: 2025-04-12

## 2025-05-24 RX ORDER — CEFTRIAXONE 1 G/1
1 INJECTION, POWDER, FOR SOLUTION INTRAMUSCULAR; INTRAVENOUS
Status: COMPLETED | OUTPATIENT
Start: 2025-05-24 | End: 2025-05-24

## 2025-05-24 RX ADMIN — CEFTRIAXONE 1 G: 1 INJECTION, POWDER, FOR SOLUTION INTRAMUSCULAR; INTRAVENOUS at 07:05

## 2025-05-25 NOTE — PROGRESS NOTES
"Subjective:       Patient ID: Flaquita Sosa is a 79 y.o. female.    Chief Complaint: Sinusitis (CHEST CONGESTION AND BAD COUGH WITH CLEAR ,YELLOWISH MUCUS )    Presents to clinic as above.  No fever. No SOB. Hx of A-fib. Has a pacemaker.     Sinusitis  Associated symptoms include congestion, coughing and headaches. Pertinent negatives include no shortness of breath.     Review of Systems   Constitutional: Negative.    HENT:  Positive for congestion and sinus pain.    Respiratory:  Positive for cough and sputum production. Negative for hemoptysis, shortness of breath and wheezing.    Cardiovascular: Negative.    Gastrointestinal: Negative.    Neurological:  Positive for headaches.          Reviewed family, medical, surgical, and social history.    Objective:      BP (!) 147/74 (BP Location: Left arm, Patient Position: Sitting)   Pulse 76   Temp 98.1 °F (36.7 °C) (Oral)   Resp 20   Ht 5' 8" (1.727 m)   Wt 69.9 kg (154 lb)   SpO2 98%   BMI 23.42 kg/m²   Physical Exam  Vitals and nursing note reviewed.   Constitutional:       General: She is not in acute distress.     Appearance: Normal appearance. She is not ill-appearing, toxic-appearing or diaphoretic.   HENT:      Head: Normocephalic and atraumatic.      Right Ear: Hearing, tympanic membrane, ear canal and external ear normal.      Left Ear: Hearing, tympanic membrane, ear canal and external ear normal.      Nose: Nasal tenderness, mucosal edema, congestion and rhinorrhea present. Rhinorrhea is purulent.      Right Turbinates: Enlarged and swollen.      Left Turbinates: Enlarged and swollen.      Right Sinus: Maxillary sinus tenderness and frontal sinus tenderness present.      Left Sinus: Maxillary sinus tenderness and frontal sinus tenderness present.      Mouth/Throat:      Mouth: Mucous membranes are moist.   Cardiovascular:      Rate and Rhythm: Normal rate and regular rhythm.      Heart sounds: Normal heart sounds.   Pulmonary:      Effort: Pulmonary " effort is normal.      Breath sounds: Normal breath sounds.   Musculoskeletal:      Cervical back: Normal range of motion and neck supple. No rigidity or tenderness.   Lymphadenopathy:      Cervical: No cervical adenopathy.   Skin:     General: Skin is warm and dry.      Capillary Refill: Capillary refill takes less than 2 seconds.   Neurological:      Mental Status: She is alert and oriented to person, place, and time.   Psychiatric:         Mood and Affect: Mood normal.         Behavior: Behavior normal.         Thought Content: Thought content normal.         Judgment: Judgment normal.            Office Visit on 05/24/2025   Component Date Value Ref Range Status    POC Rapid COVID 05/24/2025 Negative  Negative Final     Acceptable 05/24/2025 Yes   Final    POC Molecular Influenza A Ag 05/24/2025 Negative  Negative Final    POC Molecular Influenza B Ag 05/24/2025 Negative  Negative Final     Acceptable 05/24/2025 Yes   Final    POC RSV Rapid Ant Molecular 05/24/2025 Negative  Negative Final     Acceptable 05/24/2025 Yes   Final      Assessment:       1. Sinusitis, unspecified chronicity, unspecified location    2. Cough, unspecified type        Plan:       Sinusitis, unspecified chronicity, unspecified location  -     cefTRIAXone injection 1 g  -     amoxicillin-clavulanate 875-125mg (AUGMENTIN) 875-125 mg per tablet; Take 1 tablet by mouth 2 (two) times daily. for 10 days  Dispense: 20 tablet; Refill: 0    Cough, unspecified type  -     POCT COVID-19 Rapid Screening  -     POCT Influenza A/B Molecular  -     POCT respiratory syncytial virus    RTC PRN          Risks, benefits, and side effects were discussed with the patient. All questions were answered to the fullest satisfaction of the patient, and pt verbalized understanding and agreement to treatment plan. Pt was to call with any new or worsening symptoms, or present to the ER.

## 2025-06-06 DIAGNOSIS — I48.11 LONGSTANDING PERSISTENT ATRIAL FIBRILLATION: ICD-10-CM

## 2025-06-06 DIAGNOSIS — I50.23 ACUTE ON CHRONIC SYSTOLIC HEART FAILURE: ICD-10-CM

## 2025-06-06 RX ORDER — DIGOXIN 125 MCG
0.12 TABLET ORAL DAILY
Qty: 90 TABLET | Refills: 3 | Status: SHIPPED | OUTPATIENT
Start: 2025-06-06 | End: 2026-06-06

## 2025-06-16 ENCOUNTER — OFFICE VISIT (OUTPATIENT)
Dept: FAMILY MEDICINE | Facility: CLINIC | Age: 80
End: 2025-06-16
Payer: MEDICARE

## 2025-06-16 VITALS
BODY MASS INDEX: 22.88 KG/M2 | HEART RATE: 72 BPM | WEIGHT: 151 LBS | HEIGHT: 68 IN | TEMPERATURE: 98 F | SYSTOLIC BLOOD PRESSURE: 150 MMHG | OXYGEN SATURATION: 95 % | RESPIRATION RATE: 15 BRPM | DIASTOLIC BLOOD PRESSURE: 62 MMHG

## 2025-06-16 DIAGNOSIS — K59.04 CHRONIC IDIOPATHIC CONSTIPATION: Primary | ICD-10-CM

## 2025-06-16 DIAGNOSIS — M54.2 NECK PAIN ON LEFT SIDE: ICD-10-CM

## 2025-06-16 PROCEDURE — 99213 OFFICE O/P EST LOW 20 MIN: CPT | Mod: ,,, | Performed by: NURSE PRACTITIONER

## 2025-06-16 RX ORDER — TIZANIDINE 4 MG/1
4 TABLET ORAL EVERY 6 HOURS PRN
Qty: 30 TABLET | Refills: 0 | Status: SHIPPED | OUTPATIENT
Start: 2025-06-16 | End: 2025-06-26

## 2025-06-16 NOTE — PROGRESS NOTES
[facility]    Chief Complaint      Chief Complaint   Patient presents with    Hypertension     X3 month FU,     Neck Pain     Patient reports neck pain on the left side. States she thinks she she pulled a muscle        History of Present Illness      Flaquita Sosa is a 79 y.o. female. She  has a past medical history of Hyperlipidemia and Hypertension., who presents today for 3 mo f/u of HTN- BP mildly elevated today- 158/71. Also complaining of L sided neck pain.    Past Medical History:  Past Medical History:   Diagnosis Date    Hyperlipidemia     Hypertension        Past Surgical History:   has a past surgical history that includes Oophorectomy and Hysterectomy.    Social History:  Social History[1]    I personally reviewed all past medical, surgical, and social.     Review of Systems   Constitutional:  Negative for chills, fatigue and fever.   Eyes:  Negative for visual disturbance.   Respiratory:  Negative for cough and shortness of breath.    Cardiovascular: Negative.    Gastrointestinal:  Negative for abdominal pain, constipation and diarrhea.   Genitourinary:  Negative for difficulty urinating.   Musculoskeletal:  Negative for gait problem.   Skin: Negative.    Neurological:  Negative for dizziness, light-headedness and headaches.        Medications:  Encounter Medications[2]    Allergies:  Review of patient's allergies indicates:   Allergen Reactions    Iodinated contrast media Rash    Vancomycin analogues Rash       Health Maintenance:  Immunization History   Administered Date(s) Administered    COVID-19 MRNA, LN-S PF (MODERNA HALF 0.25 ML DOSE) 09/10/2021, 05/20/2022    COVID-19, MRNA, LN-S, PF (MODERNA FULL 0.5 ML DOSE) 02/09/2021, 03/15/2021    COVID-19, mRNA, LNP-S, bivalent booster, PF (Moderna Omicron)12 + YEARS 11/16/2022    DTaP (5 Pertussis Antigens) 05/31/2017    Influenza (FLUAD) - Quadrivalent - Adjuvanted - PF *Preferred* (65+) 10/18/2022    Influenza - Quadrivalent - PF *Preferred*  "(6 months and older) 10/17/2019    Influenza - Trivalent - Afluria, Fluzone MDV 10/14/2014    Influenza - Trivalent - Fluzone High Dose - PF (65 years and older) 10/27/2015, 10/28/2016, 10/27/2017, 10/26/2018, 09/24/2020, 11/02/2021    Pneumococcal Conjugate - 13 Valent 07/29/2019    Pneumococcal Polysaccharide - 23 Valent 11/27/2015    Zoster 10/12/2020, 12/11/2020      Health Maintenance   Topic Date Due    Hepatitis C Screening  Never done    DEXA Scan  Never done    TETANUS VACCINE  08/28/2017    Shingles Vaccine (2 of 3) 02/05/2021    RSV Vaccine (Age 60+ and Pregnant patients) (1 - 1-dose 75+ series) Never done    COVID-19 Vaccine (6 - 2024-25 season) 09/01/2024    Lipid Panel  12/16/2029    Influenza Vaccine  Completed    Pneumococcal Vaccines (Age 50+)  Completed        Physical Exam      Vital Signs  Temp: 98 °F (36.7 °C)  Temp Source: Oral  Pulse: 72  Resp: 15  SpO2: 95 %  BP: (!) 150/62  BP Location: Left arm  Patient Position: Sitting  Pain Score: 0-No pain  Height and Weight  Height: 5' 8" (172.7 cm)  Weight: 68.5 kg (151 lb)  BSA (Calculated - sq m): 1.81 sq meters  BMI (Calculated): 23  Weight in (lb) to have BMI = 25: 164.1]    Physical Exam  Vitals and nursing note reviewed.   Constitutional:       Appearance: Normal appearance. She is well-developed.   HENT:      Head: Normocephalic.      Right Ear: Hearing normal.      Left Ear: Hearing normal.      Nose: Nose normal.   Eyes:      General: Lids are normal.      Conjunctiva/sclera: Conjunctivae normal.   Cardiovascular:      Rate and Rhythm: Normal rate and regular rhythm.      Heart sounds: Normal heart sounds.   Pulmonary:      Effort: Pulmonary effort is normal.      Breath sounds: Normal breath sounds.   Musculoskeletal:         General: Normal range of motion.      Cervical back: Normal range of motion and neck supple.      Right lower leg: No edema.      Left lower leg: No edema.   Skin:     General: Skin is warm and dry.   Neurological:      " Mental Status: She is alert and oriented to person, place, and time.      Gait: Gait is intact.   Psychiatric:         Behavior: Behavior is cooperative.          Laboratory:  CBC:  Recent Labs   Lab 02/26/25  1041 03/06/25  1339 03/08/25  0832   WBC 8.40 10.51 10.83   RBC 4.25 3.84 L 3.96 L   Hemoglobin 13.0 12.0 12.0   Hematocrit 42.6 37.7 L 38.8   Platelet Count 295 253 276   .2 H 98.2 H 98.0 H   MCH 30.6 31.3 H 30.3   MCHC 30.5 L 31.8 L 30.9 L     CMP:  Recent Labs   Lab 02/26/25  1041 03/06/25  1339 03/08/25  0832 03/20/25  1530   Glucose 87 140 H 95 85   Calcium 9.8 9.3 9.2 8.5   Albumin 4.1 3.7 3.6  --    Total Protein 7.4 7.0 6.8  --    Sodium 143 142 143 144   Potassium 4.7 4.8 4.3 4.2   CO2 27 29 26 29   Chloride 103 104 105 104   BUN 29 H 23 H 30 H 23 H   Alk Phos 109 105 96  --    ALT 29 19 18  --    AST 39 H 25 33  --    Bilirubin, Total 0.6 0.3 0.3  --      LIPIDS:      TSH:      A1C:        Assessment/Plan     Flaquita Sosa is a 79 y.o.female with:     1. Chronic idiopathic constipation  -     linaCLOtide (LINZESS) 145 mcg Cap capsule; Take 1 capsule (145 mcg total) by mouth before breakfast.  Dispense: 30 capsule; Refill: 2    2. Neck pain on left side  -     tiZANidine (ZANAFLEX) 4 MG tablet; Take 1 tablet (4 mg total) by mouth every 6 (six) hours as needed (Muscle pain).  Dispense: 30 tablet; Refill: 0       Sees Cardiology- has upcoming appt with Dr. Deluca this month and has a f/u in [place] in 6 mo      Total time spent face-to-face and non-face-to-face coordinating care for this encounter was: 15 minutes     Chronic conditions status updated as per HPI.  Other than changes above, cont current medications and maintain follow up with specialists.  Return to clinic in 6 month(s).    ALVARO Vanegas  [facility]         [1]   Social History  Tobacco Use    Smoking status: Never     Passive exposure: Never    Smokeless tobacco: Never   Substance Use Topics    Alcohol use:  Never    Drug use: Never   [2]   Outpatient Encounter Medications as of 2025   Medication Sig Dispense Refill    cholecalciferol, vitamin D3, (VITAMIN D3) 50 mcg (2,000 unit) Cap capsule Take 1 capsule by mouth once daily.      digoxin (LANOXIN) 125 mcg tablet Take 1 tablet (0.125 mg total) by mouth once daily. 90 tablet 3    docusate sodium (COLACE) 100 MG capsule Take 1 capsule (100 mg total) by mouth 2 (two) times daily.      ELIQUIS 2.5 mg Tab Take 1 tablet (2.5 mg total) by mouth 2 (two) times daily. 180 tablet 3    ferrous sulfate 325 (65 FE) MG EC tablet Take 325 mg by mouth once daily.      furosemide (LASIX) 40 MG tablet Take 1 tablet (40 mg total) by mouth once daily. 90 tablet 3    JARDIANCE 10 mg tablet Take 1 tablet (10 mg total) by mouth once daily. 90 tablet 3    metoprolol succinate (TOPROL-XL) 50 MG 24 hr tablet Take 1 tablet (50 mg total) by mouth once daily. 90 tablet 3    multivit,iron,minerals/lutein (CENTRUM SILVER ULTRA WOMEN'S ORAL) Take by mouth.      pravastatin (PRAVACHOL) 40 MG tablet Take 1 tablet (40 mg total) by mouth every evening. 90 tablet 3    sacubitriL-valsartan (ENTRESTO) 49-51 mg per tablet Take 1 tablet by mouth 2 (two) times daily. 180 tablet 3    spironolactone (ALDACTONE) 25 MG tablet Take 1 tablet (25 mg total) by mouth once daily. 90 tablet 3    valACYclovir (VALTREX) 500 MG tablet Take 500 mg by mouth.      [DISCONTINUED] linaCLOtide (LINZESS) 145 mcg Cap capsule Take 145 mcg by mouth before breakfast.      [] amoxicillin-clavulanate 875-125mg (AUGMENTIN) 875-125 mg per tablet Take 1 tablet by mouth 2 (two) times daily. for 10 days 20 tablet 0    famotidine (PEPCID) 20 MG tablet Take 1 tablet (20 mg total) by mouth 2 (two) times daily. for 14 days 30 tablet 1    linaCLOtide (LINZESS) 145 mcg Cap capsule Take 1 capsule (145 mcg total) by mouth before breakfast. 30 capsule 2    tiZANidine (ZANAFLEX) 4 MG tablet Take 1 tablet (4 mg total) by mouth every 6 (six)  hours as needed (Muscle pain). 30 tablet 0    [DISCONTINUED] amiodarone (PACERONE) 200 MG Tab Take 1 tablet (200 mg total) by mouth once daily. (Patient not taking: Reported on 6/16/2025) 90 tablet 3    [DISCONTINUED] digoxin (LANOXIN) 125 mcg tablet Take 1 tablet (0.125 mg total) by mouth once daily. 30 tablet 1     No facility-administered encounter medications on file as of 6/16/2025.

## 2025-06-25 ENCOUNTER — HOSPITAL ENCOUNTER (OUTPATIENT)
Dept: CARDIOLOGY | Facility: HOSPITAL | Age: 80
Discharge: HOME | End: 2025-06-25
Attending: STUDENT IN AN ORGANIZED HEALTH CARE EDUCATION/TRAINING PROGRAM
Payer: MEDICARE

## 2025-06-25 ENCOUNTER — OFFICE VISIT (OUTPATIENT)
Dept: CARDIOLOGY | Facility: CLINIC | Age: 80
End: 2025-06-25
Payer: MEDICARE

## 2025-06-25 VITALS
RESPIRATION RATE: 18 BRPM | DIASTOLIC BLOOD PRESSURE: 80 MMHG | WEIGHT: 151 LBS | SYSTOLIC BLOOD PRESSURE: 128 MMHG | BODY MASS INDEX: 22.88 KG/M2 | HEIGHT: 68 IN | HEART RATE: 74 BPM

## 2025-06-25 DIAGNOSIS — I50.22 CHRONIC SYSTOLIC HEART FAILURE: Primary | ICD-10-CM

## 2025-06-25 DIAGNOSIS — R63.4 WEIGHT LOSS: ICD-10-CM

## 2025-06-25 DIAGNOSIS — Z95.0 PRESENCE OF CARDIAC PACEMAKER: ICD-10-CM

## 2025-06-25 DIAGNOSIS — I48.0 PAROXYSMAL A-FIB: ICD-10-CM

## 2025-06-25 PROBLEM — I50.42 CHRONIC COMBINED SYSTOLIC AND DIASTOLIC HEART FAILURE: Status: ACTIVE | Noted: 2025-01-07

## 2025-06-25 LAB
BATTERY VOLTAGE (V): 3.19 V
ERI (V): 2.6 V
OHS CV DC PP MS1: 0.4 MS
OHS CV DC PP MS2: 0.4 MS
OHS CV DC PP V1: 1.5 V
OHS CV DC PP V2: 2 V

## 2025-06-25 PROCEDURE — 99999 PR PBB SHADOW E&M-EST. PATIENT-LVL IV: CPT | Mod: PBBFAC,,, | Performed by: STUDENT IN AN ORGANIZED HEALTH CARE EDUCATION/TRAINING PROGRAM

## 2025-06-25 PROCEDURE — 93281 PM DEVICE PROGR EVAL MULTI: CPT | Mod: 26,,, | Performed by: STUDENT IN AN ORGANIZED HEALTH CARE EDUCATION/TRAINING PROGRAM

## 2025-06-25 PROCEDURE — 93281 PM DEVICE PROGR EVAL MULTI: CPT

## 2025-06-25 PROCEDURE — 99214 OFFICE O/P EST MOD 30 MIN: CPT | Mod: PBBFAC,25 | Performed by: STUDENT IN AN ORGANIZED HEALTH CARE EDUCATION/TRAINING PROGRAM

## 2025-06-25 PROCEDURE — 99214 OFFICE O/P EST MOD 30 MIN: CPT | Mod: S$PBB,,, | Performed by: STUDENT IN AN ORGANIZED HEALTH CARE EDUCATION/TRAINING PROGRAM

## 2025-06-25 NOTE — ASSESSMENT & PLAN NOTE
20 lb unintentional wgt loss  Stop digoxin and re-eval  If persists then will refer to GI for workup

## 2025-06-25 NOTE — PROGRESS NOTES
PCP: Laura Carnes FNP    Referring Provider:     Subjective:   Flaquita Sosa is a 79 y.o. female with hx of HFrEF, HTN, Afib s/p ablation followed by CRT-P, Dyslipidemia who presents for hospital discharge follow up.     6/25/25 - Patient report 20 lb wgt loss. Also report desgusia and decreased appetite. Amio has been held for 1 month without benefit. Will stop digoxin and see if that helps.     3/14/25 - patient underwent afib ablation complicated by sick sinus requiring dual chamber pacemaker. Received IV vanc and contrast and is now having pruritis. Received PO benadryl but still having symptoms.     1/2025 - Pt was recently hospitalized here at [facility] (1/16/25-1/18/25) for acute on chronic heart failure and Afib RVR. Echo showed EF of 15-20% with severe bi-atrial enlargement suggestive of restrictive cardiomyopathy. Labs for cardiac amyloid were obtained. Pt was diuresed with IV lasix and also required IV dopamine. She had previously been hospitalized at [facility] (discharged from there 1/15/25) where she underwent LHC that revealed normal coronaries. She also underwent cardioversion x 2, converted to NSR however then converted back to Afib. She was referred to Dr. Ulloa, electrophysiologist, in [place].    Today, pt has no cardiac complaints. She states she is feeling well and is ready to go back to work. She is weighing herself daily and has not required extra lasix. EKG today shows Afib with HR 101bpm.       Fhx:  Shx: Never smoker, no ETOH or drug use    EKG   Results for orders placed or performed in visit on 03/14/25   EKG 12-lead    Collection Time: 03/14/25 11:39 AM   Result Value Ref Range    QRS Duration 170 ms    [facility] QTC Calculation 473 ms    Narrative    Test Reason : I48.91,    Vent. Rate :  75 BPM     Atrial Rate :  75 BPM     P-R Int : 246 ms          QRS Dur : 170 ms      QT Int : 424 ms       P-R-T Axes :     35 237 degrees    QTcB Int : 473 ms    Atrial-paced rhythm  with prolonged AV conduction  Left bundle branch block  Abnormal ECG  When compared with ECG of 29-Jan-2025 08:55,  Electronic atrial pacemaker has replaced Atrial fibrillation  Inverted T waves have replaced nonspecific T wave abnormality in Lateral  leads  Confirmed by Yosi Robertson ([id number]) on 3/31/2025 8:39:17 AM    Referred By:            Confirmed By: Yosi Robertson     ECHO Results for orders placed during the hospital encounter of 01/16/25    Echo    Interpretation Summary    Left Ventricle: The left ventricle is moderately dilated. Mildly increased wall thickness. There is concentric hypertrophy. Septal motion is consistent with bundle branch block. There is severely reduced systolic function with a visually estimated ejection fraction of 15 - 20%. Quantitated ejection fraction is 17%. Unable to assess diastolic function due to atrial fibrillation.    Right Ventricle: Moderate right ventricular enlargement. Systolic function is moderately reduced.    Left Atrium: Left atrium is severely dilated.    Right Atrium: Right atrium is severely dilated.    Aortic Valve: The aortic valve is a trileaflet valve. Mildly calcified cusps. There is mild aortic regurgitation with a centrally directed jet.    Mitral Valve: There is mild bileaflet sclerosis. Mildly thickened leaflets. There is moderate to severe regurgitation with a centrally directed jet.    Tricuspid Valve: There is mild to moderate regurgitation with an eccentrically directed jet.    Pulmonic Valve: There is mild regurgitation.    Pulmonary Artery: The estimated pulmonary artery systolic pressure is 61 mmHg.    IVC/SVC: Elevated venous pressure at 15 mmHg.    Pericardium: There is a small effusion.    [facility] No results found for this or any previous visit.    ECHO 5/2025  1.The estimated LV ejection fraction is 40 %   2.LV systolic function is mildly reduced   3.There is no pericardial effusion present   There are no prior studies for comparison   Lab  "Results   Component Value Date     03/20/2025    K 4.2 03/20/2025     03/20/2025    CO2 29 03/20/2025    BUN 23 (H) 03/20/2025    CREATININE 1.50 (H) 03/20/2025    CALCIUM 8.5 03/20/2025    ANIONGAP 15 03/20/2025       No results found for: "CHOL"  No results found for: "HDL"  No results found for: "LDLCALC"  No results found for: "TRIG"  No results found for: "CHOLHDL"    Lab Results   Component Value Date    WBC 10.83 03/08/2025    HGB 12.0 03/08/2025    HCT 38.8 03/08/2025    MCV 98.0 (H) 03/08/2025     03/08/2025           Current Outpatient Medications:     cholecalciferol, vitamin D3, (VITAMIN D3) 50 mcg (2,000 unit) Cap capsule, Take 1 capsule by mouth once daily., Disp: , Rfl:     digoxin (LANOXIN) 125 mcg tablet, Take 1 tablet (0.125 mg total) by mouth once daily., Disp: 90 tablet, Rfl: 3    docusate sodium (COLACE) 100 MG capsule, Take 1 capsule (100 mg total) by mouth 2 (two) times daily., Disp: , Rfl:     ELIQUIS 2.5 mg Tab, Take 1 tablet (2.5 mg total) by mouth 2 (two) times daily., Disp: 180 tablet, Rfl: 3    ferrous sulfate 325 (65 FE) MG EC tablet, Take 325 mg by mouth once daily., Disp: , Rfl:     furosemide (LASIX) 40 MG tablet, Take 1 tablet (40 mg total) by mouth once daily., Disp: 90 tablet, Rfl: 3    JARDIANCE 10 mg tablet, Take 1 tablet (10 mg total) by mouth once daily., Disp: 90 tablet, Rfl: 3    metoprolol succinate (TOPROL-XL) 50 MG 24 hr tablet, Take 1 tablet (50 mg total) by mouth once daily., Disp: 90 tablet, Rfl: 3    multivit,iron,minerals/lutein (CENTRUM SILVER ULTRA WOMEN'S ORAL), Take by mouth., Disp: , Rfl:     pravastatin (PRAVACHOL) 40 MG tablet, Take 1 tablet (40 mg total) by mouth every evening., Disp: 90 tablet, Rfl: 3    sacubitriL-valsartan (ENTRESTO) 49-51 mg per tablet, Take 1 tablet by mouth 2 (two) times daily., Disp: 180 tablet, Rfl: 3    spironolactone (ALDACTONE) 25 MG tablet, Take 1 tablet (25 mg total) by mouth once daily., Disp: 90 tablet, Rfl: " "3    famotidine (PEPCID) 20 MG tablet, Take 1 tablet (20 mg total) by mouth 2 (two) times daily. for 14 days, Disp: 30 tablet, Rfl: 1    valACYclovir (VALTREX) 500 MG tablet, Take 500 mg by mouth. (Patient not taking: Reported on 6/25/2025), Disp: , Rfl:     Review of Systems   Constitutional:  Negative for chills, diaphoresis, fever and malaise/fatigue.   Respiratory:  Negative for cough and shortness of breath.    Cardiovascular:  Negative for chest pain, palpitations, orthopnea, leg swelling and PND.   Gastrointestinal:  Negative for abdominal pain, nausea and vomiting.   Musculoskeletal:  Negative for falls.   Neurological:  Negative for focal weakness and weakness.         Objective:   /80   Pulse 74   Resp 18   Ht 5' 8" (1.727 m)   Wt 68.5 kg (151 lb)   BMI 22.96 kg/m²     Physical Exam  Constitutional:       General: She is not in acute distress.     Appearance: Normal appearance.   Cardiovascular:      Rate and Rhythm: Tachycardia present. Rhythm irregularly irregular.      Heart sounds: Normal heart sounds.   Pulmonary:      Effort: Pulmonary effort is normal.      Breath sounds: Normal breath sounds. No wheezing or rales.   Musculoskeletal:      Cervical back: Neck supple. No rigidity.      Right lower leg: No edema.      Left lower leg: No edema.   Skin:     General: Skin is warm and dry.   Neurological:      Mental Status: She is alert.           Assessment:     1. Chronic systolic heart failure        2. Paroxysmal A-fib        3. Weight loss                Plan:   Chronic systolic heart failure  - EF 15-20%--> 40%, BiV failure, bi-atrial enlargement  - NYHA Class II Stage C  - Euvolemic on exam  - Continue lasix 40mg daily, and jardiance 10mg qd  - /70  - GDMT: On  Entresto to 49-51mg bid - Continue Toprol XL 50mg daily, Aldactone 25mg bid, Jardiance 10mg daily  - Device - CRT    Paroxysmal A-fib  - s/p ablation and ppm placement  by Dr. Ulloa  - remains on Eliquis      Weight " loss  20 lb unintentional wgt loss  Stop digoxin and re-eval  If persists then will refer to GI for workup

## 2025-06-25 NOTE — ASSESSMENT & PLAN NOTE
- EF 15-20%--> 40%, BiV failure, bi-atrial enlargement  - NYHA Class II Stage C  - Euvolemic on exam  - Continue lasix 40mg daily, and jardiance 10mg qd  - /70  - GDMT: On  Entresto to 49-51mg bid - Continue Toprol XL 50mg daily, Aldactone 25mg bid, Jardiance 10mg daily  - Device - CRT

## 2025-06-25 NOTE — ASSESSMENT & PLAN NOTE
- s/p ablation and ppm placement  by Dr. Ulloa  - remains on [facility]

## 2025-07-01 ENCOUNTER — HOSPITAL ENCOUNTER (OUTPATIENT)
Dept: RADIOLOGY | Facility: HOSPITAL | Age: 80
Discharge: HOME | End: 2025-07-01
Attending: RADIOLOGY
Payer: MEDICARE

## 2025-07-01 DIAGNOSIS — Z12.31 SCREENING MAMMOGRAM FOR BREAST CANCER: ICD-10-CM

## 2025-07-01 LAB
OHS CV AF BURDEN PERCENT: < 1
OHS CV BIV PACING PERCENT: 0 %
OHS CV DC REMOTE DEVICE TYPE: NORMAL
OHS CV ICD SHOCK: NO
OHS CV RV PACING PERCENT: 10 %

## 2025-07-01 PROCEDURE — 77063 BREAST TOMOSYNTHESIS BI: CPT | Mod: 26,,, | Performed by: RADIOLOGY

## 2025-07-01 PROCEDURE — 77067 SCR MAMMO BI INCL CAD: CPT | Mod: TC

## 2025-07-01 PROCEDURE — 77067 SCR MAMMO BI INCL CAD: CPT | Mod: 26,,, | Performed by: RADIOLOGY

## 2025-08-01 ENCOUNTER — CLINICAL SUPPORT (OUTPATIENT)
Dept: CARDIOLOGY | Facility: HOSPITAL | Age: 80
End: 2025-08-01
Payer: MEDICARE

## 2025-08-01 ENCOUNTER — HOSPITAL ENCOUNTER (OUTPATIENT)
Dept: CARDIOLOGY | Facility: HOSPITAL | Age: 80
Discharge: HOME | End: 2025-08-01
Attending: STUDENT IN AN ORGANIZED HEALTH CARE EDUCATION/TRAINING PROGRAM
Payer: MEDICARE

## 2025-08-01 DIAGNOSIS — R00.1 BRADYCARDIA, UNSPECIFIED: ICD-10-CM

## 2025-08-01 DIAGNOSIS — Z95.0 PRESENCE OF CARDIAC PACEMAKER: ICD-10-CM
